# Patient Record
Sex: FEMALE | Race: WHITE | Employment: OTHER | URBAN - METROPOLITAN AREA
[De-identification: names, ages, dates, MRNs, and addresses within clinical notes are randomized per-mention and may not be internally consistent; named-entity substitution may affect disease eponyms.]

---

## 2021-04-29 ENCOUNTER — HOSPITAL ENCOUNTER (OUTPATIENT)
Dept: LAB | Age: 71
Discharge: HOME | End: 2021-04-29
Payer: MEDICARE

## 2021-04-29 ENCOUNTER — TRANSCRIBE ORDER (OUTPATIENT)
Dept: REGISTRATION | Age: 71
End: 2021-04-29

## 2021-04-29 ENCOUNTER — HOSPITAL ENCOUNTER (OUTPATIENT)
Dept: NON INVASIVE DIAGNOSTICS | Age: 71
Discharge: HOME | End: 2021-04-29
Payer: MEDICARE

## 2021-04-29 DIAGNOSIS — M65.871 OTHER SYNOVITIS AND TENOSYNOVITIS, RIGHT ANKLE AND FOOT: ICD-10-CM

## 2021-04-29 DIAGNOSIS — M20.11 ACQUIRED HALLUX VALGUS OF RIGHT FOOT: Primary | ICD-10-CM

## 2021-04-29 DIAGNOSIS — Z01.812 BLOOD TESTS PRIOR TO TREATMENT OR PROCEDURE: ICD-10-CM

## 2021-04-29 DIAGNOSIS — M20.11 ACQUIRED HALLUX VALGUS OF RIGHT FOOT: ICD-10-CM

## 2021-04-29 DIAGNOSIS — M20.41 ACQUIRED HAMMER TOE OF RIGHT FOOT: ICD-10-CM

## 2021-04-29 LAB
ANION GAP SERPL CALC-SCNC: 4 MMOL/L (ref 3–18)
ATRIAL RATE: 71 BPM
BUN SERPL-MCNC: 18 MG/DL (ref 7–18)
BUN/CREAT SERPL: 17 (ref 12–20)
CALCIUM SERPL-MCNC: 8.9 MG/DL (ref 8.5–10.1)
CALCULATED P AXIS, ECG09: 83 DEGREES
CALCULATED R AXIS, ECG10: 47 DEGREES
CALCULATED T AXIS, ECG11: 78 DEGREES
CHLORIDE SERPL-SCNC: 97 MMOL/L (ref 100–111)
CO2 SERPL-SCNC: 33 MMOL/L (ref 21–32)
CREAT SERPL-MCNC: 1.08 MG/DL (ref 0.6–1.3)
DIAGNOSIS, 93000: NORMAL
GLUCOSE SERPL-MCNC: 86 MG/DL (ref 74–99)
HCT VFR BLD AUTO: 36.9 % (ref 35–45)
HGB BLD-MCNC: 12.3 G/DL (ref 12–16)
P-R INTERVAL, ECG05: 236 MS
POTASSIUM SERPL-SCNC: 4 MMOL/L (ref 3.5–5.5)
Q-T INTERVAL, ECG07: 420 MS
QRS DURATION, ECG06: 78 MS
QTC CALCULATION (BEZET), ECG08: 456 MS
SODIUM SERPL-SCNC: 134 MMOL/L (ref 136–145)
VENTRICULAR RATE, ECG03: 71 BPM

## 2021-04-29 PROCEDURE — 85018 HEMOGLOBIN: CPT

## 2021-04-29 PROCEDURE — 36415 COLL VENOUS BLD VENIPUNCTURE: CPT

## 2021-04-29 PROCEDURE — 80048 BASIC METABOLIC PNL TOTAL CA: CPT

## 2021-04-29 PROCEDURE — 93005 ELECTROCARDIOGRAM TRACING: CPT

## 2022-03-29 ENCOUNTER — TRANSCRIBE ORDER (OUTPATIENT)
Dept: SCHEDULING | Age: 72
End: 2022-03-29

## 2022-03-29 DIAGNOSIS — M25.552 LEFT HIP PAIN: Primary | ICD-10-CM

## 2022-03-29 DIAGNOSIS — M16.12 OSTEOARTHRITIS OF LEFT HIP: ICD-10-CM

## 2022-04-13 ENCOUNTER — HOSPITAL ENCOUNTER (OUTPATIENT)
Dept: MRI IMAGING | Age: 72
Discharge: HOME | End: 2022-04-13
Attending: ORTHOPAEDIC SURGERY
Payer: MEDICARE

## 2022-04-13 DIAGNOSIS — M16.12 OSTEOARTHRITIS OF LEFT HIP: ICD-10-CM

## 2022-04-13 DIAGNOSIS — M25.552 LEFT HIP PAIN: ICD-10-CM

## 2022-04-13 PROCEDURE — 72195 MRI PELVIS W/O DYE: CPT

## 2022-05-09 ENCOUNTER — TRANSCRIBE ORDER (OUTPATIENT)
Dept: REGISTRATION | Age: 72
End: 2022-05-09

## 2022-05-09 ENCOUNTER — HOSPITAL ENCOUNTER (OUTPATIENT)
Dept: PREADMISSION TESTING | Age: 72
Discharge: HOME | End: 2022-05-09
Payer: MEDICARE

## 2022-05-09 DIAGNOSIS — Z01.812 BLOOD TESTS PRIOR TO TREATMENT OR PROCEDURE: ICD-10-CM

## 2022-05-09 DIAGNOSIS — M16.12 PRIMARY OSTEOARTHRITIS OF LEFT HIP: Primary | ICD-10-CM

## 2022-05-09 DIAGNOSIS — M16.12 PRIMARY OSTEOARTHRITIS OF LEFT HIP: ICD-10-CM

## 2022-05-09 LAB
ALBUMIN SERPL-MCNC: 3.7 G/DL (ref 3.4–5)
ALBUMIN/GLOB SERPL: 1.1 {RATIO} (ref 0.8–1.7)
ALP SERPL-CCNC: 179 U/L (ref 45–117)
ALT SERPL-CCNC: 27 U/L (ref 13–56)
ANION GAP SERPL CALC-SCNC: 4 MMOL/L (ref 3–18)
APPEARANCE UR: CLEAR
APTT PPP: 26.2 SEC (ref 23–36.4)
AST SERPL-CCNC: 19 U/L (ref 10–38)
BACTERIA URNS QL MICRO: ABNORMAL /HPF
BASOPHILS # BLD: 0 K/UL (ref 0–0.1)
BASOPHILS NFR BLD: 0 % (ref 0–2)
BILIRUB SERPL-MCNC: 0.3 MG/DL (ref 0.2–1)
BILIRUB UR QL: NEGATIVE
BUN SERPL-MCNC: 23 MG/DL (ref 7–18)
BUN/CREAT SERPL: 19 (ref 12–20)
CALCIUM SERPL-MCNC: 9.2 MG/DL (ref 8.5–10.1)
CHLORIDE SERPL-SCNC: 100 MMOL/L (ref 100–111)
CO2 SERPL-SCNC: 32 MMOL/L (ref 21–32)
COLOR UR: YELLOW
CREAT SERPL-MCNC: 1.18 MG/DL (ref 0.6–1.3)
DIFFERENTIAL METHOD BLD: ABNORMAL
EOSINOPHIL # BLD: 0.5 K/UL (ref 0–0.4)
EOSINOPHIL NFR BLD: 7 % (ref 0–5)
EPITH CASTS URNS QL MICRO: ABNORMAL /LPF (ref 0–5)
ERYTHROCYTE [DISTWIDTH] IN BLOOD BY AUTOMATED COUNT: 13.2 % (ref 11.6–14.5)
GLOBULIN SER CALC-MCNC: 3.4 G/DL (ref 2–4)
GLUCOSE SERPL-MCNC: 106 MG/DL (ref 74–99)
GLUCOSE UR STRIP.AUTO-MCNC: NEGATIVE MG/DL
GRAN CASTS URNS QL MICRO: ABNORMAL /LPF
HBA1C MFR BLD: 5.8 % (ref 4.2–5.6)
HCT VFR BLD AUTO: 39 % (ref 35–45)
HGB BLD-MCNC: 12.5 G/DL (ref 12–16)
HGB UR QL STRIP: NEGATIVE
HYALINE CASTS URNS QL MICRO: ABNORMAL /LPF (ref 0–2)
IMM GRANULOCYTES # BLD AUTO: 0 K/UL (ref 0–0.04)
IMM GRANULOCYTES NFR BLD AUTO: 0 % (ref 0–0.5)
INR PPP: 0.9 (ref 0.8–1.2)
KETONES UR QL STRIP.AUTO: ABNORMAL MG/DL
LEUKOCYTE ESTERASE UR QL STRIP.AUTO: ABNORMAL
LYMPHOCYTES # BLD: 1.6 K/UL (ref 0.9–3.6)
LYMPHOCYTES NFR BLD: 23 % (ref 21–52)
MCH RBC QN AUTO: 31.1 PG (ref 24–34)
MCHC RBC AUTO-ENTMCNC: 32.1 G/DL (ref 31–37)
MCV RBC AUTO: 97 FL (ref 78–100)
MONOCYTES # BLD: 0.5 K/UL (ref 0.05–1.2)
MONOCYTES NFR BLD: 7 % (ref 3–10)
NEUTS SEG # BLD: 4.4 K/UL (ref 1.8–8)
NEUTS SEG NFR BLD: 63 % (ref 40–73)
NITRITE UR QL STRIP.AUTO: NEGATIVE
NRBC # BLD: 0 K/UL (ref 0–0.01)
NRBC BLD-RTO: 0 PER 100 WBC
PH UR STRIP: 6.5 [PH] (ref 5–8)
PLATELET # BLD AUTO: 272 K/UL (ref 135–420)
PMV BLD AUTO: 9.2 FL (ref 9.2–11.8)
POTASSIUM SERPL-SCNC: 4.1 MMOL/L (ref 3.5–5.5)
PROT SERPL-MCNC: 7.1 G/DL (ref 6.4–8.2)
PROT UR STRIP-MCNC: NEGATIVE MG/DL
PROTHROMBIN TIME: 12.1 SEC (ref 11.5–15.2)
RBC # BLD AUTO: 4.02 M/UL (ref 4.2–5.3)
RBC #/AREA URNS HPF: NEGATIVE /HPF (ref 0–5)
SODIUM SERPL-SCNC: 136 MMOL/L (ref 136–145)
SP GR UR REFRACTOMETRY: 1.01 (ref 1–1.03)
UROBILINOGEN UR QL STRIP.AUTO: 1 EU/DL (ref 0.2–1)
WBC # BLD AUTO: 7.1 K/UL (ref 4.6–13.2)
WBC URNS QL MICRO: ABNORMAL /HPF (ref 0–5)
YEAST URNS QL MICRO: ABNORMAL

## 2022-05-09 PROCEDURE — 80053 COMPREHEN METABOLIC PANEL: CPT

## 2022-05-09 PROCEDURE — 36415 COLL VENOUS BLD VENIPUNCTURE: CPT

## 2022-05-09 PROCEDURE — 85025 COMPLETE CBC W/AUTO DIFF WBC: CPT

## 2022-05-09 PROCEDURE — 87086 URINE CULTURE/COLONY COUNT: CPT

## 2022-05-09 PROCEDURE — 85730 THROMBOPLASTIN TIME PARTIAL: CPT

## 2022-05-09 PROCEDURE — 81001 URINALYSIS AUTO W/SCOPE: CPT

## 2022-05-09 PROCEDURE — 93005 ELECTROCARDIOGRAM TRACING: CPT

## 2022-05-09 PROCEDURE — 83036 HEMOGLOBIN GLYCOSYLATED A1C: CPT

## 2022-05-09 PROCEDURE — 85610 PROTHROMBIN TIME: CPT

## 2022-05-10 LAB
ATRIAL RATE: 82 BPM
BACTERIA SPEC CULT: NORMAL
CALCULATED P AXIS, ECG09: 58 DEGREES
CALCULATED R AXIS, ECG10: 0 DEGREES
CALCULATED T AXIS, ECG11: 40 DEGREES
CC UR VC: NORMAL
DIAGNOSIS, 93000: NORMAL
P-R INTERVAL, ECG05: 234 MS
Q-T INTERVAL, ECG07: 372 MS
QRS DURATION, ECG06: 72 MS
QTC CALCULATION (BEZET), ECG08: 434 MS
SERVICE CMNT-IMP: NORMAL
SERVICE CMNT-IMP: NORMAL
VENTRICULAR RATE, ECG03: 82 BPM

## 2022-05-16 ENCOUNTER — HOSPITAL ENCOUNTER (OUTPATIENT)
Dept: PREADMISSION TESTING | Age: 72
Discharge: HOME | End: 2022-05-16

## 2022-05-16 VITALS — BODY MASS INDEX: 28.06 KG/M2 | HEIGHT: 70 IN | WEIGHT: 196 LBS

## 2022-05-16 RX ORDER — DICLOFENAC SODIUM 75 MG/1
75 TABLET, DELAYED RELEASE ORAL 2 TIMES DAILY
COMMUNITY
End: 2022-05-24

## 2022-05-16 RX ORDER — CEFAZOLIN SODIUM/WATER 2 G/20 ML
2 SYRINGE (ML) INTRAVENOUS ONCE
Status: CANCELLED | OUTPATIENT
Start: 2022-05-16 | End: 2022-05-16

## 2022-05-16 RX ORDER — CLONAZEPAM 1 MG/1
1 TABLET ORAL 2 TIMES DAILY
COMMUNITY

## 2022-05-16 RX ORDER — TRIAMTERENE AND HYDROCHLOROTHIAZIDE 37.5; 25 MG/1; MG/1
1 CAPSULE ORAL DAILY
COMMUNITY

## 2022-05-16 RX ORDER — FLUVOXAMINE MALEATE 50 MG/1
50 TABLET ORAL
COMMUNITY

## 2022-05-16 RX ORDER — SODIUM CHLORIDE, SODIUM LACTATE, POTASSIUM CHLORIDE, CALCIUM CHLORIDE 600; 310; 30; 20 MG/100ML; MG/100ML; MG/100ML; MG/100ML
125 INJECTION, SOLUTION INTRAVENOUS CONTINUOUS
Status: CANCELLED | OUTPATIENT
Start: 2022-05-16

## 2022-05-16 NOTE — NURSE NAVIGATOR
Kathy Wray watched the pre op seminar online and received a preoperative education booklet in anticipation of surgery    Nurse Navigator

## 2022-05-16 NOTE — PERIOP NOTES
PAT - SURGICAL PRE-ADMISSION INSTRUCTIONS    NAME:  Lise Lau                                                          TODAY'S DATE:  5/16/2022    SURGERY DATE:  5/23/2022                                  SURGERY ARRIVAL TIME:   TBA    1. Do NOT eat or drink anything, including candy or gum, after MIDNIGHT on 5/23/2022 , unless you have specific instructions from your Surgeon or Anesthesia Provider to do so. 2. No smoking 24 hours before surgery. 3. No alcohol 24 hours prior to the day of surgery. 4. No recreational drugs for one week prior to the day of surgery. 5. Leave all valuables, including money/purse, at home. 6. Remove all jewelry, nail polish, makeup (including mascara); no lotions, powders, deodorant, or perfume/cologne/after shave. 7. Glasses/Contact lenses and Dentures may be worn to the hospital.  They will be removed prior to surgery. 8. Call your doctor if symptoms of a cold or illness develop within 24 ours prior to surgery. 9. AN ADULT MUST DRIVE YOU HOME AFTER OUTPATIENT SURGERY. 10. If you are having an OUTPATIENT procedure, please make arrangements for a responsible adult to be with you for 24 hours after your surgery. 11. If you are admitted to the hospital, you will be assigned to a bed after surgery is complete. Normally a family member will not be able to see you until you are in your assigned bed. 12. Visitation Restrictions Explained. Pt denies an advanced directive. Pt denies DNR. Pt states she has urinary retention with the last procedure and require urinary indwelling catheter for two days. She would like for the team to be aware. Special Instructions:  Covid Test not required, pt vaccinated. Pt instructed to call surgeon's office to clarify diclofenac (voltaran) med for further instructions. Patient Prep:    use CHG solution, pt received. Instructions review. These surgical instructions were reviewed with patient  during the PAT phone interview.

## 2022-05-19 NOTE — H&P
[address] [facility] [address]  History and Physical Exam    Patient: [facility] MRN: [id number]  SSN: [id number]    YOB: 1950  Age: 67 y.o. Sex: female      Subjective:      Chief Complaint: left hip pain    History of Present Illness:  Patient complains of left hip pain and difficulty ambulating. Past Medical History:   Diagnosis Date    Arthritis     osteo    Difficult intubation     esophagus needs to be stretch every 2 years, had done 03/2022    GERD (gastroesophageal reflux disease)     relux, and hiatal hernia    Hypertension     Osteoarthritis of right knee 2/1/2013    Other ill-defined conditions(799.89)     hiatal hernia    Psychiatric disorder     depression    S/P total knee arthroplasty 02/04/2013     Past Surgical History:   Procedure Laterality Date    HX CATARACT REMOVAL      left eye    HX CHOLECYSTECTOMY      laparoscopic    HX COLONOSCOPY      had 2 done, f/u in 10 years    HX HEENT  04/2022    repair detached retena    HX HYSTERECTOMY      TVH    HX KNEE ARTHROSCOPY      right knee replacement    HX OOPHORECTOMY      BSO    HX ORTHOPAEDIC      manipulation of left shoulder under anesthesia     Social History     Occupational History    Not on file   Tobacco Use    Smoking status: Never Smoker    Smokeless tobacco: Never Used   Substance and Sexual Activity    Alcohol use: No    Drug use: Never    Sexual activity: Not on file     Prior to Admission medications    Medication Sig Start Date End Date Taking? Authorizing Provider   flavoxate HCl (FLAVOXATE PO) Take  by mouth. Provider, Historical   fluvoxaMINE (LUVOX) 50 mg tablet Take 50 mg by mouth nightly. Indications: depression    Provider, Historical   linaCLOtide (Linzess) 145 mcg cap capsule Take 145 mg by mouth. Indications: IBS    Provider, Historical   clonazePAM (KlonoPIN) 1 mg tablet Take 1 mg by mouth two (2) times a day.     Provider, Historical triamterene-hydroCHLOROthiazide (DYAZIDE) 37.5-25 mg per capsule Take 1 Capsule by mouth daily. Provider, Historical   diclofenac EC (VOLTAREN) 75 mg EC tablet Take 75 mg by mouth two (2) times a day. Indications: arthritis    Provider, Historical   oxyCODONE-acetaminophen (PERCOCET) 5-325 mg per tablet Take 1-2 Tabs by mouth every four (4) hours as needed for Pain. 2/6/13   Arleen JOSEPH PA-C   oxyCODONE IR (ROXICODONE) 10 mg Tab immediate release tablet Take  by mouth four (4) times daily as needed. Provider, Historical   Dexlansoprazole (DEXILANT) 60 mg CpDM Take 1 Cap by mouth daily. Pt instructed to take with a small bit of water on DOS    Provider, Historical   escitalopram (LEXAPRO) 20 mg tablet Take 20 mg by mouth daily. Provider, Historical   ARIPiprazole (ABILIFY) 2 mg tablet Take 10 mg by mouth nightly. Indications: depression    Provider, Historical   amitriptyline (ELAVIL) 100 mg tablet Take 100 mg by mouth two (2) times a day. Indications: helps with sleep    Provider, Historical       Allergies: Allergies   Allergen Reactions    Neosporin [Benzalkonium Chloride] Other (comments)     Soreness and redness    Other Medication Shortness of Breath and Swelling     Yellow Jacket bee stings      No pertinent family history. Review of Systems:  A comprehensive review of systems was negative except for that written in the History of Present Illness. Objective:       Physical Exam:  HEENT: Normocephalic, atraumatic  Lungs:  Clear to auscultation  Heart:   Regular rate and rhythm  Abdomen: Soft  Extremities:  Pain with range of motion of the left hip  Neurological: Grossly neurovascularly intact    Assessment:      Arthritis of the left hip. Plan:       The patient has failed previous efforts of conservative management to include non-steroidal anti-inflammatory medications and cortisone injections.  Due to the fact that conservative efforts failed, the patient became a candidate for surgical intervention. Proceed with scheduled left total hip arthroplasty, anterior approach. The various methods of treatment have been discussed with the patient and family. After consideration of risks, benefits, and other options for treatment, the patient has consented to surgical interventions. Questions were answered and preoperative teaching was done by Dr. Compa Davila.      Signed By: Renny Willis PA-C     May 19, 2022

## 2022-05-20 ENCOUNTER — ANESTHESIA EVENT (OUTPATIENT)
Dept: SURGERY | Age: 72
DRG: 468 | End: 2022-05-20
Payer: MEDICARE

## 2022-05-23 ENCOUNTER — HOSPITAL ENCOUNTER (INPATIENT)
Age: 72
LOS: 1 days | Discharge: HOME HEALTH | DRG: 468 | End: 2022-05-24
Attending: ORTHOPAEDIC SURGERY | Admitting: ORTHOPAEDIC SURGERY
Payer: MEDICARE

## 2022-05-23 ENCOUNTER — ANESTHESIA (OUTPATIENT)
Dept: SURGERY | Age: 72
DRG: 468 | End: 2022-05-23
Payer: MEDICARE

## 2022-05-23 ENCOUNTER — APPOINTMENT (OUTPATIENT)
Dept: GENERAL RADIOLOGY | Age: 72
DRG: 468 | End: 2022-05-23
Attending: PHYSICIAN ASSISTANT
Payer: MEDICARE

## 2022-05-23 ENCOUNTER — APPOINTMENT (OUTPATIENT)
Dept: GENERAL RADIOLOGY | Age: 72
DRG: 468 | End: 2022-05-23
Attending: ORTHOPAEDIC SURGERY
Payer: MEDICARE

## 2022-05-23 DIAGNOSIS — Z96.642 S/P HIP REPLACEMENT, LEFT: Primary | ICD-10-CM

## 2022-05-23 LAB
ABO + RH BLD: NORMAL
BLOOD GROUP ANTIBODIES SERPL: NORMAL
GLUCOSE BLD STRIP.AUTO-MCNC: 109 MG/DL (ref 70–110)
SPECIMEN EXP DATE BLD: NORMAL

## 2022-05-23 PROCEDURE — 77030013708 HC HNDPC SUC IRR PULS STRY –B: Performed by: ORTHOPAEDIC SURGERY

## 2022-05-23 PROCEDURE — 74011250636 HC RX REV CODE- 250/636: Performed by: ORTHOPAEDIC SURGERY

## 2022-05-23 PROCEDURE — 77030018673: Performed by: ORTHOPAEDIC SURGERY

## 2022-05-23 PROCEDURE — 36415 COLL VENOUS BLD VENIPUNCTURE: CPT

## 2022-05-23 PROCEDURE — C1776 JOINT DEVICE (IMPLANTABLE): HCPCS | Performed by: ORTHOPAEDIC SURGERY

## 2022-05-23 PROCEDURE — 76210000023 HC REC RM PH II 2 TO 2.5 HR: Performed by: ORTHOPAEDIC SURGERY

## 2022-05-23 PROCEDURE — 74011250636 HC RX REV CODE- 250/636: Performed by: NURSE ANESTHETIST, CERTIFIED REGISTERED

## 2022-05-23 PROCEDURE — 80048 BASIC METABOLIC PNL TOTAL CA: CPT

## 2022-05-23 PROCEDURE — 97166 OT EVAL MOD COMPLEX 45 MIN: CPT

## 2022-05-23 PROCEDURE — 77030012712 HC WND ARTHRO ABLT S&N -E: Performed by: ORTHOPAEDIC SURGERY

## 2022-05-23 PROCEDURE — 77030006643: Performed by: STUDENT IN AN ORGANIZED HEALTH CARE EDUCATION/TRAINING PROGRAM

## 2022-05-23 PROCEDURE — 74011000250 HC RX REV CODE- 250: Performed by: PHYSICIAN ASSISTANT

## 2022-05-23 PROCEDURE — 77030034479 HC ADH SKN CLSR PRINEO J&J -B: Performed by: ORTHOPAEDIC SURGERY

## 2022-05-23 PROCEDURE — 77030031139 HC SUT VCRL2 J&J -A: Performed by: ORTHOPAEDIC SURGERY

## 2022-05-23 PROCEDURE — 85014 HEMATOCRIT: CPT

## 2022-05-23 PROCEDURE — 77030035643 HC BLD SAW OSC PRECIS STRY -C: Performed by: ORTHOPAEDIC SURGERY

## 2022-05-23 PROCEDURE — 77030008477 HC STYL SATN SLP COVD -A: Performed by: STUDENT IN AN ORGANIZED HEALTH CARE EDUCATION/TRAINING PROGRAM

## 2022-05-23 PROCEDURE — 76060000036 HC ANESTHESIA 2.5 TO 3 HR: Performed by: ORTHOPAEDIC SURGERY

## 2022-05-23 PROCEDURE — 65270000029 HC RM PRIVATE

## 2022-05-23 PROCEDURE — 77030002933 HC SUT MCRYL J&J -A: Performed by: ORTHOPAEDIC SURGERY

## 2022-05-23 PROCEDURE — 51798 US URINE CAPACITY MEASURE: CPT

## 2022-05-23 PROCEDURE — 76010000132 HC OR TIME 2.5 TO 3 HR: Performed by: ORTHOPAEDIC SURGERY

## 2022-05-23 PROCEDURE — G0378 HOSPITAL OBSERVATION PER HR: HCPCS

## 2022-05-23 PROCEDURE — 97530 THERAPEUTIC ACTIVITIES: CPT

## 2022-05-23 PROCEDURE — 77030018846 HC SOL IRR STRL H20 ICUM -A: Performed by: ORTHOPAEDIC SURGERY

## 2022-05-23 PROCEDURE — 77030018835 HC SOL IRR LR ICUM -A: Performed by: ORTHOPAEDIC SURGERY

## 2022-05-23 PROCEDURE — 77030008683 HC TU ET CUF COVD -A: Performed by: STUDENT IN AN ORGANIZED HEALTH CARE EDUCATION/TRAINING PROGRAM

## 2022-05-23 PROCEDURE — 77030040361 HC SLV COMPR DVT MDII -B: Performed by: ORTHOPAEDIC SURGERY

## 2022-05-23 PROCEDURE — 82962 GLUCOSE BLOOD TEST: CPT

## 2022-05-23 PROCEDURE — 76210000006 HC OR PH I REC 0.5 TO 1 HR: Performed by: ORTHOPAEDIC SURGERY

## 2022-05-23 PROCEDURE — 74011250636 HC RX REV CODE- 250/636: Performed by: STUDENT IN AN ORGANIZED HEALTH CARE EDUCATION/TRAINING PROGRAM

## 2022-05-23 PROCEDURE — 74011000250 HC RX REV CODE- 250: Performed by: NURSE ANESTHETIST, CERTIFIED REGISTERED

## 2022-05-23 PROCEDURE — 74011250636 HC RX REV CODE- 250/636: Performed by: PHYSICIAN ASSISTANT

## 2022-05-23 PROCEDURE — 2709999900 HC NON-CHARGEABLE SUPPLY: Performed by: ORTHOPAEDIC SURGERY

## 2022-05-23 PROCEDURE — 97162 PT EVAL MOD COMPLEX 30 MIN: CPT

## 2022-05-23 PROCEDURE — 77030027138 HC INCENT SPIROMETER -A: Performed by: ORTHOPAEDIC SURGERY

## 2022-05-23 PROCEDURE — 73501 X-RAY EXAM HIP UNI 1 VIEW: CPT

## 2022-05-23 PROCEDURE — 77030020782 HC GWN BAIR PAWS FLX 3M -B: Performed by: ORTHOPAEDIC SURGERY

## 2022-05-23 PROCEDURE — 74011000250 HC RX REV CODE- 250: Performed by: ORTHOPAEDIC SURGERY

## 2022-05-23 PROCEDURE — 77030003666 HC NDL SPINAL BD -A: Performed by: ORTHOPAEDIC SURGERY

## 2022-05-23 PROCEDURE — 74011250637 HC RX REV CODE- 250/637: Performed by: PHYSICIAN ASSISTANT

## 2022-05-23 PROCEDURE — 77030026044 HC TIP IRR PULS STRY -A: Performed by: ORTHOPAEDIC SURGERY

## 2022-05-23 PROCEDURE — 86900 BLOOD TYPING SEROLOGIC ABO: CPT

## 2022-05-23 DEVICE — LFIT V40 FEMORAL HEAD
Type: IMPLANTABLE DEVICE | Site: HIP | Status: FUNCTIONAL
Brand: V40 HEAD

## 2022-05-23 DEVICE — HIP STEM - HIGH OFFSET
Type: IMPLANTABLE DEVICE | Site: HIP | Status: FUNCTIONAL
Brand: INSIGNIA

## 2022-05-23 DEVICE — X3 INSERT FOR ADM/ MDM
Type: IMPLANTABLE DEVICE | Site: HIP | Status: FUNCTIONAL
Brand: X3

## 2022-05-23 DEVICE — TRIDENT II CLUSTERHOLE HA ACETABULAR SHELL 54E
Type: IMPLANTABLE DEVICE | Site: HIP | Status: FUNCTIONAL
Brand: TRIDENT II

## 2022-05-23 DEVICE — IMPLANTABLE DEVICE: Type: IMPLANTABLE DEVICE | Site: HIP | Status: FUNCTIONAL

## 2022-05-23 DEVICE — SCR HEX 6.5X25MM -- TRIDENT II: Type: IMPLANTABLE DEVICE | Site: HIP | Status: FUNCTIONAL

## 2022-05-23 DEVICE — LINER- CEMENTLESS
Type: IMPLANTABLE DEVICE | Site: HIP | Status: FUNCTIONAL
Brand: MDM

## 2022-05-23 RX ORDER — MIDAZOLAM HYDROCHLORIDE 1 MG/ML
INJECTION, SOLUTION INTRAMUSCULAR; INTRAVENOUS AS NEEDED
Status: DISCONTINUED | OUTPATIENT
Start: 2022-05-23 | End: 2022-05-23 | Stop reason: HOSPADM

## 2022-05-23 RX ORDER — PROPOFOL 10 MG/ML
INJECTION, EMULSION INTRAVENOUS AS NEEDED
Status: DISCONTINUED | OUTPATIENT
Start: 2022-05-23 | End: 2022-05-23 | Stop reason: HOSPADM

## 2022-05-23 RX ORDER — DIPHENHYDRAMINE HYDROCHLORIDE 50 MG/ML
12.5 INJECTION, SOLUTION INTRAMUSCULAR; INTRAVENOUS
Status: DISCONTINUED | OUTPATIENT
Start: 2022-05-23 | End: 2022-05-24 | Stop reason: HOSPADM

## 2022-05-23 RX ORDER — TRIAMTERENE/HYDROCHLOROTHIAZID 37.5-25 MG
1 TABLET ORAL DAILY
Status: DISCONTINUED | OUTPATIENT
Start: 2022-05-24 | End: 2022-05-24 | Stop reason: HOSPADM

## 2022-05-23 RX ORDER — ONDANSETRON 2 MG/ML
INJECTION INTRAMUSCULAR; INTRAVENOUS AS NEEDED
Status: DISCONTINUED | OUTPATIENT
Start: 2022-05-23 | End: 2022-05-23 | Stop reason: HOSPADM

## 2022-05-23 RX ORDER — CEPHALEXIN 500 MG/1
500 CAPSULE ORAL 4 TIMES DAILY
Qty: 8 CAPSULE | Refills: 0 | Status: SHIPPED | OUTPATIENT
Start: 2022-05-23 | End: 2022-05-25

## 2022-05-23 RX ORDER — ALBUTEROL SULFATE 0.83 MG/ML
2.5 SOLUTION RESPIRATORY (INHALATION)
Status: DISCONTINUED | OUTPATIENT
Start: 2022-05-23 | End: 2022-05-24 | Stop reason: HOSPADM

## 2022-05-23 RX ORDER — DIPHENHYDRAMINE HCL 25 MG
25 CAPSULE ORAL
Status: DISCONTINUED | OUTPATIENT
Start: 2022-05-23 | End: 2022-05-24 | Stop reason: HOSPADM

## 2022-05-23 RX ORDER — FLUVOXAMINE MALEATE 50 MG/1
50 TABLET ORAL
Status: DISCONTINUED | OUTPATIENT
Start: 2022-05-23 | End: 2022-05-24 | Stop reason: HOSPADM

## 2022-05-23 RX ORDER — SODIUM CHLORIDE 0.9 % (FLUSH) 0.9 %
5-40 SYRINGE (ML) INJECTION EVERY 8 HOURS
Status: DISCONTINUED | OUTPATIENT
Start: 2022-05-23 | End: 2022-05-24 | Stop reason: HOSPADM

## 2022-05-23 RX ORDER — EPHEDRINE SULFATE/0.9% NACL/PF 50 MG/5 ML
SYRINGE (ML) INTRAVENOUS AS NEEDED
Status: DISCONTINUED | OUTPATIENT
Start: 2022-05-23 | End: 2022-05-23 | Stop reason: HOSPADM

## 2022-05-23 RX ORDER — PANTOPRAZOLE SODIUM 40 MG/1
40 TABLET, DELAYED RELEASE ORAL
Status: DISCONTINUED | OUTPATIENT
Start: 2022-05-24 | End: 2022-05-24 | Stop reason: HOSPADM

## 2022-05-23 RX ORDER — CEFAZOLIN SODIUM/WATER 2 G/20 ML
2 SYRINGE (ML) INTRAVENOUS EVERY 8 HOURS
Status: COMPLETED | OUTPATIENT
Start: 2022-05-23 | End: 2022-05-24

## 2022-05-23 RX ORDER — LIDOCAINE HYDROCHLORIDE 20 MG/ML
INJECTION, SOLUTION EPIDURAL; INFILTRATION; INTRACAUDAL; PERINEURAL AS NEEDED
Status: DISCONTINUED | OUTPATIENT
Start: 2022-05-23 | End: 2022-05-23 | Stop reason: HOSPADM

## 2022-05-23 RX ORDER — NALOXONE HYDROCHLORIDE 0.4 MG/ML
0.04 INJECTION, SOLUTION INTRAMUSCULAR; INTRAVENOUS; SUBCUTANEOUS
Status: DISCONTINUED | OUTPATIENT
Start: 2022-05-23 | End: 2022-05-24 | Stop reason: HOSPADM

## 2022-05-23 RX ORDER — DEXAMETHASONE SODIUM PHOSPHATE 4 MG/ML
INJECTION, SOLUTION INTRA-ARTICULAR; INTRALESIONAL; INTRAMUSCULAR; INTRAVENOUS; SOFT TISSUE AS NEEDED
Status: DISCONTINUED | OUTPATIENT
Start: 2022-05-23 | End: 2022-05-23 | Stop reason: HOSPADM

## 2022-05-23 RX ORDER — SODIUM CHLORIDE, SODIUM LACTATE, POTASSIUM CHLORIDE, CALCIUM CHLORIDE 600; 310; 30; 20 MG/100ML; MG/100ML; MG/100ML; MG/100ML
125 INJECTION, SOLUTION INTRAVENOUS CONTINUOUS
Status: DISCONTINUED | OUTPATIENT
Start: 2022-05-23 | End: 2022-05-24 | Stop reason: HOSPADM

## 2022-05-23 RX ORDER — HYDROMORPHONE HYDROCHLORIDE 1 MG/ML
0.5 INJECTION, SOLUTION INTRAMUSCULAR; INTRAVENOUS; SUBCUTANEOUS AS NEEDED
Status: DISCONTINUED | OUTPATIENT
Start: 2022-05-23 | End: 2022-05-24 | Stop reason: HOSPADM

## 2022-05-23 RX ORDER — ESCITALOPRAM OXALATE 10 MG/1
20 TABLET ORAL DAILY
Status: DISCONTINUED | OUTPATIENT
Start: 2022-05-24 | End: 2022-05-24 | Stop reason: HOSPADM

## 2022-05-23 RX ORDER — ONDANSETRON 2 MG/ML
4 INJECTION INTRAMUSCULAR; INTRAVENOUS ONCE
Status: ACTIVE | OUTPATIENT
Start: 2022-05-23 | End: 2022-05-24

## 2022-05-23 RX ORDER — PHENYLEPHRINE HCL IN 0.9% NACL 1 MG/10 ML
SYRINGE (ML) INTRAVENOUS AS NEEDED
Status: DISCONTINUED | OUTPATIENT
Start: 2022-05-23 | End: 2022-05-23 | Stop reason: HOSPADM

## 2022-05-23 RX ORDER — CEFAZOLIN SODIUM/WATER 2 G/20 ML
2 SYRINGE (ML) INTRAVENOUS ONCE
Status: COMPLETED | OUTPATIENT
Start: 2022-05-23 | End: 2022-05-23

## 2022-05-23 RX ORDER — ASPIRIN 81 MG/1
81 TABLET ORAL 2 TIMES DAILY
Qty: 60 TABLET | Refills: 0 | Status: SHIPPED | OUTPATIENT
Start: 2022-05-23

## 2022-05-23 RX ORDER — OXYCODONE AND ACETAMINOPHEN 5; 325 MG/1; MG/1
1-2 TABLET ORAL
Status: DISCONTINUED | OUTPATIENT
Start: 2022-05-23 | End: 2022-05-24 | Stop reason: HOSPADM

## 2022-05-23 RX ORDER — SODIUM CHLORIDE, SODIUM LACTATE, POTASSIUM CHLORIDE, CALCIUM CHLORIDE 600; 310; 30; 20 MG/100ML; MG/100ML; MG/100ML; MG/100ML
50 INJECTION, SOLUTION INTRAVENOUS CONTINUOUS
Status: DISCONTINUED | OUTPATIENT
Start: 2022-05-23 | End: 2022-05-24 | Stop reason: HOSPADM

## 2022-05-23 RX ORDER — FENTANYL CITRATE 50 UG/ML
50 INJECTION, SOLUTION INTRAMUSCULAR; INTRAVENOUS
Status: DISCONTINUED | OUTPATIENT
Start: 2022-05-23 | End: 2022-05-24 | Stop reason: HOSPADM

## 2022-05-23 RX ORDER — HYDROCODONE BITARTRATE AND ACETAMINOPHEN 5; 325 MG/1; MG/1
1 TABLET ORAL
Status: COMPLETED | OUTPATIENT
Start: 2022-05-23 | End: 2022-05-24

## 2022-05-23 RX ORDER — ASPIRIN 81 MG/1
81 TABLET ORAL 2 TIMES DAILY
Status: DISCONTINUED | OUTPATIENT
Start: 2022-05-23 | End: 2022-05-24 | Stop reason: HOSPADM

## 2022-05-23 RX ORDER — ARIPIPRAZOLE 10 MG/1
10 TABLET ORAL
Status: DISCONTINUED | OUTPATIENT
Start: 2022-05-23 | End: 2022-05-24 | Stop reason: HOSPADM

## 2022-05-23 RX ORDER — HYDROMORPHONE HYDROCHLORIDE 1 MG/ML
0.5 INJECTION, SOLUTION INTRAMUSCULAR; INTRAVENOUS; SUBCUTANEOUS
Status: DISCONTINUED | OUTPATIENT
Start: 2022-05-23 | End: 2022-05-24 | Stop reason: HOSPADM

## 2022-05-23 RX ORDER — ONDANSETRON 2 MG/ML
4 INJECTION INTRAMUSCULAR; INTRAVENOUS
Status: DISCONTINUED | OUTPATIENT
Start: 2022-05-23 | End: 2022-05-24 | Stop reason: HOSPADM

## 2022-05-23 RX ORDER — NALBUPHINE HYDROCHLORIDE 10 MG/ML
5 INJECTION, SOLUTION INTRAMUSCULAR; INTRAVENOUS; SUBCUTANEOUS
Status: DISCONTINUED | OUTPATIENT
Start: 2022-05-23 | End: 2022-05-24 | Stop reason: HOSPADM

## 2022-05-23 RX ORDER — SODIUM CHLORIDE 0.9 % (FLUSH) 0.9 %
5-40 SYRINGE (ML) INJECTION AS NEEDED
Status: DISCONTINUED | OUTPATIENT
Start: 2022-05-23 | End: 2022-05-24 | Stop reason: HOSPADM

## 2022-05-23 RX ORDER — NALOXONE HYDROCHLORIDE 0.4 MG/ML
0.4 INJECTION, SOLUTION INTRAMUSCULAR; INTRAVENOUS; SUBCUTANEOUS AS NEEDED
Status: DISCONTINUED | OUTPATIENT
Start: 2022-05-23 | End: 2022-05-24 | Stop reason: HOSPADM

## 2022-05-23 RX ORDER — ROCURONIUM BROMIDE 10 MG/ML
INJECTION, SOLUTION INTRAVENOUS AS NEEDED
Status: DISCONTINUED | OUTPATIENT
Start: 2022-05-23 | End: 2022-05-23 | Stop reason: HOSPADM

## 2022-05-23 RX ORDER — SODIUM CHLORIDE, SODIUM LACTATE, POTASSIUM CHLORIDE, CALCIUM CHLORIDE 600; 310; 30; 20 MG/100ML; MG/100ML; MG/100ML; MG/100ML
75 INJECTION, SOLUTION INTRAVENOUS CONTINUOUS
Status: DISCONTINUED | OUTPATIENT
Start: 2022-05-23 | End: 2022-05-24 | Stop reason: HOSPADM

## 2022-05-23 RX ORDER — CLONAZEPAM 0.5 MG/1
1 TABLET ORAL 2 TIMES DAILY
Status: DISCONTINUED | OUTPATIENT
Start: 2022-05-23 | End: 2022-05-24 | Stop reason: HOSPADM

## 2022-05-23 RX ORDER — FLAVOXATE HYDROCHLORIDE 100 MG/1
100 TABLET ORAL DAILY
Status: DISCONTINUED | OUTPATIENT
Start: 2022-05-24 | End: 2022-05-24 | Stop reason: HOSPADM

## 2022-05-23 RX ORDER — GLYCOPYRROLATE 0.2 MG/ML
INJECTION INTRAMUSCULAR; INTRAVENOUS AS NEEDED
Status: DISCONTINUED | OUTPATIENT
Start: 2022-05-23 | End: 2022-05-23 | Stop reason: HOSPADM

## 2022-05-23 RX ORDER — AMITRIPTYLINE HYDROCHLORIDE 50 MG/1
100 TABLET, FILM COATED ORAL 2 TIMES DAILY
Status: DISCONTINUED | OUTPATIENT
Start: 2022-05-23 | End: 2022-05-24 | Stop reason: HOSPADM

## 2022-05-23 RX ORDER — KETAMINE HCL IN 0.9 % NACL 50 MG/5 ML
SYRINGE (ML) INTRAVENOUS AS NEEDED
Status: DISCONTINUED | OUTPATIENT
Start: 2022-05-23 | End: 2022-05-23 | Stop reason: HOSPADM

## 2022-05-23 RX ORDER — OXYCODONE AND ACETAMINOPHEN 5; 325 MG/1; MG/1
1-2 TABLET ORAL
Qty: 40 TABLET | Refills: 0 | Status: SHIPPED | OUTPATIENT
Start: 2022-05-23 | End: 2022-05-26

## 2022-05-23 RX ORDER — TRANEXAMIC ACID 10 MG/ML
1 INJECTION, SOLUTION INTRAVENOUS SEE ADMIN INSTRUCTIONS
Status: COMPLETED | OUTPATIENT
Start: 2022-05-23 | End: 2022-05-23

## 2022-05-23 RX ORDER — FENTANYL CITRATE 50 UG/ML
INJECTION, SOLUTION INTRAMUSCULAR; INTRAVENOUS AS NEEDED
Status: DISCONTINUED | OUTPATIENT
Start: 2022-05-23 | End: 2022-05-23 | Stop reason: HOSPADM

## 2022-05-23 RX ORDER — NALOXONE HYDROCHLORIDE 4 MG/.1ML
SPRAY NASAL
Qty: 1 EACH | Refills: 0 | Status: SHIPPED | OUTPATIENT
Start: 2022-05-23 | End: 2022-07-27

## 2022-05-23 RX ADMIN — Medication 2 G: at 14:34

## 2022-05-23 RX ADMIN — PROPOFOL 180 MG: 10 INJECTION, EMULSION INTRAVENOUS at 14:19

## 2022-05-23 RX ADMIN — Medication 50 MCG: at 15:14

## 2022-05-23 RX ADMIN — CEFAZOLIN 2 G: 10 INJECTION, POWDER, FOR SOLUTION INTRAVENOUS at 22:28

## 2022-05-23 RX ADMIN — GLYCOPYRROLATE 0.1 MG: 0.2 INJECTION INTRAMUSCULAR; INTRAVENOUS at 15:13

## 2022-05-23 RX ADMIN — LIDOCAINE HYDROCHLORIDE 60 MG: 20 INJECTION, SOLUTION INTRAVENOUS at 14:19

## 2022-05-23 RX ADMIN — CLONAZEPAM 1 MG: 0.5 TABLET ORAL at 22:27

## 2022-05-23 RX ADMIN — GLYCOPYRROLATE 0.1 MG: 0.2 INJECTION INTRAMUSCULAR; INTRAVENOUS at 15:11

## 2022-05-23 RX ADMIN — AMITRIPTYLINE HYDROCHLORIDE 100 MG: 50 TABLET, FILM COATED ORAL at 22:27

## 2022-05-23 RX ADMIN — FENTANYL CITRATE 100 MCG: 50 INJECTION, SOLUTION INTRAMUSCULAR; INTRAVENOUS at 14:19

## 2022-05-23 RX ADMIN — SODIUM CHLORIDE, SODIUM LACTATE, POTASSIUM CHLORIDE, AND CALCIUM CHLORIDE 125 ML/HR: 600; 310; 30; 20 INJECTION, SOLUTION INTRAVENOUS at 11:56

## 2022-05-23 RX ADMIN — SUGAMMADEX 200 MG: 100 INJECTION, SOLUTION INTRAVENOUS at 16:53

## 2022-05-23 RX ADMIN — ONDANSETRON HYDROCHLORIDE 4 MG: 2 INJECTION INTRAMUSCULAR; INTRAVENOUS at 15:24

## 2022-05-23 RX ADMIN — HYDROMORPHONE HYDROCHLORIDE 0.5 MG: 1 INJECTION, SOLUTION INTRAMUSCULAR; INTRAVENOUS; SUBCUTANEOUS at 18:09

## 2022-05-23 RX ADMIN — Medication 10 MG: at 15:04

## 2022-05-23 RX ADMIN — ASPIRIN 81 MG: 81 TABLET, COATED ORAL at 22:28

## 2022-05-23 RX ADMIN — Medication 20 MG: at 14:45

## 2022-05-23 RX ADMIN — TRANEXAMIC ACID 1 G: 10 INJECTION, SOLUTION INTRAVENOUS at 14:26

## 2022-05-23 RX ADMIN — ROCURONIUM BROMIDE 10 MG: 10 INJECTION, SOLUTION INTRAVENOUS at 15:18

## 2022-05-23 RX ADMIN — MIDAZOLAM 2 MG: 1 INJECTION INTRAMUSCULAR; INTRAVENOUS at 14:13

## 2022-05-23 RX ADMIN — TRANEXAMIC ACID 1 G: 10 INJECTION, SOLUTION INTRAVENOUS at 16:36

## 2022-05-23 RX ADMIN — SODIUM CHLORIDE, SODIUM LACTATE, POTASSIUM CHLORIDE, AND CALCIUM CHLORIDE 125 ML/HR: 600; 310; 30; 20 INJECTION, SOLUTION INTRAVENOUS at 11:09

## 2022-05-23 RX ADMIN — SODIUM CHLORIDE, SODIUM LACTATE, POTASSIUM CHLORIDE, AND CALCIUM CHLORIDE: 600; 310; 30; 20 INJECTION, SOLUTION INTRAVENOUS at 16:48

## 2022-05-23 RX ADMIN — ROCURONIUM BROMIDE 20 MG: 10 INJECTION, SOLUTION INTRAVENOUS at 15:54

## 2022-05-23 RX ADMIN — ROCURONIUM BROMIDE 20 MG: 10 INJECTION, SOLUTION INTRAVENOUS at 16:16

## 2022-05-23 RX ADMIN — Medication 12.5 MG: at 15:08

## 2022-05-23 RX ADMIN — ROCURONIUM BROMIDE 40 MG: 10 INJECTION, SOLUTION INTRAVENOUS at 14:19

## 2022-05-23 RX ADMIN — SODIUM CHLORIDE, SODIUM LACTATE, POTASSIUM CHLORIDE, AND CALCIUM CHLORIDE: 600; 310; 30; 20 INJECTION, SOLUTION INTRAVENOUS at 15:02

## 2022-05-23 RX ADMIN — DEXAMETHASONE SODIUM PHOSPHATE 4 MG: 4 INJECTION, SOLUTION INTRAMUSCULAR; INTRAVENOUS at 15:24

## 2022-05-23 RX ADMIN — SODIUM CHLORIDE, POTASSIUM CHLORIDE, SODIUM LACTATE AND CALCIUM CHLORIDE 75 ML/HR: 600; 310; 30; 20 INJECTION, SOLUTION INTRAVENOUS at 22:31

## 2022-05-23 RX ADMIN — HYDROMORPHONE HYDROCHLORIDE 0.5 MG: 1 INJECTION, SOLUTION INTRAMUSCULAR; INTRAVENOUS; SUBCUTANEOUS at 17:33

## 2022-05-23 RX ADMIN — Medication 10 MG: at 15:47

## 2022-05-23 NOTE — OP NOTES
[address] [address]  Total Left Hip Arthroplasty    Patient: Wally Cain MRN: [id number]  SSN: [id number]    YOB: 1950  Age: 67 y.o. Sex: female      Date of Surgery: 5/23/2022   Preoperative Diagnosis: LEFT HIP OSTEOARTHRITIS   Postoperative Diagnosis: LEFT HIP OSTEOARTHRITIS   Location: [facility]  Surgeon: Stephanie Rojas MD  Assistant:  Hernando DANIELLE    Anesthesia: General     Procedure: Total Left Hip Arthroplasty    Findings:  Degenerative joint disease of the left hip. Estimated Blood Loss: 700 cc    Specimens: None    Complications: None    Implants:   Implant Name Type Inv. Item Serial No.  Lot No. LRB No. Used Action   CLUSTERHOLE  ACETABULAR SHELL    [employer] CLUSTERHOLE  Left 1 Implanted   SCR HEX 6.5X25MM -- TRIDENT II - [id number]  SCR HEX 6.5X25MM -- TRIDENT II  [employer] [facility] Left 1 Implanted   LINER MDM COCR 42MM E --  - [id number]  LINER MDM COCR 42MM E --   [employer] [facility] [id number] Left 1 Implanted   INSIGNIA HIP STEM-HIGH OFFSET    [employer] [id number] Left 1 Implanted   HEAD FEM LFRIC V40 28X4MM COCR --  - [id number]  HEAD FEM LFRIC V40 28X4MM COCR --   [employer] [facility] [id number] Left 1 Implanted   INSERT ACET CUP X3 [id number] -- RESTORATION ADM - [id number]  INSERT ACET CUP X3 [id number] -- RESTORATION ADM  [employer] [facility] [id number] Left 1 Implanted       Procedure Detail:  After the patient was brought to the operating suite, She was effectively anesthetized using general anesthesia, then transferred to the Dana table and secured in a standard fashion. Her left hip was then prepped with Chloroprep and draped in a normal sterile orthopedic fashion. She was given appropriate intravenous antibiotic preoperatively.  After a proper timeout was performed, a direct anterior approach to the hip was performed using a short Smith-Peterseninterval. The incision was placed approximately 3 cm lateral to the anterior superior iliac spine and progressed distally and laterally for a length of approximately 4 inches. Incision was made through the Tensor Fascia Ann with the knife and continued with the Garcia scissors. An Allis clamp was placed on the medial border of the Tensor Fascia Ann and disection continued on the medial border of Tensor Fascia Ann Muscle. Dissection was continued down to the lateral hip capsule. A Cobra retractor was placed on the lateral hip capsule. A Scar Retractor was placed distally and a second Cobra retractor was placed on the medial hip capsule. The Lateral Femoral Circumflex Vessels were identified clamped and cauterized. Anterior capsulotomy was performed. Portions of the capsule were removed. The Cobra retractors were then placed on the femoral neck. The degenerative changes of the hip were noted. Femoral neck osteotomy was then performed. The head and neck were removed. The neck length was confirmed with the image intensification. The labrum was excised. The acetabulum was then reamed up to 54 mm with good bleeding bone in all quadrants obtained. The cup was then irrigated with pulse lavage system. A 54 mm Amador [place]ident Cluster hole cup was then impacted in place with excellent stable fixation obtained, placing the cup at about 45 degrees of abduction, 20 degrees of anteversion. one screw placed. The 42 mm inner diameter MDM liner was then impacted in place. Attention was turned to the femur, which was delivered into the wound with a combination of extension, external rotation, and adduction, and using the hook on the Coventry table to deliver the femur into the wound. The box osteotome was used followed by the canal finder and the lateralizing broach. The canal was broached up to a size 5. A trial reduction was then performed with the 0 neck offset 42 mm diameter trial. This was evaluated for leg length and canal fill. The broach was removed. The canal was irrigated with the pulse lavage system. The 5 size stem was then impacted into position with good fixation being obtained. The +4 x 28 head, 42 MDM liner was impacted into position after drying the de jesus taper with a sponge. The final reduction was performed and once again leg lengths and offset were restored radiographically, using the C-arm Excellent functional stability was noted. Final irrigation was done at this time. The wound was closed in layers. The tensor fascia wilfred was closed with #1 Vicryl in a running type stitch. Subcutaneous tissue was closed with 2-0 Vicryl in a simple buried stitch, and the skin was closed with a subcuticular 3-0 monocryl followed by the Prineo system. Mepilex dressing was then applied. She tolerated this well, was transferred to the recovery room bed, and taken to recovery room in stable condition. All sponge and needle counts were correct.      Signed By: Shreya Cobos MD     May 23, 2022

## 2022-05-23 NOTE — ANESTHESIA POSTPROCEDURE EVALUATION
Post-Anesthesia Evaluation and Assessment    Cardiovascular Function/Vital Signs  Visit Vitals  BP (!) 140/83   Pulse 86   Temp 36.6 °C (97.9 °F)   Resp 11   Ht 5' 10\" (1.778 m)   Wt 94.8 kg (209 lb)   SpO2 98%   BMI 29.99 kg/m²       Patient is status post Procedure(s):  LEFT TOTAL HIP REPLACEMENT,  ANTERIOR APPROACH WITH C-ARM. Nausea/Vomiting: Controlled. Postoperative hydration reviewed and adequate. Pain:  Pain Scale 1: Numeric (0 - 10) (05/23/22 1739)  Pain Intensity 1: 4 (05/23/22 1739)   Managed. Neurological Status:   Neuro (WDL): Exceptions to WDL (05/23/22 1725)   At baseline. Mental Status and Level of Consciousness: Baseline and appropriate for discharge. Pulmonary Status:   O2 Device: None (Room air) (05/23/22 1739)   Adequate oxygenation and airway patent. Complications related to anesthesia: None    Post-anesthesia assessment completed. No concerns. Patient has met all discharge requirements.     Signed By: Joshua Alvarado MD    May 23, 2022

## 2022-05-23 NOTE — INTERVAL H&P NOTE
Update History & Physical    The Patient's History and Physical of May 19,   2022 was reviewed with the patient and I examined the patient. There was no change. The surgical site was confirmed by the patient and me. Plan:  The risk, benefits, expected outcome, and alternative to the recommended procedure have been discussed with the patient. Patient understands and wants to proceed with the procedure.     Electronically signed by Johnson Hardy MD on 5/23/2022 at 1:26 PM

## 2022-05-23 NOTE — PROGRESS NOTES
Problem: Mobility Impaired (Adult and Pediatric)  Goal: *Acute Goals and Plan of Care (Insert Text)  Description: In 1-7 days pt will be able to perform:  ST.  Bed mobility:  Rolling L to R to L modified independent for positioning. 2.  Supine to sit to supine S with HR for meals. 3.  Sit to stand to sit S with RW in prep for ambulation. LT.  Gait:  Ambulate >150ft S with RW, WBAT, for home/community mobility. 2.  Stair Negotiation:  Ascend/descend >2 steps CGA with HR for home entry. 3.  Activity tolerance: Tolerate up in chair 1-2 hours for ADL's.  4.  Patient/Family Education:  Patient/family to be independent with HEP for follow-up care and safe discharge. Note: []  Patient has met MD mobilization crisarah for d/c home   []  Recommend HH with 24 hour adult care   [x]  Benefit from additional acute PT session to address:  transfer training, gait training, stair training    PHYSICAL THERAPY EVALUATION    Patient: Tricia Felix (23 y.o. female)  Date: 2022  Primary Diagnosis: LEFT HIP OSTEOARTHRITIS  Procedure(s) (LRB):  LEFT TOTAL HIP REPLACEMENT,  ANTERIOR APPROACH WITH C-ARM (Left) Day of Surgery   Precautions:   Fall,WBAT    PLOF: Independent w/ use of SPC    ASSESSMENT :  Based on the objective data described below, the patient presents with decreased mobility in regards to bed mobility, transfers, gt quality and tolerance, balance, stair negotiation and safety due to L LAURA surgery. Decreased AROM of L hip, dec strength of L hip, pain in L hip, dec sensation of L hip also impacting pt functional mobility. Pt rating pain on numerical pain scale pre/post and during session 10/10. Pt seen w/ OT for optimal safety and need for skilled hands. Pt extremely drowsy and had difficulty keeping eyes open. Pt initially rotated on stretcher to R side w/o pillow b/w knees. Adjusted pt position for more neutral position. Pt cont to c/o pain in L hip and Nurse Elicia Martinez administered pain med. Pt required increased encouragement to participate w/ sitting edge of stretcher. Pt able to perform supine<>sit w/ min/mod Ax1-2. Sitting edge of stretcher pt BP dec from supine /75 to 85/38, 75/60 and 76/44 respectively. Pt assisted to supine w/ mod Ax2. Pt left supine in stretcher, blanket roll under L knee for improved positioning and comfort. Nurse Micki Espinoza w/ pt presently. Pt will need cont PT once BP stabilized and pt more attentive to fully participate and retain. Recommend HHPT with responsible adult care at least 24 hours vs SNF rehab upon hospital d/c depending on pt PT progress. Patient will benefit from skilled intervention to address the above impairments. Patient's rehabilitation potential is considered to be Fair  Factors which may influence rehabilitation potential include:   []         None noted  [x]         Mental ability/status  []         Medical condition  []         Home/family situation and support systems  []         Safety awareness  [x]         Pain tolerance/management  []         Other:      PLAN :  Recommendations and Planned Interventions:   [x]           Bed Mobility Training             []    Neuromuscular Re-Education  [x]           Transfer Training                   []    Orthotic/Prosthetic Training  [x]           Gait Training                          [x]    Modalities  [x]           Therapeutic Exercises           [x]    Edema Management/Control  [x]           Therapeutic Activities            [x]    Family Training/Education  [x]           Patient Education  []           Other (comment):    Frequency/Duration: Patient will be followed by physical therapy 1-2 times per day/4-7 days per week to address goals. Discharge Recommendations: To Be Determined  Further Equipment Recommendations for Discharge: N/A     SUBJECTIVE:   Patient stated I am hurting so badly.     OBJECTIVE DATA SUMMARY:     Past Medical History:   Diagnosis Date    Arthritis     osteo Difficult intubation     esophagus needs to be stretch every 2 years, had done 03/2022    GERD (gastroesophageal reflux disease)     relux, and hiatal hernia    Hypertension     Osteoarthritis of right knee 2/1/2013    Other ill-defined conditions(799.89)     hiatal hernia    Psychiatric disorder     depression    S/P total knee arthroplasty 02/04/2013     Past Surgical History:   Procedure Laterality Date    HX CATARACT REMOVAL      left eye    HX CHOLECYSTECTOMY      laparoscopic    HX COLONOSCOPY      had 2 done, f/u in 10 years    HX HEENT  04/2022    repair detached retena    HX HYSTERECTOMY      TVH    HX KNEE ARTHROSCOPY      right knee replacement    HX OOPHORECTOMY      BSO    HX ORTHOPAEDIC      manipulation of left shoulder under anesthesia     Barriers to Learning/Limitations: yes;  physical; anesthesia  Compensate with: Visual Cues, Verbal Cues, and Tactile Cues  Home Situation:  Home Situation  Home Environment: Private residence  # Steps to Enter: 3  Rails to Enter: Yes  Hand Rails : Bilateral  One/Two Story Residence: One story  Living Alone: No  Support Systems: Spouse/Significant Other  Patient Expects to be Discharged to[de-identified] Home with home health  Current DME Used/Available at Home: Oanh Ronni, straight,Walker, rolling,Raised toilet seat  Tub or Shower Type: Shower (seat)  Critical Behavior:  Neurologic State: Drowsy  Orientation Level: Oriented to person  Cognition: Decreased command following;Decreased attention/concentration  Safety/Judgement: Awareness of environment  Psychosocial  Patient Behaviors: Calm; Cooperative;Lethargic  Skin Condition/Temp: Warm  Skin Integrity: Incision (comment) (same as preop)  Skin Integumentary  Skin Color: Appropriate for ethnicity  Skin Condition/Temp: Warm  Skin Integrity: Incision (comment) (same as preop)  Strength:    Strength: (P) Generally decreased, functional  Tone & Sensation:   Tone: (P) Normal  Sensation: (P) Impaired (L hip)  Range Of Motion:  AROM: (P) Generally decreased, functional  Posture:  Functional Mobility:  Bed Mobility:  Supine to Sit: Minimum assistance;Assist x2; Additional time  Sit to Supine: Moderate assistance;Minimum assistance;Assist x2; Additional time (vc)  Transfers:  Balance:   Sitting: (P) Impaired  Sitting - Static: Fair (occasional)  Wheelchair Mobility:  Ambulation/Gait Training:  Left Side Weight Bearing: As tolerated  Therapeutic Exercises:   Pain:  Pain level pre-treatment: 10/10   Pain level post-treatment: 10/10   Pain Intervention(s) : Medication (see MAR); Rest, Ice, Repositioning  Response to intervention: Nurse notified, See doc flow    Activity Tolerance:   Poor   Please refer to the flowsheet for vital signs taken during this treatment. After treatment:   []         Patient left in no apparent distress sitting up in chair  [x]         Patient left in no apparent distress in stretcher  []         Call bell left within reach  [x]         Nursing notified  []         Caregiver present  []         Bed alarm activated  []         SCDs applied    COMMUNICATION/EDUCATION:   [x]         Role of Physical Therapy in the acute care setting. [x]         Fall prevention education was provided and the patient/caregiver indicated understanding. [x]         Patient/family have participated as able in goal setting and plan of care. [x]         Patient/family agree to work toward stated goals and plan of care. []         Patient understands intent and goals of therapy, but is neutral about his/her participation. []         Patient is unable to participate in goal setting/plan of care: ongoing with therapy staff.  []         Other:     Thank you for this referral.  Liya Dover, PT   Time Calculation: 27 mins      Eval Complexity: History: HIGH Complexity :3+ comorbidities / personal factors will impact the outcome/ POC Exam:MEDIUM Complexity : 3 Standardized tests and measures addressing body structure, function, activity limitation and / or participation in recreation  Presentation: MEDIUM Complexity : Evolving with changing characteristics  Clinical Decision Making:High Complexity    Overall Complexity:MEDIUM

## 2022-05-23 NOTE — DISCHARGE SUMMARY
Total Hip Discharge Summary    Patient: Chriss Magdaleno               Sex: female         MRN: [id number]       YOB: 1950      Age:  67 y.o.        LOS:  LOS: 0 days                DOA: 5/23/2022    Discharge Date: 5/24/2022    Admission Diagnoses: Status post left hip replacement [Z96.642]    Discharge Diagnoses:    Problem List as of 5/24/2022 Date Reviewed: 2/1/2013          Codes Class Noted - Resolved    Status post left hip replacement ICD-10-CM: Y59.175  ICD-9-CM: V43.64  5/23/2022 - Present        S/P total knee arthroplasty ICD-10-CM: Z96.659  ICD-9-CM: V43.65  2/4/2013 - Present        RESOLVED: Osteoarthritis of right knee ICD-10-CM: M17.11  ICD-9-CM: 715.96  2/1/2013 - 2/4/2013              This is a 67 y.o. female with a  history of ongoing hip pain secondary to degenerative joint disease. The patient has failed to respond to conservative care. The option of a total hip arthroplasty, anterior approach was discussed with the patient. Risks and benefits of the procedure were explained to the patient as well as other treatment options and possible surgical outcomes. The patient acknowledged and consent was obtained. The patient was therefore scheduled to undergo a left total hip arthroplasty with Dr. Layla Blackburn. The patient was taken to the operating room for the above-stated procedure. IV antibiotics were given prior to the incision. SCDs were used for DVT prophylaxis. The patient had an estimated intraoperative blood loss of 700 mL. The patient tolerated the procedure well without any complications, and was taken to the recovery room in stable condition. The patient was then transferred to the postoperative orthopedic floor for convalescence, PT, pain management, as well as discharge planning. Physical therapy and occupational therapy initiated their evaluation and treatment and continued to follow the patient until the patient was discharged.  Post operative pain was adequately managed with use of oral narcotics prior to discharge. DVT prophylaxis was initiated on the day of surgery including; aspirin, compression stockings and bilateral foot pumps. At the time of discharge, the patient was able to ambulate safely, go up and down stairs and had an understanding of the explicit discharge precautions and instructions following surgery. Home Health will come out to assist the patient with this. The patient was discharged to follow up with Dr. Sudha Varma in approximately 10 to 14 days. Discharge Condition: Good  DISPOSITION: To home. On the day of discharge the patient was afebrile. Vital signs were stable. The patient was in no acute distress. her Left hip incision was clean, dry, and intact. Extremity was warm and well-perfused, distally neurovascularly intact. DISCHARGE INSTRUCTIONS:  The patient will be discharged home on Aspirin 81 mg PO BID x 1 month for DVT prophylaxis. Continue physical therapy for gait training and strengthening. Continue therapeutic exercises. Follow up in 10 to 14 days with Dr. Sudha Varma. DISCHARGE MEDICATIONS:   Current Discharge Medication List      START taking these medications    Details   cephALEXin (KEFLEX) 500 mg capsule Take 1 Capsule by mouth four (4) times daily for 2 days. Qty: 8 Capsule, Refills: 0      aspirin delayed-release 81 mg tablet Take 1 Tablet by mouth two (2) times a day. Qty: 60 Tablet, Refills: 0      naloxone (NARCAN) 4 mg/actuation nasal spray Use 1 spray intranasally, then discard. Repeat with new spray every 2 min as needed for opioid overdose symptoms, alternating nostrils. Qty: 1 Each, Refills: 0         CONTINUE these medications which have CHANGED    Details   oxyCODONE-acetaminophen (PERCOCET) 5-325 mg per tablet Take 1-2 Tablets by mouth every four (4) hours as needed for Pain for up to 3 days. Max Daily Amount: 12 Tablets.   Qty: 40 Tablet, Refills: 0    Associated Diagnoses: S/P hip replacement, left         CONTINUE these medications which have NOT CHANGED    Details   flavoxate HCl (FLAVOXATE PO) Take  by mouth. fluvoxaMINE (LUVOX) 50 mg tablet Take 50 mg by mouth nightly. Indications: depression      linaCLOtide (Linzess) 145 mcg cap capsule Take 145 mg by mouth. Indications: IBS      clonazePAM (KlonoPIN) 1 mg tablet Take 1 mg by mouth two (2) times a day. triamterene-hydroCHLOROthiazide (DYAZIDE) 37.5-25 mg per capsule Take 1 Capsule by mouth daily. Dexlansoprazole (DEXILANT) 60 mg CpDM Take 1 Cap by mouth daily. Pt instructed to take with a small bit of water on DOS      escitalopram (LEXAPRO) 20 mg tablet Take 20 mg by mouth daily. ARIPiprazole (ABILIFY) 2 mg tablet Take 10 mg by mouth nightly. Indications: depression      amitriptyline (ELAVIL) 100 mg tablet Take 100 mg by mouth two (2) times a day.  Indications: helps with sleep         STOP taking these medications       diclofenac EC (VOLTAREN) 75 mg EC tablet Comments:   Reason for Stopping:         oxyCODONE IR (ROXICODONE) 10 mg Tab immediate release tablet Comments:   Reason for Stopping:

## 2022-05-23 NOTE — PROGRESS NOTES
Problem: Self Care Deficits Care Plan (Adult)  Goal: *Acute Goals and Plan of Care (Insert Text)  Description: Initial Occupational Therapy Goals (5/23/2022) Within 7 day(s):    1. Patient will perform grooming standing sinkside with supervision for increased independence with ADLs. 2. Patient will perform LB dressing with supervision & A/E PRN for increased independence with ADLs. 3. Patient will perform toilet transfer with supervision for increased independence with ADLs. 4. Patient will perform all aspects of toileting with supervision for increased independence with ADLs. 5. Patient will independently apply energy conservation techniques with 1 verbal cue(s)for increased independence with ADLs. 6. Patient will perform bathroom mobility with supervision for increased independence/safety with ADLs. Outcome: Progressing Towards Goal     OCCUPATIONAL THERAPY EVALUATION    Patient: Sourav Hardwick (8 y.o. female)  Date: 5/23/2022  Primary Diagnosis: LEFT HIP OSTEOARTHRITIS  Procedure(s) (LRB):  LEFT TOTAL HIP REPLACEMENT,  ANTERIOR APPROACH WITH C-ARM (Left) Day of Surgery   Precautions: Fall,WBAT  PLOF: pt mod I for ADLs/functional mobility    ASSESSMENT AND RECOMMENDATIONS:  Based on the objective data described below, the patient presents with LLE decreased ROM and strength affecting LE ADLs. Pt seen with PT for additional set of skilled hands. Pt found supine in bed, /75, pt reporting pain 10/10. Pt very drowsy throughout session. Upon starting to sit up to EOB pt stating she cannot move d/t pain, RN present to administer pain medication. Assisted pt to adjust on stretcher for comfort, LLE resting in internal rotation. Pt sat up to EOB with min Ax2, and requires additional time for mobility. Pt able to sit EOB ~3 minutes. Pt then reporting dizziness, BP 85/38. Pt assisted back to supine with mod Ax2, BP 75/60. RN notified and present. Pt not safe for OOB ADLs at this time.  Patient will benefit from skilled Occupational Therapy intervention to maximize safety/independence with ADLs at d/c.    Education: Reviewed home safety, body mechanics, importance of moving every hour to prevent joint stiffness, role of ice for edema/pain control, Rolling Walker management/safety, and adaptive dressing techniques with patient verbalizing  understanding at this time     Patient will benefit from skilled intervention to address the above impairments. Patient's rehabilitation potential is considered to be Good  Factors which may influence rehabilitation potential include:   []             None noted  []             Mental ability/status  [x]             Medical condition  []             Home/family situation and support systems  []             Safety awareness  [x]             Pain tolerance/management  []             Other:        PLAN :  Recommendations and Planned Interventions:   [x]               Self Care Training                  [x]      Therapeutic Activities  [x]               Functional Mobility Training   []      Cognitive Retraining  []               Therapeutic Exercises           [x]      Endurance Activities  [x]               Balance Training                    []      Neuromuscular Re-Education  []               Visual/Perceptual Training     [x]      Home Safety Training  [x]               Patient Education                   [x]      Family Training/Education  []               Other (comment):    Frequency/Duration: Patient will be followed by Occupational Therapy 1-2 times per day/4-7 days per week to address goals. Discharge Recommendations: Home health   Further Equipment Recommendations for Discharge: N/A     SUBJECTIVE:   Patient stated I just can't do it.     OBJECTIVE DATA SUMMARY:     Past Medical History:   Diagnosis Date    Arthritis     osteo    Difficult intubation     esophagus needs to be stretch every 2 years, had done 03/2022    GERD (gastroesophageal reflux disease)     relux, and hiatal hernia    Hypertension     Osteoarthritis of right knee 2/1/2013    Other ill-defined conditions(799.89)     hiatal hernia    Psychiatric disorder     depression    S/P total knee arthroplasty 02/04/2013     Past Surgical History:   Procedure Laterality Date    HX CATARACT REMOVAL      left eye    HX CHOLECYSTECTOMY      laparoscopic    HX COLONOSCOPY      had 2 done, f/u in 10 years    HX HEENT  04/2022    repair detached retena    HX HYSTERECTOMY      TVH    HX KNEE ARTHROSCOPY      right knee replacement    HX OOPHORECTOMY      BSO    HX ORTHOPAEDIC      manipulation of left shoulder under anesthesia     Barriers to Learning/Limitations: yes;  physical and other (post-anesthesia)  Compensate with: visual, verbal, tactile, kinesthetic cues/model    Home Situation/Prior Level of Function:   Home Situation  Home Environment: Private residence  # Steps to Enter: 3  Rails to Enter: Yes  Hand Rails : Bilateral  One/Two Story Residence: One story  Living Alone: No  Support Systems: Spouse/Significant Other  Patient Expects to be Discharged to[de-identified] Home with home health  Current DME Used/Available at Home: [employer] , straight,Walker, rolling,Raised toilet seat  Tub or Shower Type: Shower (seat)  []  Right hand dominant   []  Left hand dominant    Cognitive/Behavioral Status:  Neurologic State: Drowsy  Orientation Level: Oriented to person  Cognition: Decreased command following;Decreased attention/concentration  Safety/Judgement: Awareness of environment    Skin: L hip incision w/ Mepilex   Edema: compression hose in place & applied ice     Coordination: BUE  Coordination: Generally decreased, functional  Fine Motor Skills-Upper: Left Intact; Right Intact    Gross Motor Skills-Upper: Left Intact; Right Intact    Balance:  Sitting: Impaired  Sitting - Static: Fair (occasional)    Strength: BUE  Strength: Generally decreased, functional    Tone & Sensation:BUE  Tone: Normal  Sensation: Impaired (L hip)    Range of Motion: BUE  AROM: Generally decreased, functional    Functional Mobility and Transfers for ADLs:  Bed Mobility:  Supine to Sit: Minimum assistance;Assist x2; Additional time  Sit to Supine: Moderate assistance;Minimum assistance;Assist x2; Additional time (vc)    ADL Assessment:  Feeding: Setup;Supervision  Oral Facial Hygiene/Grooming: Contact guard assistance  Bathing: Maximum assistance  Upper Body Dressing: Minimum assistance  Lower Body Dressing: Total assistance  Toileting: Moderate assistance    ADL Intervention:  Cognitive Retraining  Safety/Judgement: Awareness of environment    Pain:  Pain level pre-treatment: 10/10  Pain level post-treatment: 10/10  Pain Intervention(s): Medication administer by Nursing (see MAR); Rest, Ice, Repositioning   Response to intervention: Nurse notified, see doc flow     Activity Tolerance:   Fair-. Patient able to sit EOB ~3 minute(s). Patient limited by pain, strength, ROM, drowsiness. Patient unsteady. Please refer to the flowsheet for vital signs taken during this treatment. After treatment:   []  Patient left in no apparent distress sitting up in chair  []  Patient sitting on EOB  [x]  Patient left in no apparent distress in bed  [x]  Call bell left within reach  [x]  Nursing notified  []  Caregiver present  [x]  Ice applied  []  SCD's on while back in bed  [] Bed alarm activated    COMMUNICATION/EDUCATION:   Communication/Collaboration:  [x]       Role of Occupational Therapy in the acute care setting. [x]      Home safety education was provided and the patient/caregiver indicated understanding. [x]      Patient/family have participated as able in goal setting and plan of care. [x]      Patient/family agree to work toward stated goals and plan of care. []      Patient understands intent and goals of therapy, but is neutral about his/her participation. []      Patient is unable to participate in plan of care at this time.     Thank you for this referral.  Rayna Dillon OTR/L  Time Calculation: 25 mins    Eval Complexity: History: MEDIUM Complexity : Expanded review of history including physical, cognitive and psychosocial  history ; Examination: MEDIUM Complexity : 3-5 performance deficits relating to physical, cognitive , or psychosocial skils that result in activity limitations and / or participation restrictions; Decision Making:MEDIUM Complexity : Patient may present with comorbidities that affect occupational performnce.  Miniml to moderate modification of tasks or assistance (eg, physical or verbal ) with assesment(s) is necessary to enable patient to complete evaluation

## 2022-05-23 NOTE — PERIOP NOTES
18 [facility] made aware that SBAR is ready for review. Patient assigned room #202. Latoya uRiz RN will be the nurse.

## 2022-05-23 NOTE — ANESTHESIA PREPROCEDURE EVALUATION
Relevant Problems   No relevant active problems       Anesthetic History     PONV   Pertinent negatives: No increased risk of difficult airway       Review of Systems / Medical History  Patient summary reviewed, nursing notes reviewed and pertinent labs reviewed    Pulmonary  Within defined limits            Pertinent negatives: No COPD, asthma and sleep apnea     Neuro/Psych         Psychiatric history (Depression)  Pertinent negatives: No seizures and CVA   Cardiovascular    Hypertension            Pertinent negatives: No past MI and CHF       GI/Hepatic/Renal     GERD        Pertinent negatives: No liver disease and renal disease   Endo/Other        Arthritis  Pertinent negatives: No diabetes   Other Findings              Physical Exam    Airway  Mallampati: I  TM Distance: 4 - 6 cm  Neck ROM: normal range of motion   Mouth opening: Normal     Cardiovascular               Dental    Dentition: Edentulous, Full lower dentures and Full upper dentures     Pulmonary  Breath sounds clear to auscultation               Abdominal  GI exam deferred       Other Findings            Anesthetic Plan    ASA: 2  Anesthesia type: general          Induction: Intravenous  Anesthetic plan and risks discussed with: Patient

## 2022-05-23 NOTE — BRIEF OP NOTE
Brief Postoperative Note    Patient: Elda Serna  YOB: 1950  MRN: [id number]    Date of Procedure: 5/23/2022     Pre-Op Diagnosis: LEFT HIP OSTEOARTHRITIS    Post-Op Diagnosis: Same as preoperative diagnosis. Procedure(s):  LEFT TOTAL HIP REPLACEMENT,  ANTERIOR APPROACH WITH C-ARM    Surgeon(s):  Jenniffer Alford MD    Surgical Assistant: Physician Assistant: Nya Jefferson PA-C    Anesthesia: General     Estimated Blood Loss (mL): 397    Complications: None    Specimens: * No specimens in log *     Implants:   Implant Name Type Inv.  Item Serial No.  Lot No. LRB No. Used Action   CLUSTERHOLE  ACETABULAR SHELL    [employer] CLUSTERHOLE  Left 1 Implanted   SCR HEX 6.5X25MM -- TRIDENT II - [id number]  SCR HEX 6.5X25MM -- TRIDENT II  [employer] [facility] Left 1 Implanted   LINER MDM COCR 42MM E --  - [id number]  LINER MDM COCR 42MM E --   [employer] [facility] [id number] Left 1 Implanted   INSIGNIA HIP STEM-HIGH OFFSET    [employer] [id number] Left 1 Implanted   HEAD FEM LFRIC V40 28X4MM COCR --  - [id number]  HEAD FEM LFRIC V40 28X4MM COCR --   [employer] [facility] [id number] Left 1 Implanted   INSERT ACET CUP X3 [id number] -- RESTORATION ADM - [id number]  INSERT ACET CUP X3 [id number] -- RESTORATION ADM  [employer] [facility] [id number] Left 1 Implanted       Drains: * No LDAs found *    Findings: Severe DJD    Electronically Signed by Miesha Lainez PA-C on 5/23/2022 at 4:58 PM

## 2022-05-23 NOTE — PERIOP NOTES
Dr. Lolly Mack at bedside, [facility]. PA will enter post op orders, ok to enter pain medication for the floor.  Percocet 1-2 tabs

## 2022-05-23 NOTE — PERIOP NOTES
Reviewed PTA medication list with patient/caregiver and patient/caregiver denies any additional medications. Patient admits to having a responsible adult care for them at home for at least 24 hours after surgery. Patient encouraged to use gown warming system and informed that using said warming gown to regulate body temperature prior to a procedure has been shown to help reduce the risks of blood clots and infection. Patient's pharmacy of choice verified and documented in PTA medication section. Dual skin assessment & fall risk band verification completed with TERRENCE Le RN.

## 2022-05-24 VITALS
WEIGHT: 209 LBS | OXYGEN SATURATION: 97 % | RESPIRATION RATE: 18 BRPM | BODY MASS INDEX: 29.92 KG/M2 | TEMPERATURE: 97.6 F | SYSTOLIC BLOOD PRESSURE: 131 MMHG | HEIGHT: 70 IN | DIASTOLIC BLOOD PRESSURE: 67 MMHG | HEART RATE: 104 BPM

## 2022-05-24 LAB
ANION GAP SERPL CALC-SCNC: 3 MMOL/L (ref 3–18)
BUN SERPL-MCNC: 17 MG/DL (ref 7–18)
BUN/CREAT SERPL: 13 (ref 12–20)
CALCIUM SERPL-MCNC: 8.1 MG/DL (ref 8.5–10.1)
CHLORIDE SERPL-SCNC: 102 MMOL/L (ref 100–111)
CO2 SERPL-SCNC: 31 MMOL/L (ref 21–32)
CREAT SERPL-MCNC: 1.3 MG/DL (ref 0.6–1.3)
GLUCOSE SERPL-MCNC: 129 MG/DL (ref 74–99)
HCT VFR BLD AUTO: 26.6 % (ref 35–45)
HGB BLD-MCNC: 8.7 G/DL (ref 12–16)
POTASSIUM SERPL-SCNC: 4.2 MMOL/L (ref 3.5–5.5)
SODIUM SERPL-SCNC: 136 MMOL/L (ref 136–145)

## 2022-05-24 PROCEDURE — 77030012890

## 2022-05-24 PROCEDURE — 74011250636 HC RX REV CODE- 250/636: Performed by: PHYSICIAN ASSISTANT

## 2022-05-24 PROCEDURE — G0378 HOSPITAL OBSERVATION PER HR: HCPCS

## 2022-05-24 PROCEDURE — 97116 GAIT TRAINING THERAPY: CPT

## 2022-05-24 PROCEDURE — 74011250637 HC RX REV CODE- 250/637: Performed by: PHYSICIAN ASSISTANT

## 2022-05-24 PROCEDURE — 97535 SELF CARE MNGMENT TRAINING: CPT

## 2022-05-24 PROCEDURE — 74011250637 HC RX REV CODE- 250/637: Performed by: ANESTHESIOLOGY

## 2022-05-24 PROCEDURE — 74011000250 HC RX REV CODE- 250: Performed by: PHYSICIAN ASSISTANT

## 2022-05-24 RX ADMIN — HYDROCODONE BITARTRATE AND ACETAMINOPHEN 1 TABLET: 5; 325 TABLET ORAL at 07:14

## 2022-05-24 RX ADMIN — CEFAZOLIN 2 G: 10 INJECTION, POWDER, FOR SOLUTION INTRAVENOUS at 06:48

## 2022-05-24 RX ADMIN — ASPIRIN 81 MG: 81 TABLET, COATED ORAL at 08:05

## 2022-05-24 RX ADMIN — CLONAZEPAM 1 MG: 0.5 TABLET ORAL at 08:05

## 2022-05-24 RX ADMIN — TRIAMTERENE AND HYDROCHLOROTHIAZIDE 1 TABLET: 37.5; 25 TABLET ORAL at 08:05

## 2022-05-24 RX ADMIN — PANTOPRAZOLE SODIUM 40 MG: 40 TABLET, DELAYED RELEASE ORAL at 06:48

## 2022-05-24 RX ADMIN — OXYCODONE AND ACETAMINOPHEN 1 TABLET: 5; 325 TABLET ORAL at 13:09

## 2022-05-24 RX ADMIN — ESCITALOPRAM OXALATE 20 MG: 10 TABLET ORAL at 08:04

## 2022-05-24 NOTE — PROGRESS NOTES
Problem: Mobility Impaired (Adult and Pediatric)  Goal: *Acute Goals and Plan of Care (Insert Text)  Description: In 1-7 days pt will be able to perform:  ST.  Bed mobility:  Rolling L to R to L modified independent for positioning. 2.  Supine to sit to supine S with HR for meals. 3.  Sit to stand to sit S with RW in prep for ambulation. LT.  Gait:  Ambulate >150ft S with RW, WBAT, for home/community mobility. 2.  Stair Negotiation:  Ascend/descend >2 steps CGA with HR for home entry. 3.  Activity tolerance: Tolerate up in chair 1-2 hours for ADL's.  4.  Patient/Family Education:  Patient/family to be independent with HEP for follow-up care and safe discharge. Outcome: Progressing Towards Goal   [x]  Patient has met MD mobilization karey for d/c home   []  Recommend HH with 24 hour adult care   []  Benefit from additional acute PT session to address:      PHYSICAL THERAPY TREATMENT    Patient: Sourav Hardwick (19 y.o. female)  Date: 2022  Diagnosis: Status post left hip replacement [Z96.642] <principal problem not specified>  Procedure(s) (LRB):  LEFT TOTAL HIP REPLACEMENT,  ANTERIOR APPROACH WITH C-ARM (Left) 1 Day Post-Op  Precautions: Fall,WBAT  PLOF: ambulatory with a cane or RW    ASSESSMENT:  Pt in bed upon arrival.  Increased time needed to sit up EOB. Elevated bed to height of bed at home, no difficulty performing sit to stand. Ambulated 120ft with RW, step to gt pattern, steady slow pace, no LOB or path deviations. Seated rest break before and after stair training. Negotiated 2 steps holding L hand rail with (B) hands and progressing laterally. Returned to supine in bed.   Progression toward goals:   [x]      Improving appropriately and progressing toward goals  []      Improving slowly and progressing toward goals  []      Not making progress toward goals and plan of care will be adjusted     PLAN:  Patient continues to benefit from skilled intervention to address the above impairments. Continue treatment per established plan of care. Discharge Recommendations:  Home Physical Therapy  Further Equipment Recommendations for Discharge:  rolling walker     SUBJECTIVE:   Patient stated How long before I can use a cane?     OBJECTIVE DATA SUMMARY:   Critical Behavior:  Neurologic State: Alert  Orientation Level: Oriented X4  Cognition: Appropriate decision making,Appropriate for age attention/concentration,Appropriate safety awareness,Follows commands  Safety/Judgement: Awareness of environment  Functional Mobility Training:  Bed Mobility:    Supine to Sit: Supervision  Sit to Supine: Supervision  Scooting: Supervision  Transfers:  Sit to Stand: Supervision  Stand to Sit: Supervision  Balance:  Sitting: Intact  Standing: Intact; With support   Ambulation/Gait Training:  Distance (ft): 120 Feet (ft)  Assistive Device: Gait belt;Walker, rolling  Ambulation - Level of Assistance: Supervision  Gait Abnormalities: Antalgic; Step to gait  Left Side Weight Bearing: As tolerated  Speed/Faye: Slow  Stairs:  Number of Stairs Trained: 2  Stairs - Level of Assistance: Supervision  Rail Use: Left         Pain:  Pain level pre-treatment: 4/10  Pain level post-treatment: 2/10   Pain Intervention(s): Medication (see MAR); Rest, Ice, Repositioning   Response to intervention: Nurse notified, See doc flow    Activity Tolerance:   fair  Please refer to the flowsheet for vital signs taken during this treatment. After treatment:   [] Patient left in no apparent distress sitting up in chair  [x] Patient left in no apparent distress in bed  [x] Call bell left within reach  [x] Nursing notified  [] Caregiver present  [] Bed alarm activated  [] SCDs applied      COMMUNICATION/EDUCATION:   [x]         Role of Physical Therapy in the acute care setting. [x]         Fall prevention education was provided and the patient/caregiver indicated understanding.   [x]         Patient/family have participated as able in working toward goals and plan of care. [x]         Patient/family agree to work toward stated goals and plan of care. []         Patient understands intent and goals of therapy, but is neutral about his/her participation.   []         Patient is unable to participate in stated goals/plan of care: ongoing with therapy staff.  []         Other:        Radha Hendricks, PTA   Time Calculation: 23 mins

## 2022-05-24 NOTE — PROGRESS NOTES
Discharge instructions reviewed with the patient and spouse. Discussed need to review pain medication preference with Surgeon as patient is a chronic pain patient. Patient verbalized understanding and verified by teach back. All questions answered.

## 2022-05-24 NOTE — PROGRESS NOTES
2005 - Patient arrives to unit at this time from [address]. Admission completed. Patient is A/O x 4. LS clear, on 2L NC. Hipolito Hoyles Abd soft, NT and ND, positive BS in all 4 quadrants. Denies nausea. IV to L FA  intact and patent. SCDs applied . Dressing to L hip CDI. Denies  numbness/tingling. Pain 4/10. Patient was oriented to the room to include use of call bell, meal ordering, and use of incentive spirometer. Patient was given explanation of \" up for dinner\" program and has verbalized understanding. Phone and call bell left within reach. 2130-Pt unable to void post op, reports that she always has this issue post op and has had to have genao caths after surgery. Does not want a straight cath, wants genao overnight. BS at [phone] [facility]cs. Paged on-call. 2142-Call back received from Dr Mitesh Sanchez. Jane Fallon ok to put in the genao and leave it in.    0020-Pt does not to get OOB and ambulate with staff, had a bad experience with PT yesterday and wants to wait and work with PT in AM.    0300-Pt enc again to get up and walk, says she wants to get up with PT this AM.    Pt with retention issues, will leave genao in for now for team to address. . Pain remained well-controlled, only took Norco this AM. No other issues/concerns at this time.  Call bell within reach

## 2022-05-24 NOTE — PROGRESS NOTES
Transition of Care (BRYNN) Plan:          Pt admitted for an elective surgical procedure ([address]). Pt has  walker. Please encourage ambulation. No transition of care needs identified at this time. Anticipate pt will be medically stable for discharge within the next 24-48 hours with physician follow up. CM available to assist as needed. CM spoke with patient and apouse at bedside. Patient states she has a rolloing walker and [facility] has called her. CM offered FOC, carlos alberto wants to use At [address]. Patient reports that she has experienced urinary retention 4 times after surgeries. CM spoke with Ortho PA who stated that the Ortho team did not plan to follow patient for urinary retention, per primary RN urology consult will be placed. Noted therapy recommendation. CM Met with pt/family and explained CM role and to discuss the plan of care. Pt and family are in agreement with therapy recommendations. Offered freedom of choice for SNF/HH. Provided a list of area agencies/facilities to refer to to assist family with making a determiniation. BRYNN Transportation:   How is patient being transported at discharge? Family/Friend Spouse      When? Once cleared by physician     Is transport scheduled? N/A      Follow-up appointment and transportation:   PCP/Specialist?  See AVS for Appointment         Who is transporting to the follow-up appointment? Self/Family/Friend      Is transport for follow up appointment scheduled? N/A    Communication plan (with patient/family): Who is being called? Patient or Next of Kin? Responsible party? Patient      What number(s) is to be used? See Facesheet      What service provider is calling for [facility] services? [facility] Home care          When are they calling? Readmission Risk?   (Green/Low; Yellow/Moderate; Red/High):  [employer] here to complete [address] including selection of the Healthcare Decision Maker Relationship (ie \"Primary\")    Care Management Interventions  PCP Verified by CM: Yes  Transition of Care Consult (CM Consult): [address]: No  Reason Outside [place]: Physician referred to specific agency  Discharge Durable Medical Equipment:  (has a walker)  Physical Therapy Consult: Yes  Occupational Therapy Consult: Yes  Support Systems: Spouse/Significant Other  Confirm Follow Up Transport: Family  The Plan for Transition of Care is Related to the Following Treatment Goals :   LONG TERM ACUTE CARE [facility] CARE AT [facility])  The Patient and/or Patient Representative was Provided with a Choice of Provider and Agrees with the Discharge Plan?: Yes  Freedom of Choice List was Provided with Basic Dialogue that Supports the Patient's Individualized Plan of Care/Goals, Treatment Preferences and Shares the Quality Data Associated with the Providers?: Yes  Discharge Location  Patient Expects to be Discharged to[de-identified]  (discharge home with [facility])

## 2022-05-24 NOTE — PROGRESS NOTES
Problem: Self Care Deficits Care Plan (Adult)  Goal: *Acute Goals and Plan of Care (Insert Text)  Description: Initial Occupational Therapy Goals (5/23/2022) Within 7 day(s):    1. Patient will perform grooming standing sinkside with supervision for increased independence with ADLs. 2. Patient will perform LB dressing with supervision & A/E PRN for increased independence with ADLs. 3. Patient will perform toilet transfer with supervision for increased independence with ADLs. 4. Patient will perform all aspects of toileting with supervision for increased independence with ADLs. 5. Patient will independently apply energy conservation techniques with 1 verbal cue(s)for increased independence with ADLs. 6. Patient will perform bathroom mobility with supervision for increased independence/safety with ADLs. Outcome: Progressing Towards Goal  OCCUPATIONAL THERAPY TREATMENT    Patient: Cherie Jaquez (33 y.o. female)  Date: 5/24/2022  Diagnosis: Status post left hip replacement [Z96.642] <principal problem not specified>  Procedure(s) (LRB):  LEFT TOTAL HIP REPLACEMENT,  ANTERIOR APPROACH WITH C-ARM (Left) 1 Day Post-Op  Precautions: Fall,WBAT    Chart, occupational therapy assessment, plan of care, and goals were reviewed. ASSESSMENT:  Pt found seated in chair, reporting pain 3/10, agreeable to therapy. Educated pt on proper body mechanics for ADLs s/p THR. Pt able to complete upper dressing with supervision, SBA when standing to adjust dress. Pt unable to complete sock donning without assist; provided/educated on use of sock aid for increased independence. Pt SBA for STS/bathroom mobility with vc for safe use of RW. Pt able to perform grooming tasks standing at sink with setup/SBA for ~3 minutes. Pt ambulated back to bed and sat EOB with SBA, ice applied to L hip. Discussed safe strategies with bathing when home. Spouse present during session for education on home safety.  Provided opportunity for pt to voice questions on ADL performance when home, pt has no further concerns. Pt left seated EOB, call bell/phone within reach. Progression toward goals:  [x]          Improving appropriately and progressing toward goals  []          Improving slowly and progressing toward goals  []          Not making progress toward goals and plan of care will be adjusted     PLAN:  Patient continues to benefit from skilled intervention to address the above impairments. Continue treatment per established plan of care. Discharge Recommendations:  Home Health  Further Equipment Recommendations for Discharge:  N/A     SUBJECTIVE:   Patient stated i'm feeling much better.     OBJECTIVE DATA SUMMARY:   Cognitive/Behavioral Status:  Neurologic State: Alert  Orientation Level: Oriented X4  Cognition: Appropriate decision making,Appropriate for age attention/concentration,Appropriate safety awareness,Follows commands  Safety/Judgement: Awareness of environment    Functional Mobility and Transfers for ADLs:   Bed Mobility:  Scooting: Stand-by assistance   Transfers:  Sit to Stand: Stand-by assistance (vc)   Bathroom Mobility: Stand-by assistance    Balance:  Sitting: Intact  Standing: Intact; With support    ADL Intervention:  Grooming  Grooming Assistance: Set-up; Stand-by assistance  Position Performed: Standing  Washing Face: Stand-by assistance  Washing Hands: Stand-by assistance  Brushing/Combing Hair: Stand-by assistance    Upper Body Dressing Assistance  Dressing Assistance: Stand-by assistance  Bra: Supervision  Pullover Shirt: Stand-by assistance (dress)    Cognitive Retraining  Safety/Judgement: Awareness of environment    Pain:  Pain level pre-treatment: 3/10   Pain level post-treatment: 2/10  Pain Intervention(s): Rest, Ice, Repositioning   Response to intervention: Nurse notified, See doc flow sheet    Activity Tolerance:    Fair.  Pt limited by pain, strength, ROM    Please refer to the flowsheet for vital signs taken during this treatment. After treatment:   []  Patient left in no apparent distress sitting up in chair  [x]  Patient left in no apparent distress seated EOB  [x]  Call bell left within reach  [x]  Nursing notified  []  Caregiver present  []  Bed alarm activated    COMMUNICATION/EDUCATION:   [x] Role of Occupational Therapy in the acute care setting  [x] Home safety education was provided and the patient/caregiver indicated understanding. [x] Patient/family have participated as able in working towards goals and plan of care. [x] Patient/family agree to work toward stated goals and plan of care. [] Patient understands intent and goals of therapy, but is neutral about his/her participation. [] Patient is unable to participate in goal setting and plan of care.       Thank you for this referral.  Hien Box OTR/L  Time Calculation: 33 mins

## 2022-05-24 NOTE — PROGRESS NOTES
[facility] care of pt at this time. Assessment complete. Pt alert and oriented x 4. Shows no sign of distress. Fall risk arm band in place. Denies SOB and chest pain. Pt lungs clear bilaterally. Cap refill  less than 3 seconds. Pt denies numbness and tingling to all extremities. Stated pain 2/10. Pt has 18 G IV to L forearm. Pt has optifoam mepilex dressing to left hip CDI . scds and TEDs applied to BLE. Incentive spirometer at bedside. Pt encouraged to continue use of IS can reach 2250. Pt verbalized understanding. Ice pack applied. Call light and possessions within reach. Bed locked and in low position. Will continue to monitor. 0835  Annie CARVALHO made aware that pt has genao cath placed lastnight due to retention. Pt refused to be straight cath and states \"this always happen after surgery they just leave genao in for few days\" Shasta Russell made aware and ok with pt d/c home with genao as long as HH can remove tomorrow. Nurse will touch base with case management to confirm. 1052  Pt ambulating in hallway with PT at this time     1101  Paged Annie LAROSE to inform that pt will need to be followed and have urology consult to d/c home with genao for retention. Awaiting for return call. Dr Medina Muñiz consulting today     1112  Shasta Russell aware will contact dr Patrice Mensah    [phone]  D/c instructions being reviewed with d/c nurse and pt.    200  Dr Medina Muñiz aware of consult states pt may d/c home and follow up with urology. Office number given to pt to make follow up appt.      1321  IV removed. Genao cath teaching discussed with pt and  as well as changing genao to leg bag. Pt and  verbalized understanding.

## 2022-05-24 NOTE — PROGRESS NOTES
Progress Note     Patient: Vipin Benitez MRN: [id number]  SSN: [id number]    YOB: 1950  Age: 67 y.o. Sex: female      POD:    1 Day Post-Op  S/P:    Procedure(s):  LEFT TOTAL HIP REPLACEMENT,  ANTERIOR APPROACH WITH C-ARM     Subjective:   Pt is with complaints of some pain in the hip. She was unable to ambulate with PT yesterday evening after surgery so she spent the night in the hospital. She had a genao catheter placed last night. Patient states that after every surgical procedure she has, she struggles with urinary retention. She notes this only happens after surgery. She has a genao catheter placed which she usually has pulled 2 days after surgery and she has no further problems with urinary retention. Objective:     Patient Vitals for the past 12 hrs:   BP Temp Pulse Resp SpO2   05/24/22 0714 131/67 97.6 °F (36.4 °C) (!) 104 18 97 %   05/24/22 0333 (!) 131/92 97.7 °F (36.5 °C) (!) 102 16 98 %   05/23/22 2310 (!) 125/55 97.9 °F (36.6 °C) (!) 102 16 97 %     Recent Labs     05/23/22  0545   HGB 8.7*   HCT 26.6*      K 4.2      CO2 31   BUN 17   CREA 1.30   *       Pt. Seated in chair. Lower extremity operative dressing clean/dry/intact. Neurovascularly intact. Assessment:     Awake and alert. In good spirits. Plan:   1. Continue pain management/ ice to operative area. 2. Continue to progress with PT/OT. 3. Discharge planning to home with home health today as long as she passes PT and her pain remains well controlled .

## 2022-05-25 ENCOUNTER — HOSPITAL ENCOUNTER (INPATIENT)
Age: 72
LOS: 2 days | Discharge: HOME | DRG: 468 | End: 2022-05-27
Attending: ORTHOPAEDIC SURGERY | Admitting: ORTHOPAEDIC SURGERY
Payer: MEDICARE

## 2022-05-25 ENCOUNTER — APPOINTMENT (OUTPATIENT)
Dept: GENERAL RADIOLOGY | Age: 72
DRG: 468 | End: 2022-05-25
Attending: ORTHOPAEDIC SURGERY
Payer: MEDICARE

## 2022-05-25 ENCOUNTER — ANESTHESIA (OUTPATIENT)
Dept: SURGERY | Age: 72
DRG: 468 | End: 2022-05-25
Payer: MEDICARE

## 2022-05-25 ENCOUNTER — ANESTHESIA EVENT (OUTPATIENT)
Dept: SURGERY | Age: 72
DRG: 468 | End: 2022-05-25
Payer: MEDICARE

## 2022-05-25 PROBLEM — Z96.649 STATUS POST REVISION OF TOTAL HIP REPLACEMENT: Status: ACTIVE | Noted: 2022-05-25

## 2022-05-25 LAB
ABO + RH BLD: NORMAL
BASOPHILS # BLD: 0 K/UL (ref 0–0.1)
BASOPHILS NFR BLD: 0 % (ref 0–2)
BLOOD GROUP ANTIBODIES SERPL: NORMAL
DIFFERENTIAL METHOD BLD: ABNORMAL
EOSINOPHIL # BLD: 0.1 K/UL (ref 0–0.4)
EOSINOPHIL NFR BLD: 1 % (ref 0–5)
ERYTHROCYTE [DISTWIDTH] IN BLOOD BY AUTOMATED COUNT: 13.8 % (ref 11.6–14.5)
HCT VFR BLD AUTO: 25.5 % (ref 35–45)
HGB BLD-MCNC: 8.5 G/DL (ref 12–16)
IMM GRANULOCYTES # BLD AUTO: 0 K/UL (ref 0–0.04)
IMM GRANULOCYTES NFR BLD AUTO: 0 % (ref 0–0.5)
LYMPHOCYTES # BLD: 0.9 K/UL (ref 0.9–3.6)
LYMPHOCYTES NFR BLD: 14 % (ref 21–52)
MCH RBC QN AUTO: 31.5 PG (ref 24–34)
MCHC RBC AUTO-ENTMCNC: 33.3 G/DL (ref 31–37)
MCV RBC AUTO: 94.4 FL (ref 78–100)
MONOCYTES # BLD: 0.5 K/UL (ref 0.05–1.2)
MONOCYTES NFR BLD: 8 % (ref 3–10)
NEUTS SEG # BLD: 4.8 K/UL (ref 1.8–8)
NEUTS SEG NFR BLD: 76 % (ref 40–73)
NRBC # BLD: 0 K/UL (ref 0–0.01)
NRBC BLD-RTO: 0 PER 100 WBC
PLATELET # BLD AUTO: 221 K/UL (ref 135–420)
PMV BLD AUTO: 8.9 FL (ref 9.2–11.8)
RBC # BLD AUTO: 2.7 M/UL (ref 4.2–5.3)
SPECIMEN EXP DATE BLD: NORMAL
WBC # BLD AUTO: 6.3 K/UL (ref 4.6–13.2)

## 2022-05-25 PROCEDURE — 74011000250 HC RX REV CODE- 250: Performed by: ORTHOPAEDIC SURGERY

## 2022-05-25 PROCEDURE — C1776 JOINT DEVICE (IMPLANTABLE): HCPCS | Performed by: ORTHOPAEDIC SURGERY

## 2022-05-25 PROCEDURE — 76060000036 HC ANESTHESIA 2.5 TO 3 HR: Performed by: ORTHOPAEDIC SURGERY

## 2022-05-25 PROCEDURE — 77030003666 HC NDL SPINAL BD -A: Performed by: ORTHOPAEDIC SURGERY

## 2022-05-25 PROCEDURE — 77030031139 HC SUT VCRL2 J&J -A: Performed by: ORTHOPAEDIC SURGERY

## 2022-05-25 PROCEDURE — 77030012712 HC WND ARTHRO ABLT S&N -E: Performed by: ORTHOPAEDIC SURGERY

## 2022-05-25 PROCEDURE — 65270000029 HC RM PRIVATE

## 2022-05-25 PROCEDURE — 77030012508 HC MSK AIRWY LMA AMBU -A: Performed by: ANESTHESIOLOGY

## 2022-05-25 PROCEDURE — 77030020782 HC GWN BAIR PAWS FLX 3M -B: Performed by: ORTHOPAEDIC SURGERY

## 2022-05-25 PROCEDURE — 74011250636 HC RX REV CODE- 250/636: Performed by: NURSE ANESTHETIST, CERTIFIED REGISTERED

## 2022-05-25 PROCEDURE — 0SRB02Z REPLACEMENT OF LEFT HIP JOINT WITH METAL ON POLYETHYLENE SYNTHETIC SUBSTITUTE, OPEN APPROACH: ICD-10-PCS | Performed by: ORTHOPAEDIC SURGERY

## 2022-05-25 PROCEDURE — 76010000132 HC OR TIME 2.5 TO 3 HR: Performed by: ORTHOPAEDIC SURGERY

## 2022-05-25 PROCEDURE — 77030018835 HC SOL IRR LR ICUM -A: Performed by: ORTHOPAEDIC SURGERY

## 2022-05-25 PROCEDURE — 2709999900 HC NON-CHARGEABLE SUPPLY: Performed by: ORTHOPAEDIC SURGERY

## 2022-05-25 PROCEDURE — 74011250636 HC RX REV CODE- 250/636: Performed by: ANESTHESIOLOGY

## 2022-05-25 PROCEDURE — 77030018673: Performed by: ORTHOPAEDIC SURGERY

## 2022-05-25 PROCEDURE — 85025 COMPLETE CBC W/AUTO DIFF WBC: CPT

## 2022-05-25 PROCEDURE — 74011000258 HC RX REV CODE- 258: Performed by: NURSE ANESTHETIST, CERTIFIED REGISTERED

## 2022-05-25 PROCEDURE — 77030040361 HC SLV COMPR DVT MDII -B: Performed by: ORTHOPAEDIC SURGERY

## 2022-05-25 PROCEDURE — 74011250636 HC RX REV CODE- 250/636: Performed by: ORTHOPAEDIC SURGERY

## 2022-05-25 PROCEDURE — 77030002933 HC SUT MCRYL J&J -A: Performed by: ORTHOPAEDIC SURGERY

## 2022-05-25 PROCEDURE — 77030034479 HC ADH SKN CLSR PRINEO J&J -B: Performed by: ORTHOPAEDIC SURGERY

## 2022-05-25 PROCEDURE — 77030035643 HC BLD SAW OSC PRECIS STRY -C: Performed by: ORTHOPAEDIC SURGERY

## 2022-05-25 PROCEDURE — 77030026044 HC TIP IRR PULS STRY -A: Performed by: ORTHOPAEDIC SURGERY

## 2022-05-25 PROCEDURE — 74011000250 HC RX REV CODE- 250: Performed by: NURSE ANESTHETIST, CERTIFIED REGISTERED

## 2022-05-25 PROCEDURE — 77030018846 HC SOL IRR STRL H20 ICUM -A: Performed by: ORTHOPAEDIC SURGERY

## 2022-05-25 PROCEDURE — 77030013708 HC HNDPC SUC IRR PULS STRY –B: Performed by: ORTHOPAEDIC SURGERY

## 2022-05-25 PROCEDURE — 86900 BLOOD TYPING SEROLOGIC ABO: CPT

## 2022-05-25 PROCEDURE — 77030042509 HC BIT DRL -C: Performed by: ORTHOPAEDIC SURGERY

## 2022-05-25 PROCEDURE — 76210000063 HC OR PH I REC FIRST 0.5 HR: Performed by: ORTHOPAEDIC SURGERY

## 2022-05-25 PROCEDURE — C1713 ANCHOR/SCREW BN/BN,TIS/BN: HCPCS | Performed by: ORTHOPAEDIC SURGERY

## 2022-05-25 DEVICE — LINER ACET SZ F ID46MM HIP X3 CO CHROM CEMENTLESS MOD 2: Type: IMPLANTABLE DEVICE | Site: HIP | Status: FUNCTIONAL

## 2022-05-25 DEVICE — X3 INSERT FOR ADM/ MDM
Type: IMPLANTABLE DEVICE | Site: HIP | Status: FUNCTIONAL
Brand: X3

## 2022-05-25 DEVICE — LFIT V40 FEMORAL HEAD
Type: IMPLANTABLE DEVICE | Site: HIP | Status: FUNCTIONAL
Brand: V40 HEAD

## 2022-05-25 DEVICE — SCR HEX 6.5X25MM -- TRIDENT II: Type: IMPLANTABLE DEVICE | Site: HIP | Status: FUNCTIONAL

## 2022-05-25 DEVICE — SHELL ACET CLUS H 56F TRTANIUM -- TRIDENT II: Type: IMPLANTABLE DEVICE | Site: HIP | Status: FUNCTIONAL

## 2022-05-25 RX ORDER — MIDAZOLAM HYDROCHLORIDE 1 MG/ML
INJECTION, SOLUTION INTRAMUSCULAR; INTRAVENOUS AS NEEDED
Status: DISCONTINUED | OUTPATIENT
Start: 2022-05-25 | End: 2022-05-25 | Stop reason: HOSPADM

## 2022-05-25 RX ORDER — ONDANSETRON 2 MG/ML
4 INJECTION INTRAMUSCULAR; INTRAVENOUS ONCE
Status: DISCONTINUED | OUTPATIENT
Start: 2022-05-26 | End: 2022-05-25 | Stop reason: HOSPADM

## 2022-05-25 RX ORDER — VANCOMYCIN HYDROCHLORIDE 1 G/20ML
INJECTION, POWDER, LYOPHILIZED, FOR SOLUTION INTRAVENOUS AS NEEDED
Status: DISCONTINUED | OUTPATIENT
Start: 2022-05-25 | End: 2022-05-25 | Stop reason: HOSPADM

## 2022-05-25 RX ORDER — AMITRIPTYLINE HYDROCHLORIDE 50 MG/1
100 TABLET, FILM COATED ORAL 2 TIMES DAILY
Status: DISCONTINUED | OUTPATIENT
Start: 2022-05-26 | End: 2022-05-27 | Stop reason: HOSPADM

## 2022-05-25 RX ORDER — ASPIRIN 81 MG/1
81 TABLET ORAL 2 TIMES DAILY
Status: DISCONTINUED | OUTPATIENT
Start: 2022-05-26 | End: 2022-05-27 | Stop reason: HOSPADM

## 2022-05-25 RX ORDER — KETAMINE HYDROCHLORIDE 10 MG/ML
INJECTION, SOLUTION INTRAMUSCULAR; INTRAVENOUS AS NEEDED
Status: DISCONTINUED | OUTPATIENT
Start: 2022-05-25 | End: 2022-05-25 | Stop reason: HOSPADM

## 2022-05-25 RX ORDER — ONDANSETRON 2 MG/ML
INJECTION INTRAMUSCULAR; INTRAVENOUS AS NEEDED
Status: DISCONTINUED | OUTPATIENT
Start: 2022-05-25 | End: 2022-05-25 | Stop reason: HOSPADM

## 2022-05-25 RX ORDER — SODIUM CHLORIDE 0.9 % (FLUSH) 0.9 %
5-40 SYRINGE (ML) INJECTION EVERY 8 HOURS
Status: DISCONTINUED | OUTPATIENT
Start: 2022-05-26 | End: 2022-05-27 | Stop reason: HOSPADM

## 2022-05-25 RX ORDER — SODIUM CHLORIDE, SODIUM LACTATE, POTASSIUM CHLORIDE, CALCIUM CHLORIDE 600; 310; 30; 20 MG/100ML; MG/100ML; MG/100ML; MG/100ML
INJECTION, SOLUTION INTRAVENOUS
Status: DISCONTINUED | OUTPATIENT
Start: 2022-05-25 | End: 2022-05-25 | Stop reason: HOSPADM

## 2022-05-25 RX ORDER — SODIUM CHLORIDE 0.9 % (FLUSH) 0.9 %
5-40 SYRINGE (ML) INJECTION AS NEEDED
Status: DISCONTINUED | OUTPATIENT
Start: 2022-05-25 | End: 2022-05-27 | Stop reason: HOSPADM

## 2022-05-25 RX ORDER — PANTOPRAZOLE SODIUM 40 MG/1
40 TABLET, DELAYED RELEASE ORAL
Status: DISCONTINUED | OUTPATIENT
Start: 2022-05-26 | End: 2022-05-27 | Stop reason: HOSPADM

## 2022-05-25 RX ORDER — CEFAZOLIN SODIUM/WATER 2 G/20 ML
2 SYRINGE (ML) INTRAVENOUS EVERY 8 HOURS
Status: COMPLETED | OUTPATIENT
Start: 2022-05-26 | End: 2022-05-26

## 2022-05-25 RX ORDER — INSULIN LISPRO 100 [IU]/ML
INJECTION, SOLUTION INTRAVENOUS; SUBCUTANEOUS ONCE
Status: DISCONTINUED | OUTPATIENT
Start: 2022-05-26 | End: 2022-05-25 | Stop reason: HOSPADM

## 2022-05-25 RX ORDER — ACETAMINOPHEN 325 MG/1
650 TABLET ORAL
Status: DISCONTINUED | OUTPATIENT
Start: 2022-05-25 | End: 2022-05-27 | Stop reason: HOSPADM

## 2022-05-25 RX ORDER — FENTANYL CITRATE 50 UG/ML
25 INJECTION, SOLUTION INTRAMUSCULAR; INTRAVENOUS AS NEEDED
Status: DISCONTINUED | OUTPATIENT
Start: 2022-05-25 | End: 2022-05-25 | Stop reason: HOSPADM

## 2022-05-25 RX ORDER — CLONAZEPAM 0.5 MG/1
1 TABLET ORAL 2 TIMES DAILY
Status: DISCONTINUED | OUTPATIENT
Start: 2022-05-26 | End: 2022-05-27 | Stop reason: HOSPADM

## 2022-05-25 RX ORDER — SUCCINYLCHOLINE CHLORIDE 100 MG/5ML
SYRINGE (ML) INTRAVENOUS AS NEEDED
Status: DISCONTINUED | OUTPATIENT
Start: 2022-05-25 | End: 2022-05-25 | Stop reason: HOSPADM

## 2022-05-25 RX ORDER — TRIAMTERENE/HYDROCHLOROTHIAZID 37.5-25 MG
1 TABLET ORAL DAILY
Status: DISCONTINUED | OUTPATIENT
Start: 2022-05-26 | End: 2022-05-27 | Stop reason: HOSPADM

## 2022-05-25 RX ORDER — HYDROCODONE BITARTRATE AND ACETAMINOPHEN 5; 325 MG/1; MG/1
2 TABLET ORAL
Status: DISCONTINUED | OUTPATIENT
Start: 2022-05-25 | End: 2022-05-27 | Stop reason: HOSPADM

## 2022-05-25 RX ORDER — ESCITALOPRAM OXALATE 10 MG/1
20 TABLET ORAL DAILY
Status: DISCONTINUED | OUTPATIENT
Start: 2022-05-26 | End: 2022-05-27 | Stop reason: HOSPADM

## 2022-05-25 RX ORDER — NALOXONE HYDROCHLORIDE 0.4 MG/ML
0.1 INJECTION, SOLUTION INTRAMUSCULAR; INTRAVENOUS; SUBCUTANEOUS AS NEEDED
Status: DISCONTINUED | OUTPATIENT
Start: 2022-05-25 | End: 2022-05-27 | Stop reason: HOSPADM

## 2022-05-25 RX ORDER — SODIUM CHLORIDE, SODIUM LACTATE, POTASSIUM CHLORIDE, CALCIUM CHLORIDE 600; 310; 30; 20 MG/100ML; MG/100ML; MG/100ML; MG/100ML
75 INJECTION, SOLUTION INTRAVENOUS CONTINUOUS
Status: DISPENSED | OUTPATIENT
Start: 2022-05-26 | End: 2022-05-26

## 2022-05-25 RX ORDER — HYDROMORPHONE HYDROCHLORIDE 1 MG/ML
0.5 INJECTION, SOLUTION INTRAMUSCULAR; INTRAVENOUS; SUBCUTANEOUS
Status: DISCONTINUED | OUTPATIENT
Start: 2022-05-25 | End: 2022-05-25 | Stop reason: HOSPADM

## 2022-05-25 RX ORDER — HYDROMORPHONE HYDROCHLORIDE 1 MG/ML
1 INJECTION, SOLUTION INTRAMUSCULAR; INTRAVENOUS; SUBCUTANEOUS
Status: DISCONTINUED | OUTPATIENT
Start: 2022-05-25 | End: 2022-05-27 | Stop reason: HOSPADM

## 2022-05-25 RX ORDER — BUPIVACAINE HYDROCHLORIDE AND EPINEPHRINE 5; 5 MG/ML; UG/ML
INJECTION, SOLUTION EPIDURAL; INTRACAUDAL; PERINEURAL AS NEEDED
Status: DISCONTINUED | OUTPATIENT
Start: 2022-05-25 | End: 2022-05-25 | Stop reason: HOSPADM

## 2022-05-25 RX ORDER — OXYCODONE AND ACETAMINOPHEN 7.5; 325 MG/1; MG/1
2 TABLET ORAL
Status: DISCONTINUED | OUTPATIENT
Start: 2022-05-25 | End: 2022-05-27 | Stop reason: HOSPADM

## 2022-05-25 RX ORDER — SODIUM CHLORIDE, SODIUM LACTATE, POTASSIUM CHLORIDE, CALCIUM CHLORIDE 600; 310; 30; 20 MG/100ML; MG/100ML; MG/100ML; MG/100ML
100 INJECTION, SOLUTION INTRAVENOUS CONTINUOUS
Status: DISCONTINUED | OUTPATIENT
Start: 2022-05-26 | End: 2022-05-25 | Stop reason: HOSPADM

## 2022-05-25 RX ORDER — OXYBUTYNIN CHLORIDE 5 MG/1
2.5 TABLET ORAL 2 TIMES DAILY
Status: DISCONTINUED | OUTPATIENT
Start: 2022-05-26 | End: 2022-05-27 | Stop reason: HOSPADM

## 2022-05-25 RX ORDER — HYDROMORPHONE HYDROCHLORIDE 2 MG/ML
INJECTION, SOLUTION INTRAMUSCULAR; INTRAVENOUS; SUBCUTANEOUS AS NEEDED
Status: DISCONTINUED | OUTPATIENT
Start: 2022-05-25 | End: 2022-05-25 | Stop reason: HOSPADM

## 2022-05-25 RX ORDER — ARIPIPRAZOLE 10 MG/1
10 TABLET ORAL
Status: DISCONTINUED | OUTPATIENT
Start: 2022-05-26 | End: 2022-05-27 | Stop reason: HOSPADM

## 2022-05-25 RX ORDER — SODIUM CHLORIDE 0.9 % (FLUSH) 0.9 %
5-40 SYRINGE (ML) INJECTION EVERY 8 HOURS
Status: DISCONTINUED | OUTPATIENT
Start: 2022-05-26 | End: 2022-05-25 | Stop reason: HOSPADM

## 2022-05-25 RX ORDER — SODIUM CHLORIDE 0.9 % (FLUSH) 0.9 %
5-40 SYRINGE (ML) INJECTION AS NEEDED
Status: DISCONTINUED | OUTPATIENT
Start: 2022-05-25 | End: 2022-05-25 | Stop reason: HOSPADM

## 2022-05-25 RX ORDER — LIDOCAINE HYDROCHLORIDE 20 MG/ML
INJECTION, SOLUTION EPIDURAL; INFILTRATION; INTRACAUDAL; PERINEURAL AS NEEDED
Status: DISCONTINUED | OUTPATIENT
Start: 2022-05-25 | End: 2022-05-25 | Stop reason: HOSPADM

## 2022-05-25 RX ORDER — DEXTROSE MONOHYDRATE 100 MG/ML
0-250 INJECTION, SOLUTION INTRAVENOUS AS NEEDED
Status: DISCONTINUED | OUTPATIENT
Start: 2022-05-25 | End: 2022-05-25 | Stop reason: HOSPADM

## 2022-05-25 RX ORDER — PROPOFOL 10 MG/ML
INJECTION, EMULSION INTRAVENOUS AS NEEDED
Status: DISCONTINUED | OUTPATIENT
Start: 2022-05-25 | End: 2022-05-25 | Stop reason: HOSPADM

## 2022-05-25 RX ORDER — FLUVOXAMINE MALEATE 50 MG/1
50 TABLET ORAL
Status: DISCONTINUED | OUTPATIENT
Start: 2022-05-26 | End: 2022-05-27 | Stop reason: HOSPADM

## 2022-05-25 RX ORDER — MAGNESIUM SULFATE 100 %
4 CRYSTALS MISCELLANEOUS AS NEEDED
Status: DISCONTINUED | OUTPATIENT
Start: 2022-05-25 | End: 2022-05-25 | Stop reason: HOSPADM

## 2022-05-25 RX ORDER — NALOXONE HYDROCHLORIDE 0.4 MG/ML
0.1 INJECTION, SOLUTION INTRAMUSCULAR; INTRAVENOUS; SUBCUTANEOUS AS NEEDED
Status: DISCONTINUED | OUTPATIENT
Start: 2022-05-25 | End: 2022-05-25 | Stop reason: HOSPADM

## 2022-05-25 RX ADMIN — CEFAZOLIN 2 G: 10 INJECTION, POWDER, FOR SOLUTION INTRAVENOUS; PARENTERAL at 21:17

## 2022-05-25 RX ADMIN — TRANEXAMIC ACID 1 G: 100 INJECTION, SOLUTION INTRAVENOUS at 21:19

## 2022-05-25 RX ADMIN — Medication 40 MG: at 22:25

## 2022-05-25 RX ADMIN — KETAMINE HYDROCHLORIDE 10 MG: 10 INJECTION, SOLUTION INTRAMUSCULAR; INTRAVENOUS at 21:32

## 2022-05-25 RX ADMIN — SODIUM CHLORIDE, SODIUM LACTATE, POTASSIUM CHLORIDE, AND CALCIUM CHLORIDE: 600; 310; 30; 20 INJECTION, SOLUTION INTRAVENOUS at 20:50

## 2022-05-25 RX ADMIN — KETAMINE HYDROCHLORIDE 10 MG: 10 INJECTION, SOLUTION INTRAMUSCULAR; INTRAVENOUS at 22:08

## 2022-05-25 RX ADMIN — FENTANYL CITRATE 25 MCG: 50 INJECTION, SOLUTION INTRAMUSCULAR; INTRAVENOUS at 23:42

## 2022-05-25 RX ADMIN — TRANEXAMIC ACID 1 G: 100 INJECTION, SOLUTION INTRAVENOUS at 22:37

## 2022-05-25 RX ADMIN — MIDAZOLAM 2 MG: 1 INJECTION INTRAMUSCULAR; INTRAVENOUS at 20:50

## 2022-05-25 RX ADMIN — HYDROMORPHONE HYDROCHLORIDE 0.25 MG: 2 INJECTION, SOLUTION INTRAMUSCULAR; INTRAVENOUS; SUBCUTANEOUS at 21:39

## 2022-05-25 RX ADMIN — HYDROMORPHONE HYDROCHLORIDE 0.5 MG: 1 INJECTION, SOLUTION INTRAMUSCULAR; INTRAVENOUS; SUBCUTANEOUS at 23:37

## 2022-05-25 RX ADMIN — KETAMINE HYDROCHLORIDE 20 MG: 10 INJECTION, SOLUTION INTRAMUSCULAR; INTRAVENOUS at 21:05

## 2022-05-25 RX ADMIN — HYDROMORPHONE HYDROCHLORIDE 0.25 MG: 2 INJECTION, SOLUTION INTRAMUSCULAR; INTRAVENOUS; SUBCUTANEOUS at 21:21

## 2022-05-25 RX ADMIN — HYDROMORPHONE HYDROCHLORIDE 0.25 MG: 2 INJECTION, SOLUTION INTRAMUSCULAR; INTRAVENOUS; SUBCUTANEOUS at 21:45

## 2022-05-25 RX ADMIN — HYDROMORPHONE HYDROCHLORIDE 0.25 MG: 2 INJECTION, SOLUTION INTRAMUSCULAR; INTRAVENOUS; SUBCUTANEOUS at 22:08

## 2022-05-25 RX ADMIN — PROPOFOL 150 MG: 10 INJECTION, EMULSION INTRAVENOUS at 20:57

## 2022-05-25 RX ADMIN — KETAMINE HYDROCHLORIDE 10 MG: 10 INJECTION, SOLUTION INTRAMUSCULAR; INTRAVENOUS at 21:59

## 2022-05-25 RX ADMIN — SODIUM CHLORIDE, SODIUM LACTATE, POTASSIUM CHLORIDE, AND CALCIUM CHLORIDE: 600; 310; 30; 20 INJECTION, SOLUTION INTRAVENOUS at 23:13

## 2022-05-25 RX ADMIN — LIDOCAINE HYDROCHLORIDE 60 MG: 20 INJECTION, SOLUTION EPIDURAL; INFILTRATION; INTRACAUDAL; PERINEURAL at 20:57

## 2022-05-25 RX ADMIN — ONDANSETRON HYDROCHLORIDE 4 MG: 2 INJECTION INTRAMUSCULAR; INTRAVENOUS at 22:37

## 2022-05-25 NOTE — ROUTINE PROCESS
Bedside and Verbal shift change report given to PATO Bolton rn (oncoming nurse) by Zonia Krishnan RN (offgoing nurse). Report included the following information SBAR, Kardex, MAR and Recent Results.

## 2022-05-25 NOTE — ROUTINE PROCESS
TRANSFER - IN REPORT:    Verbal report received from Janeth Ho(name) on Wojciech Leiva  being received from [place](unit) for Surgery at [facility]      Report consisted of patients Situation, Background, Assessment and   Recommendations(SBAR). Information from the following report(s) SBAR, Kardex, [facility] and Recent Results was reviewed with the receiving nurse. Opportunity for questions and clarification was provided. Assessment completed upon patients arrival to unit and care assumed.

## 2022-05-25 NOTE — PROGRESS NOTES
1735  Pt d/c from [facility] yesterday 5/24/2022. Now direct admit from [place] due to Dislocation of left hip. Nurse Jennie Morse states pt was hanging a picture at home heard a pop and fell. Dislocation left hip. Pt has been NPO since at least 2pm, since being at [place]. 1906  Pt still has not arrived to [facility] room 211. Report passed to queta gaona.  OR nurse aware as well pt has not arrived

## 2022-05-26 ENCOUNTER — APPOINTMENT (OUTPATIENT)
Dept: GENERAL RADIOLOGY | Age: 72
DRG: 468 | End: 2022-05-26
Attending: ORTHOPAEDIC SURGERY
Payer: MEDICARE

## 2022-05-26 LAB
ANION GAP SERPL CALC-SCNC: 2 MMOL/L (ref 3–18)
BUN SERPL-MCNC: 13 MG/DL (ref 7–18)
BUN/CREAT SERPL: 15 (ref 12–20)
CALCIUM SERPL-MCNC: 8.3 MG/DL (ref 8.5–10.1)
CHLORIDE SERPL-SCNC: 102 MMOL/L (ref 100–111)
CO2 SERPL-SCNC: 32 MMOL/L (ref 21–32)
CREAT SERPL-MCNC: 0.84 MG/DL (ref 0.6–1.3)
GLUCOSE BLD STRIP.AUTO-MCNC: 175 MG/DL (ref 70–110)
GLUCOSE SERPL-MCNC: 110 MG/DL (ref 74–99)
HCT VFR BLD AUTO: 23.1 % (ref 35–45)
HGB BLD-MCNC: 7.5 G/DL (ref 12–16)
INR PPP: 1.1 (ref 0.8–1.2)
POTASSIUM SERPL-SCNC: 3.9 MMOL/L (ref 3.5–5.5)
PROTHROMBIN TIME: 14.7 SEC (ref 11.5–15.2)
SODIUM SERPL-SCNC: 136 MMOL/L (ref 136–145)

## 2022-05-26 PROCEDURE — 74011250637 HC RX REV CODE- 250/637: Performed by: ORTHOPAEDIC SURGERY

## 2022-05-26 PROCEDURE — 65270000029 HC RM PRIVATE

## 2022-05-26 PROCEDURE — 85610 PROTHROMBIN TIME: CPT

## 2022-05-26 PROCEDURE — 77030027138 HC INCENT SPIROMETER -A

## 2022-05-26 PROCEDURE — 74011250636 HC RX REV CODE- 250/636: Performed by: ORTHOPAEDIC SURGERY

## 2022-05-26 PROCEDURE — 80048 BASIC METABOLIC PNL TOTAL CA: CPT

## 2022-05-26 PROCEDURE — 74011000250 HC RX REV CODE- 250: Performed by: ORTHOPAEDIC SURGERY

## 2022-05-26 PROCEDURE — 97162 PT EVAL MOD COMPLEX 30 MIN: CPT

## 2022-05-26 PROCEDURE — 73501 X-RAY EXAM HIP UNI 1 VIEW: CPT

## 2022-05-26 PROCEDURE — 36415 COLL VENOUS BLD VENIPUNCTURE: CPT

## 2022-05-26 PROCEDURE — 85018 HEMOGLOBIN: CPT

## 2022-05-26 PROCEDURE — 82962 GLUCOSE BLOOD TEST: CPT

## 2022-05-26 RX ORDER — CEFAZOLIN SODIUM/WATER 2 G/20 ML
2 SYRINGE (ML) INTRAVENOUS EVERY 8 HOURS
Status: COMPLETED | OUTPATIENT
Start: 2022-05-26 | End: 2022-05-27

## 2022-05-26 RX ADMIN — ARIPIPRAZOLE 10 MG: 10 TABLET ORAL at 21:21

## 2022-05-26 RX ADMIN — PANTOPRAZOLE SODIUM 40 MG: 40 TABLET, DELAYED RELEASE ORAL at 07:02

## 2022-05-26 RX ADMIN — ASPIRIN 81 MG: 81 TABLET, COATED ORAL at 21:21

## 2022-05-26 RX ADMIN — ARIPIPRAZOLE 10 MG: 10 TABLET ORAL at 01:10

## 2022-05-26 RX ADMIN — CEFAZOLIN 2 G: 10 INJECTION, POWDER, FOR SOLUTION INTRAVENOUS at 05:44

## 2022-05-26 RX ADMIN — SODIUM CHLORIDE, SODIUM LACTATE, POTASSIUM CHLORIDE, AND CALCIUM CHLORIDE 75 ML/HR: 600; 310; 30; 20 INJECTION, SOLUTION INTRAVENOUS at 00:06

## 2022-05-26 RX ADMIN — HYDROCODONE BITARTRATE AND ACETAMINOPHEN 2 TABLET: 5; 325 TABLET ORAL at 07:42

## 2022-05-26 RX ADMIN — OXYBUTYNIN CHLORIDE 2.5 MG: 5 TABLET ORAL at 01:10

## 2022-05-26 RX ADMIN — HYDROCODONE BITARTRATE AND ACETAMINOPHEN 1 TABLET: 5; 325 TABLET ORAL at 12:58

## 2022-05-26 RX ADMIN — TRIAMTERENE AND HYDROCHLOROTHIAZIDE 1 TABLET: 37.5; 25 TABLET ORAL at 08:43

## 2022-05-26 RX ADMIN — HYDROCODONE BITARTRATE AND ACETAMINOPHEN 2 TABLET: 5; 325 TABLET ORAL at 00:06

## 2022-05-26 RX ADMIN — CLONAZEPAM 1 MG: 0.5 TABLET ORAL at 01:10

## 2022-05-26 RX ADMIN — CEFAZOLIN 2 G: 10 INJECTION, POWDER, FOR SOLUTION INTRAVENOUS at 12:01

## 2022-05-26 RX ADMIN — ASPIRIN 81 MG: 81 TABLET, COATED ORAL at 01:11

## 2022-05-26 RX ADMIN — ESCITALOPRAM OXALATE 20 MG: 10 TABLET ORAL at 08:43

## 2022-05-26 RX ADMIN — CLONAZEPAM 1 MG: 0.5 TABLET ORAL at 21:21

## 2022-05-26 RX ADMIN — AMITRIPTYLINE HYDROCHLORIDE 100 MG: 50 TABLET, FILM COATED ORAL at 01:10

## 2022-05-26 RX ADMIN — AMITRIPTYLINE HYDROCHLORIDE 100 MG: 50 TABLET, FILM COATED ORAL at 21:21

## 2022-05-26 RX ADMIN — FLUVOXAMINE MALEATE 50 MG: 50 TABLET, COATED ORAL at 21:53

## 2022-05-26 RX ADMIN — OXYBUTYNIN CHLORIDE 2.5 MG: 5 TABLET ORAL at 08:43

## 2022-05-26 RX ADMIN — OXYBUTYNIN CHLORIDE 2.5 MG: 5 TABLET ORAL at 21:21

## 2022-05-26 RX ADMIN — ASPIRIN 81 MG: 81 TABLET, COATED ORAL at 08:43

## 2022-05-26 RX ADMIN — FLUVOXAMINE MALEATE 50 MG: 50 TABLET, COATED ORAL at 01:10

## 2022-05-26 RX ADMIN — CEFAZOLIN 2 G: 10 INJECTION, POWDER, FOR SOLUTION INTRAVENOUS at 21:20

## 2022-05-26 NOTE — PROGRESS NOTES
Problem: Falls - Risk of  Goal: *Absence of Falls  Description: Document [place] Fall Risk and appropriate interventions in the flowsheet. Outcome: Progressing Towards Goal  Note: Fall Risk Interventions:  Mobility Interventions: Communicate number of staff needed for ambulation/transfer,Patient to call before getting OOB,Utilize walker, cane, or other assistive device         Medication Interventions: Teach patient to arise slowly    Elimination Interventions: Call light in reach,Patient to call for help with toileting needs,Toileting schedule/hourly rounds,Stay With Me (per policy)    History of Falls Interventions:  Investigate reason for fall         Problem: Pain  Goal: *Control of Pain  Outcome: Progressing Towards Goal

## 2022-05-26 NOTE — ROUTINE PROCESS
Bedside and Verbal shift change report given to [employer] (oncoming nurse) by Chen Delgado RN (offgoing nurse). Report included the following information SBAR, Kardex, MAR and Recent Results.

## 2022-05-26 NOTE — H&P
[address] [facility] [address] Medicine  History and Physical Exam    Patient: Hawa Arevalo MRN: [id number]  SSN: [id number]    YOB: 1950  Age: 67 y.o. Sex: female      Subjective:      Chief Complaint: left hip pain    History of Present Illness:  Two days s/p laura. Was standing at home turned to the left felt a pop and fell down. Seen in the ER at [facility] and Dx's with a dislocated LAURA. Past Medical History:   Diagnosis Date    Arthritis     osteo    Difficult intubation     esophagus needs to be stretch every 2 years, had done 03/2022    GERD (gastroesophageal reflux disease)     relux, and hiatal hernia    Hypertension     Osteoarthritis of right knee 2/1/2013    Other ill-defined conditions(799.89)     hiatal hernia    Psychiatric disorder     depression    S/P total knee arthroplasty 02/04/2013     Past Surgical History:   Procedure Laterality Date    HX CATARACT REMOVAL      left eye    HX CHOLECYSTECTOMY      laparoscopic    HX COLONOSCOPY      had 2 done, f/u in 10 years    HX HEENT  04/2022    repair detached retena    HX HYSTERECTOMY      TVH    HX KNEE ARTHROSCOPY      right knee replacement    HX OOPHORECTOMY      BSO    HX ORTHOPAEDIC      manipulation of left shoulder under anesthesia     Social History     Occupational History    Not on file   Tobacco Use    Smoking status: Never Smoker    Smokeless tobacco: Never Used   Substance and Sexual Activity    Alcohol use: No    Drug use: Never    Sexual activity: Not on file     Prior to Admission medications    Medication Sig Start Date End Date Taking? Authorizing Provider   cephALEXin (KEFLEX) 500 mg capsule Take 1 Capsule by mouth four (4) times daily for 2 days. 5/23/22 5/25/22  Omega Flowers PA-C   aspirin delayed-release 81 mg tablet Take 1 Tablet by mouth two (2) times a day.  5/23/22   Omega Flowers PA-C   oxyCODONE-acetaminophen (PERCOCET) 5-325 mg per tablet Take 1-2 Tablets by mouth every four (4) hours as needed for Pain for up to 3 days. Max Daily Amount: 12 Tablets. 5/23/22 5/26/22  Dalila Diaz PA-C   naloxone [facility]) 4 mg/actuation nasal spray Use 1 spray intranasally, then discard. Repeat with new spray every 2 min as needed for opioid overdose symptoms, alternating nostrils. 5/23/22   Dalila Diaz PA-C   flavoxate HCl (FLAVOXATE PO) Take  by mouth. Provider, Historical   fluvoxaMINE (LUVOX) 50 mg tablet Take 50 mg by mouth nightly. Indications: depression    Provider, Historical   linaCLOtide (Linzess) 145 mcg cap capsule Take 145 mg by mouth. Indications: IBS    Provider, Historical   clonazePAM (KlonoPIN) 1 mg tablet Take 1 mg by mouth two (2) times a day. Provider, Historical   triamterene-hydroCHLOROthiazide (DYAZIDE) 37.5-25 mg per capsule Take 1 Capsule by mouth daily. Provider, Historical   Dexlansoprazole (DEXILANT) 60 mg CpDM Take 1 Cap by mouth daily. Pt instructed to take with a small bit of water on DOS    Provider, Historical   escitalopram (LEXAPRO) 20 mg tablet Take 20 mg by mouth daily. Provider, Historical   ARIPiprazole (ABILIFY) 2 mg tablet Take 10 mg by mouth nightly. Indications: depression    Provider, Historical   amitriptyline (ELAVIL) 100 mg tablet Take 100 mg by mouth two (2) times a day. Indications: helps with sleep    Provider, Historical       Allergies: Allergies   Allergen Reactions    Neosporin [Benzalkonium Chloride] Other (comments)     Soreness and redness    Other Medication Shortness of Breath and Swelling     Yellow Jacket bee stings        Review of Systems:  A comprehensive review of systems was negative except for that written in the History of Present Illness.     Objective:       Physical Exam:  HEENT: Normocephalic, atraumatic  Lungs:  Clear to auscultation  Heart:   Regular rate and rhythm  Abdomen: Soft  Extremities:  Left leg shortened and slightly internally rotated as compared to the right  Neurological: Grossly neurovascularly intact    Assessment:      Dislocated LAURA. Plan:       The patient has had a dislocation with a relatively minor twist. I feel she will likely redislocate if only closed reduction is done. Will plan to increase femoral head size to 56 which will increase the MDM to 46, lengthen the stem, and increase the anteversion of the cup. I am not planning a constrained cup at this time. Proceed with scheduled LAURA revision. The various methods of treatment have been discussed with the patient and family. After consideration of risks, benefits, and other options for treatment, the patient has consented to surgical interventions. Questions were answered and preoperative teaching was done by me. Pt prefers revision to simple closed reduction.      Signed By: Kofi Giordano MD     May 25, 2022

## 2022-05-26 NOTE — PROGRESS NOTES
Progress Note     Patient: Jerry Magallon MRN: [id number]  SSN: [id number]    YOB: 1950  Age: 67 y.o. Sex: female      POD:    1 Day Post-Op  S/P:    Procedure(s):  Revision Left Total Hip     Subjective:   Pt is without complaints of pain. Objective:     Patient Vitals for the past 12 hrs:   BP Temp Pulse Resp SpO2   05/26/22 1056 (!) 114/44 97.9 °F (36.6 °C) 85 15 98 %   05/26/22 0940 (!) 124/51 97.7 °F (36.5 °C) 90 19 99 %   05/26/22 0741 110/69 98 °F (36.7 °C) 84 16 95 %   05/26/22 0318 (!) 122/52 97.4 °F (36.3 °C) 78 16 100 %   05/26/22 0209 (!) 116/52 98.2 °F (36.8 °C) 78 16 100 %   05/26/22 0108 (!) 105/48 98.1 °F (36.7 °C) 79 16 100 %     Recent Labs     05/26/22  0450   HGB 7.5*   HCT 23.1*   INR 1.1      K 3.9      CO2 32   BUN 13   CREA 0.84   *       Pt resting in bed. Lower extremity operative dressing clean/dry/intact. Neurovascularly intact. Assessment:     Awake and alert. In good spirits. X rays satisfactory. Probable discharge today if passes PT. Some dizziness earlier but none now. If dizziness returns will transfuse. Plan:   1. Continue pain management/ ice to operative area. 2. Continue to progress with PT/OT. 3. Discharge planning to home with home health.

## 2022-05-26 NOTE — PROGRESS NOTES
Progress Note     Patient: Vipin Benitez MRN: [id number]  SSN: [id number]    YOB: 1950  Age: 67 y.o. Sex: female      POD:    1 Day Post-Op  S/P:    Procedure(s):  Revision Left Total Hip     Subjective:   Pt is without complaints of pain. Objective:     Patient Vitals for the past 12 hrs:   BP Temp Pulse Resp SpO2   05/25/22 2356 133/61 97.4 °F (36.3 °C) 89 16 95 %   05/25/22 2346 133/60 -- 86 15 97 %   05/25/22 2341 (!) 126/58 -- 88 16 97 %   05/25/22 2337 (!) 138/54 -- 87 11 100 %   05/25/22 2332 (!) 131/53 97 °F (36.1 °C) 90 16 99 %   05/25/22 1944 131/61 98.2 °F (36.8 °C) 89 17 100 %     Recent Labs     05/25/22 2040 05/23/22  0545 05/23/22  0545   HGB 8.5*   < > 8.7*   HCT 25.5*   < > 26.6*   NA  --   --  136   K  --   --  4.2   CL  --   --  102   CO2  --   --  31   BUN  --   --  17   CREA  --   --  1.30   GLU  --   --  129*    < > = values in this interval not displayed. Pt resting in bed. Lower extremity operative dressing clean/dry/intact. Neurovascularly intact. Assessment:     Awake and alert. In good spirits. Probable dc today. Plan:   1. Continue pain management/ ice to operative area. 2. Continue to progress with PT/OT. 3. Discharge planning to home with home health.

## 2022-05-26 NOTE — PERIOP NOTES
TRANSFER - OUT REPORT:    Verbal report given to Mj Elliott RN(name) on Vipin Benitez  being transferred to [address] [place] [address](unit) for routine post - op       Report consisted of patients Situation, Background, Assessment and   Recommendations(SBAR). Information from the following report(s) SBAR, ED Summary, OR Summary, Procedure Summary, Intake/Output, MAR, Recent Results and Med Rec Status was reviewed with the receiving nurse. Lines:   Peripheral IV Anterior;Right Hand (Active)   Site Assessment Clean, dry, & intact 05/25/22 2340   Phlebitis Assessment 0 05/25/22 2340   Infiltration Assessment 0 05/25/22 2340   Dressing Status Clean, dry, & intact 05/25/22 2340   Dressing Type Transparent;Tape 05/25/22 2340        Opportunity for questions and clarification was provided.       Patient transported with:   O2 @ 2 liters  Registered Nurse

## 2022-05-26 NOTE — NURSE NAVIGATOR
Jerry Magallon rounded on post anterior hip revision. Discussed the following during rounding: Activity:  OOB for all meals. Promoting Circulation  Ankle pumps 10 times an hour at hospital & home. Make sure to walk short distances every hour while awake to help with muscle tightness, aching and soreness. Follow the exercises and mobility instructions provided by Physical Therapy. Change positions every hour to help ease stiffness and soreness. Pain Control:  Pain medications side effects discussed. Use ice, distraction, moving, & change position to help with pain. Rest between activity. Reminded narcotics and anesthesia cause constipation so need to take stool softener/mild laxative daily while on narcotics. Reviewed how to safely elevate the leg after exercises for 30 minutes and before bed to decrease swelling. Incentive Spirometry:    Use of incentive spirometer 10 x/hr  Diet:   Eat for healing. Drink enough fluids so urine is lemonade in color. Currently Miranda in place. Reminded medication can cause nausea if taken on an empty stomach so need to eat before taking them. Patient Safety:   Call light & belongings in reach. Call for help when want to walk or get OOB.      Belongings in reach, call light in reach, setup to eat breakfast.     Nurse Navigator

## 2022-05-26 NOTE — PROGRESS NOTES
1900 -  Assumed care at this time. 1924 - Pt A&Ox4, 2L NC. Denies chest pain/SOB. Lung sounds clear. Mepilex to left hip C/D/I. Pain rated 1/10. SCD bilaterally. Pt educated on IS use, pain mgmt, q2h rounds, and CHG wipes. Pt verbalized understanding. Telephone and call bell within reach, bed in lowest position. Pt encouraged to call for assistance.

## 2022-05-26 NOTE — ROUTINE PROCESS
Bedside and Verbal shift change report given to BRIAN Roman RN by Carlota Del Rio RN. Report included the following information SBAR, Kardex, Intake/Output and MAR.

## 2022-05-26 NOTE — PROGRESS NOTES
Transition of Care (BRYNN) Plan:     Pt plans to return back home and resume services with [facility] services along with family support. Pt recently had  outpatient  L Hip procedure at [facility] a few days ago,while at home pt turned and felt a pop dislocating L hip. Pt Has home rolling walker  And family support, cm will cont to review and remain available for possible adjustments to d/c plan, PT and OT eval has been ordered. BRYNN Transportation:   How is patient being transported at discharge? Family/Friend      When? Once discharge criteria met     Is transport scheduled? N/A      Follow-up appointment and transportation:   PCP/Specialist?  See AVS for Appointment         Who is transporting to the follow-up appointment? Family/Friend      Is transport for follow up appointment scheduled? N/A    Communication plan (with patient/family): Who is being called? Patient or Next of Kin? Responsible party? Patient      What number(s) is to be used? See Facesheet      What service provider is calling for [facility] services? When are they calling? 24-48 hours following discharge    Readmission Risk? (Green/Low; Yellow/Moderate; Red/High):  Green  Care Management Interventions  PCP Verified by CM: Yes  Palliative Care Criteria Met (RRAT>21 & CHF Dx)?: No  Mode of Transport at Discharge:  Other (see comment)  Physical Therapy Consult: Yes  Occupational Therapy Consult: Yes  Support Systems: Spouse/Significant Other  Confirm Follow Up Transport: Family  The Plan for Transition of Care is Related to the Following Treatment Goals : home with family assist  The Patient and/or Patient Representative was Provided with a Choice of Provider and Agrees with the Discharge Plan?: Yes  Freedom of Choice List was Provided with Basic Dialogue that Supports the Patient's Individualized Plan of Care/Goals, Treatment Preferences and Shares the Quality Data Associated with the Providers?: Yes  Discharge Location  Patient Expects to be Discharged to[de-identified] Home with home health

## 2022-05-26 NOTE — PROGRESS NOTES
Problem: Mobility Impaired (Adult and Pediatric)  Goal: *Acute Goals and Plan of Care (Insert Text)  Description: Physical Therapy Goals  Initiated 5/26/2022  to be met within 2-3 days  STG's:  1. Bed mobility:  Supine to/from sit with S in prep for ADL activity. 2. Transfers:  Sit to/from stand with S/SBA/RW in prep for gait. LTG's  1. Ambulation:  Ambulate 100 ft.with S/SBA/RW for home mobility at discharge. 2. Step Negotiation: Ascend/Descend 3-5 steps with CGA with HR for home navigation as indicated. Outcome: Progressing Towards Goal    []  Patient has met MD mobilization karey for d/c home   []  Recommend HH with 24 hour adult care   [x]  Benefit from additional acute PT session to address:  Functional mobility and ROM/motor performance deficits     PHYSICAL THERAPY EVALUATION    Patient: Rj Prince (36 y.o. female)  Date: 5/26/2022  Primary Diagnosis: Status post revision of total hip replacement [Z96.649]  Procedure(s) (LRB):  Revision Left Total Hip (Left) 1 Day Post-Op   Precautions:  Fall  WBAT  PLOF: amb with RW since recent LAURA on 5/23/2022. ASSESSMENT :  Based on the objective data described below, the patient presents s/p revision L LAURA due to fall and dislocation 2 days post surgery \"Was standing at home turned to the left felt a pop and fell down\"). Pt presents with decreased independence in functional mobility r/t decreased ROM/motor performance L LE, decreased balance and decr'd activity tolerance. Pt found seated EOB in OT session. Reports 0/10 pain at rest, increased to 8/10 during GT and decr'd to 3/10 post session. Performs transfers STS with RW/Natalia/CGA and vc for hand/L foot placement/safety. Able to participate in GT/RW/min/CGA  ~32 ft. Faye slow, antalgic with step to gt pattern. Pt w/o c/o dizziness or light headedness. Returned to sit EOB and left in NAD withall needs in reach and nurse Sepideh notified. Will continue IPPT to address goals above.   Recommend HHPT for follow up physical therapy upon discharge to reach maximal level of independence/safety with functional mobility. Patient education: Mobility safety, WB, HEP, ice application/use, elevation, environmental safety and home safety techniques. Patient will benefit from skilled intervention to address the above impairments. Patient's rehabilitation potential is considered to be Good  Factors which may influence rehabilitation potential include:   []         None noted  []         Mental ability/status  []         Medical condition  []         Home/family situation and support systems  [x]         Safety awareness  [x]         Pain tolerance/management  []         Other:      PLAN :  Recommendations and Planned Interventions:   [x]           Bed Mobility Training             []    Neuromuscular Re-Education  [x]           Transfer Training                   []    Orthotic/Prosthetic Training  [x]           Gait Training                          []    Modalities  [x]           Therapeutic Exercises           []    Edema Management/Control  [x]           Therapeutic Activities            []    Family Training/Education  [x]           Patient Education  []           Other (comment):    Frequency/Duration: Patient will be followed by physical therapy 1-2 times per day/4-7 days per week to address goals. Discharge Recommendations: Home Physical Therapy  Further Equipment Recommendations for Discharge: N/A     SUBJECTIVE:   Patient stated I'm not dizzy now.     OBJECTIVE DATA SUMMARY:     Past Medical History:   Diagnosis Date    Arthritis     osteo    Difficult intubation     esophagus needs to be stretch every 2 years, had done 03/2022    GERD (gastroesophageal reflux disease)     relux, and hiatal hernia    Hypertension     Osteoarthritis of right knee 2/1/2013    Other ill-defined conditions(799.89)     hiatal hernia    Psychiatric disorder     depression    S/P total knee arthroplasty 02/04/2013     Past Surgical History:   Procedure Laterality Date    HX CATARACT REMOVAL      left eye    HX CHOLECYSTECTOMY      laparoscopic    HX COLONOSCOPY      had 2 done, f/u in 10 years    HX HEENT  04/2022    repair detached retena    HX HYSTERECTOMY      TVH    HX KNEE ARTHROSCOPY      right knee replacement    HX OOPHORECTOMY      BSO    HX ORTHOPAEDIC      manipulation of left shoulder under anesthesia     Barriers to Learning/Limitations: yes;  physical  Compensate with: Visual Cues, Verbal Cues, and Tactile Cues  Home Situation:  Home Situation  Home Environment: Private residence  # Steps to Enter: 3  Rails to Enter: Yes  Hand Rails : Bilateral  One/Two Story Residence: One story  Living Alone: No  Support Systems: Spouse/Significant Other  Patient Expects to be Discharged to[de-identified] Home with home health  Current DME Used/Available at Home: Uri Dienes, straight,Walker, rolling,Shower chair,Raised toilet seat  Tub or Shower Type: Shower (with seat)  Critical Behavior:  Neurologic State: Alert; Appropriate for age  Orientation Level: Oriented X4  Cognition: Follows commands  Safety/Judgement: Awareness of environment; Fall prevention  Psychosocial  Patient Behaviors: Calm; Cooperative  Family  Behaviors: Calm;Supportive  Skin Condition/Temp: Dry;Warm  Family  Behaviors: Calm;Supportive  Skin Integrity: Incision (comment)  Skin Integumentary  Skin Color: Appropriate for ethnicity  Skin Condition/Temp: Dry;Warm  Skin Integrity: Incision (comment)  Strength:    Strength: Generally decreased, functional (s/p THR revision)  Tone & Sensation:   Sensation: Intact  Range Of Motion:  AROM: Generally decreased, functional (s/p THR revision)  Functional Mobility:  Bed Mobility:  Scooting: Contact guard assistance  Transfers:  Sit to Stand: Minimum assistance;Contact guard assistance (bed ht elevatd, vc)  Stand to Sit: Minimum assistance;Contact guard assistance (vc for hand and L foot placement)  Balance:   Sitting: Intact  Standing: Impaired; With support  Standing - Static: Fair  Standing - Dynamic : Fair  Ambulation/Gait Training:  Distance (ft): 32 Feet (ft)  Assistive Device: Gait belt;Walker, rolling  Ambulation - Level of Assistance: Minimal assistance;Contact guard assistance; Additional time  Gait Description (WDL): Exceptions to WDL  Gait Abnormalities: Antalgic;Decreased step clearance; Step to gait  Left Side Weight Bearing: As tolerated  Speed/Faye: Slow  Step Length: Right shortened;Left shortened  Interventions: Safety awareness training; Tactile cues; Verbal cues; Visual/Demos  Pain:  Pain level pre-treatment: 0/10, increased to 8/10 during GT   Pain level post-treatment: 3/10   Pain Intervention(s) : Medication (see MAR); Rest, Ice, Repositioning  Response to intervention: Nurse notified, See doc flow  Activity Tolerance:   Fair   Please refer to the flowsheet for vital signs taken during this treatment. After treatment:   [x]         Patient left in no apparent distress sitting up EOB  []         Patient left in no apparent distress in bed  [x]         Call bell left within reach  [x]         Nursing notified  [x]         Caregiver present  []         Bed alarm activated  []         SCDs applied    COMMUNICATION/EDUCATION:   [x]         Role of Physical Therapy in the acute care setting. [x]         Fall prevention education was provided and the patient/caregiver indicated understanding. [x]         Patient/family have participated as able in goal setting and plan of care. [x]         Patient/family agree to work toward stated goals and plan of care. []         Patient understands intent and goals of therapy, but is neutral about his/her participation. []         Patient is unable to participate in goal setting/plan of care: ongoing with therapy staff.  []         Other:     Thank you for this referral.  Kati Alberts, PT   Time Calculation: 15 mins      Eval Complexity: History: HIGH Complexity :3+ comorbidities / personal factors will impact the outcome/ POC Exam:MEDIUM Complexity : 3 Standardized tests and measures addressing body structure, function, activity limitation and / or participation in recreation  Presentation: MEDIUM Complexity : Evolving with changing characteristics  Clinical Decision Making:Medium Complexity    Overall Complexity:MEDIUM

## 2022-05-26 NOTE — ANESTHESIA POSTPROCEDURE EVALUATION
Post-Anesthesia Evaluation and Assessment    Cardiovascular Function/Vital Signs  Visit Vitals  BP (!) 126/58   Pulse 88   Temp 36.1 °C (97 °F)   Resp 16   SpO2 97%       Patient is status post Procedure(s) with comments:  Revision Left Total Hip - db mills table Patient post surgery  5-. Nausea/Vomiting: Controlled. Postoperative hydration reviewed and adequate. Pain:  Pain Scale 1: Numeric (0 - 10) (05/25/22 2340)  Pain Intensity 1: 4 (05/25/22 2340)   Managed. Neurological Status:   Neuro (WDL): Exceptions to WDL (05/25/22 2340)   At baseline. Mental Status and Level of Consciousness: Arousable. Pulmonary Status:   O2 Device: Nasal cannula (05/25/22 8403)   Adequate oxygenation and airway patent. Complications related to anesthesia: None    Post-anesthesia assessment completed. No concerns. Patient has met all discharge requirements.     Signed By: Karina Anand CRNA    May 25, 2022

## 2022-05-26 NOTE — PROGRESS NOTES
conducted an initial consultation and Spiritual Assessment for Lucas Dacosta, who is a 67 y.o.,female. The reason the Patient came to the hospital is:   Patient Active Problem List    Diagnosis Date Noted    Status post revision of total hip replacement 05/25/2022    Status post left hip replacement 05/23/2022    S/P total knee arthroplasty 02/04/2013        The  provided the following Interventions:  Initiated a relationship of care and support. Explored issues of cristi, spirituality and/or Episcopal needs while hospitalized. Listened empathically. Provided chaplaincy education. Provided information about Spiritual Care Services. Offered assurance of continued prayers on patient's behalf. Chart reviewed. The following outcomes were achieved:  Patient shared some information about their medical narrative and spiritual journey/beliefs. Patient processed feeling about current hospitalization. Patient expressed gratitude for the 's visit. Assessment:  Patient did not indicate any spiritual or Episcopal issues which require Spiritual Care Services interventions at this time. Patient does not have any Episcopal/cultural needs that will affect patients preferences in health care. Plan:  Chaplains will continue to follow and will provide pastoral care on an as needed or requested basis.  recommends bedside caregivers page  on duty if patient shows signs of acute spiritual or emotional distress.       Sister Garnett Cranker, [place], [address]  [phone]

## 2022-05-26 NOTE — PROGRESS NOTES
0535  Assumed care of pt at this time. Assessment complete. Pt alert and oriented x 4. Shows no sign of distress. Fall risk arm band in place. Denies SOB and chest pain. Pt lungs clear bilaterally. Cap refill  less than 3 seconds. Pt denies numbness and tingling to all extremities. Stated pain 7/10. Pt has 20 G IV to R hand. Pt has optifoam mepilex dressing to left hip CDI. scds and TEDs in place to BLE. Incentive spirometer at bedside can reach 2000. Pt encouraged to continue use of IS. Pt verbalized understanding. Ice pack applied. Call light and possessions within reach. Bed locked and in low position. Will continue to monitor. 0800  Pt ambulated from bed to chair no complications to sit up for breakfast    0940  Nurse informed that pt felt lightheaded and dizzy sitting up in chair felt as if she wanted to pass out. Nurse observed pt in bed and speaking with another nurse /51   97.7 oral  90 HR  99%    0951      1302  Dr Mendoza Class in to see pt made aware that pt got light headed and dizzy and returned to bed. PT will see pt later today. If pt stays over night genao can be removed tomorrow and continue ancef for x2 doses    1545  Pt ambulating to doorway with PT no complaints of dizziness but PT would like to reassess her mobility tomorrow morning. Shift summary  Pt is alert and oriented x 4. Pt had uneventful shift. Pt ambulating in room with assitance and voiding sufficient amounts via genao. Pain controlled by PRN medication. Plan for genao to be d/c tomorrow morning if pt can void on her own no need to follow up with Dr Jodie Hubbard outpatient for urinary retention  last admission 5/23/22. Pt will see PT if cleared will d/c home with home health.

## 2022-05-26 NOTE — ANESTHESIA PREPROCEDURE EVALUATION
Relevant Problems   No relevant active problems       Anesthetic History     Increased risk of difficult airway     Comments: Esophageal stricture     Review of Systems / Medical History  Patient summary reviewed, nursing notes reviewed and pertinent labs reviewed    Pulmonary  Within defined limits                 Neuro/Psych         Psychiatric history (depression)     Cardiovascular    Hypertension                   GI/Hepatic/Renal     GERD      Hiatal hernia     Endo/Other        Arthritis     Other Findings              Physical Exam    Airway  Mallampati: II  TM Distance: 4 - 6 cm  Neck ROM: normal range of motion   Mouth opening: Normal     Cardiovascular    Rhythm: regular  Rate: normal         Dental    Dentition: Full lower dentures and Lower partial plate     Pulmonary  Breath sounds clear to auscultation               Abdominal  GI exam deferred       Other Findings            Anesthetic Plan    ASA: 2, emergent  Anesthesia type: general          Induction: Intravenous  Anesthetic plan and risks discussed with: Patient

## 2022-05-26 NOTE — PROGRESS NOTES
1902 - Assumed care at this time. 1944 - Pt arrived to the unit at this time. Assessment completed. Pt A&Ox4,RA. Lung sounds clear. Denies chest pain/SOB. LLE shortened. Mepilex dressing C/D/I. Pt rated pain 2/10. Miranda draining and patent. CHG wipes and Nozin completed in preparation for surgery. OR team arrived to take pt down to surgery.  in room. 2356 - Pt arrived back to unit at this time. Pt A&Ox4, 2L NC. Denies chest pain/SOB. Lung sounds clear. Mepilex to left hip C/D/I. Pain rated 6/10, pain medication administered per MAR. Pt educated on IS use, pain mgmt, q2h rounds, and CHG wipes. Pt verbalized understanding. Telephone and call bell within reach, bed in lowest position. Pt encouraged to call for assistance. 46 - Verbal orders received from Dr. Grabiel Trevino for full weight bearing status. 6166 - CHG wipes completed.

## 2022-05-26 NOTE — ROUTINE PROCESS
TRANSFER - IN REPORT:    Verbal report received from [facility] Broderick RN(name) on Emilia Gaxiola  being received from PACU for routine post - op. Report consisted of patients Situation, Background, Assessment and   Recommendations(SBAR). Information from the following report(s) SBAR, Kardex, OR Summary, Intake/Output and MAR was reviewed with the receiving nurse. Opportunity for questions and clarification was provided. Assessment to be completed upon patients arrival to unit and care assumed.

## 2022-05-26 NOTE — PERIOP NOTES
18 [facility] made aware that SBAR is ready for review. Patient assigned room #211.  Ya Richards., RN will be the nurse

## 2022-05-27 VITALS
HEART RATE: 92 BPM | RESPIRATION RATE: 20 BRPM | TEMPERATURE: 98.4 F | DIASTOLIC BLOOD PRESSURE: 54 MMHG | SYSTOLIC BLOOD PRESSURE: 123 MMHG | OXYGEN SATURATION: 100 %

## 2022-05-27 LAB
HCT VFR BLD AUTO: 21.5 % (ref 35–45)
HGB BLD-MCNC: 7.1 G/DL (ref 12–16)
INR PPP: 1 (ref 0.8–1.2)
PROTHROMBIN TIME: 13.9 SEC (ref 11.5–15.2)

## 2022-05-27 PROCEDURE — 0SPB0JZ REMOVAL OF SYNTHETIC SUBSTITUTE FROM LEFT HIP JOINT, OPEN APPROACH: ICD-10-PCS | Performed by: ORTHOPAEDIC SURGERY

## 2022-05-27 PROCEDURE — 36415 COLL VENOUS BLD VENIPUNCTURE: CPT

## 2022-05-27 PROCEDURE — 97116 GAIT TRAINING THERAPY: CPT

## 2022-05-27 PROCEDURE — 74011000250 HC RX REV CODE- 250: Performed by: ORTHOPAEDIC SURGERY

## 2022-05-27 PROCEDURE — 85610 PROTHROMBIN TIME: CPT

## 2022-05-27 PROCEDURE — 85018 HEMOGLOBIN: CPT

## 2022-05-27 PROCEDURE — 74011250637 HC RX REV CODE- 250/637: Performed by: ORTHOPAEDIC SURGERY

## 2022-05-27 PROCEDURE — 74011250636 HC RX REV CODE- 250/636: Performed by: ORTHOPAEDIC SURGERY

## 2022-05-27 PROCEDURE — 0SRB0JZ REPLACEMENT OF LEFT HIP JOINT WITH SYNTHETIC SUBSTITUTE, OPEN APPROACH: ICD-10-PCS | Performed by: ORTHOPAEDIC SURGERY

## 2022-05-27 RX ORDER — CEFAZOLIN SODIUM/WATER 2 G/20 ML
2 SYRINGE (ML) INTRAVENOUS EVERY 8 HOURS
Status: DISCONTINUED | OUTPATIENT
Start: 2022-05-27 | End: 2022-05-27 | Stop reason: HOSPADM

## 2022-05-27 RX ADMIN — OXYBUTYNIN CHLORIDE 2.5 MG: 5 TABLET ORAL at 08:45

## 2022-05-27 RX ADMIN — CEFAZOLIN 2 G: 10 INJECTION, POWDER, FOR SOLUTION INTRAVENOUS at 04:21

## 2022-05-27 RX ADMIN — CEFAZOLIN 2 G: 10 INJECTION, POWDER, FOR SOLUTION INTRAVENOUS at 08:55

## 2022-05-27 RX ADMIN — SODIUM CHLORIDE, PRESERVATIVE FREE 10 ML: 5 INJECTION INTRAVENOUS at 05:06

## 2022-05-27 RX ADMIN — ASPIRIN 81 MG: 81 TABLET, COATED ORAL at 08:46

## 2022-05-27 RX ADMIN — CLONAZEPAM 1 MG: 0.5 TABLET ORAL at 08:46

## 2022-05-27 RX ADMIN — TRIAMTERENE AND HYDROCHLOROTHIAZIDE 1 TABLET: 37.5; 25 TABLET ORAL at 08:46

## 2022-05-27 RX ADMIN — PANTOPRAZOLE SODIUM 40 MG: 40 TABLET, DELAYED RELEASE ORAL at 07:09

## 2022-05-27 RX ADMIN — ESCITALOPRAM OXALATE 20 MG: 10 TABLET ORAL at 08:45

## 2022-05-27 RX ADMIN — OXYCODONE AND ACETAMINOPHEN 2 TABLET: 7.5; 325 TABLET ORAL at 05:01

## 2022-05-27 NOTE — DISCHARGE INSTRUCTIONS
Total Hip Arthroplasty Discharge Instructions   Peter Lundberg M.D. Please take the time to review the following instructions before you leave the hospital and use them as guidelines during your recovery from surgery. If you have any questions you may contact my office at [phone]. Wound Care / Dressing Changes:     Two days after your surgery date you should remove your dressing. A big, bulky dressing isn't necessary as long as there isn't any drainage from the incisions. If there is drainage you can put a band-aid, Primapore, or Mepilex dressing over the incision and change it daily until drainage stops. Wear DIAMOND hose as needed for comfort and swelling. No dressing is necessary if there is no drainage. It isn't necessary to apply antibiotic ointment to your incisions. Exofin tape will peel off in approximately 2-6 weeks. It does not need to be removed prior to that. When it begins to peel off you can cut the edges away with scissors. Uri Golds / Bathing: You may only shower. You may shower if there is no drainage from your incisions. Your dressing may be removed for showering but do not remove the tape that has been glued to your skin. Be careful when you remove the dressing so that you do not pull the tape that has been glued to your skin with skin glue. If when you are removing the dressing you see the incision without the tape, then check the dressing you are removing to make sure you do not take the tape off with the dressing. You may get this tape wet in the shower. Don't vigorously scrub the area where the tape is. As the tape begins to curl up you may cut away the portion that is curled up with scissors. Apply a clean, dry dressing after drying off the area of your incisions. Don't take a tub bath, get in a swimming pool or Jacuzzi until the incisions are completely healed. We can talk about it when you come to the office. Do not soak your incisions under water.      Weight Bearing Status / Braces:     Weight bearing is as tolerated. Use crutches, walker, or cane as needed for support. You may move your joints as much as tolerated. Home Health:    Home health has been arranged for skilled nursing visits and physical therapy. Your home health has been set up through____________ . If no one from the agency calls you on the day after you arrive home, please contact them at the number provided at discharge. If you are discharged on Coumadin (warfarin), Home Health will monitor PT/INR every Monday and Thursday for Coumadin dosing, therapeutic level 2.0 to 3.0. Report lab values to [facility] [place], [place], [phone]. PT, gait training and strengthening. Continue therapeutic exercises. Physical Therapy:       Begin In-Home Physical Therapy; 3 times a week to work on gait training, range of motion, strengthening, and weight bearing exercises as tolerable. Continue to use your walker or cane when walking; may progress from the walker to a cane to complete total bearing as tolerable. Ice / Elevation:     Continue ice and elevation as needed for pain and swelling. Diet:     Resume your prehospital diet. If you have excessive nausea or vomiting call your doctor's office     Medication:   {Medication reconciliation information is now added to the patient's AVS automatically when it is printed. There is no need to use this SmartLink in discharge instructions. Highlight this text and delete it to clear this message}      1. You have a prescription for pain medication when you were discharged for the hospital on Wednesday. Take the medication as needed according to the directions on the prescription bottle. Possible side effects ofthe medication include dizziness, headache, nausea, vomiting, constipation and urinary retention. If you experience any ofthese side effects call the office so that we can assist you in relieving them.  Discontinue the use ofthe pain medication if you develop itching, rash, shortness ofbreath or difficulties swallowing. If these symptoms become severe or aren't relieved by discontinuing the medication you should seek immediate medical attention. Refills of pain medication are authorized during office hours only. (8am - 5pm Mon. thru Fri.)     1. You may resume the medication you were taking prior to your surgery. Pain medication may change the effects of any antidepressant medication you are taking. Ifyou have any questions about possible interactions between your regular medications and the pain medication you should consult the physician who prescribes your regular medications. 2. Other medication notes:      Blood Thinner: You will be prescribed Aspirin 81 mg to take by mouth twice daily after meals for one month following surgery to prevent blood clots. Follow Up Appointment:   Please call [phone] for a follow appointment with Dr. Ildefonso Navas in 10-14 days from the time of your surgery. Please let our office know you are scheduling a post-op appointment. Signs and Symptoms to be Aware of: If any of the following signs and symptoms occur, you should contact Dr. Mark Constantino office. Please be advised if a problem arises which you feel requires immediate medical attention or you are unable to contact Dr. Mark Constantino office you should seek immediate medical attention at the emergency department or other health care facility you have access to. Signs and symptoms to watch for include:     1. A sudden increase in swelling and l or redness or warmth at the area your surgery was performed which isn't relieved by rest, ice and elevation. 2 Oral temperature greater than 101.5 degrees for 12 hours or more which isn't relieved by an increase in fluid intake and taking two Tylenol every 4-6 hours.    3 Excessive drainage from your incisions, or drainage which hasn't stopped by 72 hours after your surgery despite applying a compressive dressing, ice and elevation. 4 Calfpain, tenderness, redness or swelling which isn't relieved with rest and elevation. 5 Fever, chills, shortness ofbreath, chest pain, nausea, vomiting or other signs and symptoms which are of concern to you.      Other Instructions:

## 2022-05-27 NOTE — ROUTINE PROCESS
Bedside and Verbal shift change report given to Noel Paz (oncoming nurse) by Laureano Ramos RN (offgoing nurse). Report included the following information SBAR and Kardex.

## 2022-05-27 NOTE — PROGRESS NOTES
Dr. Marilyn Seth unable to get to a computer to fix patient's med rec. MD states ok to discharge patient. She is to take all of her home med's as prescribed including Keflex and Percocet.

## 2022-05-27 NOTE — PROGRESS NOTES
Discharge instructions reviewed with patient and . Pt felt confident with orthopedic discharge instructions. Highlighted some points for patient. Both report no questions at this time. Instructed patient to take Keflex and Percocet at home. Patient waiting for ride.

## 2022-05-27 NOTE — ROUTINE PROCESS
Bedside and Verbal shift change report given to Josie Lucas RN (oncoming nurse) by Frankie Leung RN (offgoing nurse). Report included the following information SBAR and Kardex.

## 2022-05-27 NOTE — DISCHARGE SUMMARY
Total Hip Discharge Summary    Patient: Nany Meeks               Sex: female         MRN: [id number]       YOB: 1950      Age:  67 y.o.        LOS:  LOS: 2 days                DOA: 5/25/2022    Discharge Date: 5/27/2022    Admission Diagnoses: Status post revision of total hip replacement [Z96.649]    Discharge Diagnoses:    Problem List as of 5/27/2022 Date Reviewed: 5/26/2022          Codes Class Noted - Resolved    Status post revision of total hip replacement ICD-10-CM: Z96.649  ICD-9-CM: V43.64  5/25/2022 - Present        Status post left hip replacement ICD-10-CM: I29.700  ICD-9-CM: V43.64  5/23/2022 - Present        S/P total knee arthroplasty ICD-10-CM: Z96.659  ICD-9-CM: V43.65  2/4/2013 - Present        RESOLVED: Osteoarthritis of right knee ICD-10-CM: M17.11  ICD-9-CM: 715.96  2/1/2013 - 2/4/2013              This is a 67 y.o. female had a total hip arthroplasty, anterior approach on Monday 5/23/22. She was at home and dislocated her hip posteriorly. She was seen at [facility] and transferred here for treatment. The patient was therefore scheduled to undergo a left total hip arthroplasty revision with Dr. Berta Cho. The patient was taken to the operating room for the above-stated procedure. IV antibiotics were given prior to the incision. SCDs were used for DVT prophylaxis. The patient had an estimated intraoperative blood loss of 200  mL. The patient tolerated the procedure well without any complications, and was taken to the recovery room in stable condition. The patient was then transferred to the postoperative orthopedic floor for convalescence, PT, pain management, as well as discharge planning. Physical therapy and occupational therapy initiated their evaluation and treatment and continued to follow the patient until the patient was discharged. Post operative pain was adequately managed with use of oral narcotics prior to discharge.  DVT prophylaxis was initiated on the day of surgery including; Lovenox, compression stockings and bilateral foot pumps. At the time of discharge, the patient was able to ambulate safely, go up and down stairs and had an understanding of the explicit discharge precautions and instructions following surgery. Home Health will come out to assist the patient with this. The patient was discharged to follow up with Dr. Sudha Varma in approximately 10 to 14 days. Discharge Condition: Good  DISPOSITION: To home. On the day of discharge the patient was afebrile. Vital signs were stable. The patient was in no acute distress. her Left hip incision was clean, dry, and intact. Extremity was warm and well-perfused, distally neurovascularly intact. DISCHARGE INSTRUCTIONS:  The patient will be discharged home on Aspirin 81 mg PO BID x 1 month for DVT prophylaxis. Continue physical therapy for gait training and strengthening. Continue therapeutic exercises. Follow up in 10 to 14 days with Dr. Sudha Varma. DISCHARGE MEDICATIONS:   Current Discharge Medication List      CONTINUE these medications which have NOT CHANGED    Details   aspirin delayed-release 81 mg tablet Take 1 Tablet by mouth two (2) times a day. Qty: 60 Tablet, Refills: 0      linaCLOtide (Linzess) 145 mcg cap capsule Take 145 mg by mouth. Indications: IBS      clonazePAM (KlonoPIN) 1 mg tablet Take 1 mg by mouth two (2) times a day. triamterene-hydroCHLOROthiazide (DYAZIDE) 37.5-25 mg per capsule Take 1 Capsule by mouth daily. escitalopram (LEXAPRO) 20 mg tablet Take 20 mg by mouth daily. ARIPiprazole (ABILIFY) 2 mg tablet Take 10 mg by mouth nightly. Indications: depression      amitriptyline (ELAVIL) 100 mg tablet Take 100 mg by mouth two (2) times a day. Indications: helps with sleep      naloxone (NARCAN) 4 mg/actuation nasal spray Use 1 spray intranasally, then discard. Repeat with new spray every 2 min as needed for opioid overdose symptoms, alternating nostrils.   Qty: 1 Each, Refills: 0      flavoxate HCl (FLAVOXATE PO) Take  by mouth. fluvoxaMINE (LUVOX) 50 mg tablet Take 50 mg by mouth nightly. Indications: depression      Dexlansoprazole (DEXILANT) 60 mg CpDM Take 1 Cap by mouth daily.  Pt instructed to take with a small bit of water on DOS         STOP taking these medications       oxyCODONE-acetaminophen (PERCOCET) 5-325 mg per tablet Comments:   Reason for Stopping:         cephALEXin (KEFLEX) 500 mg capsule Comments:   Reason for Stopping:

## 2022-05-27 NOTE — NURSE NAVIGATOR
Reviewed education again about recovery after surgery. Stressed the importance of using her walker at all times. To add foods with iron to her diet. Also stressed the importance of taking a laxative today since her bowels have not moved since before the first surgery on 5/23/2022. Patient stating she feels better today. Eating breakfast with no complaints. Reports using her incentive spirometer every hour and doing her ankle pumps. Reminded to continue to use ice on her hip and leg anywhere she has swelling. Patient left with call light, phone, and belongings in reach. Patient ready to get catheter out and start voiding trial.  Tobias Gardiners to call surgeon to get an order. [phone] Dr. Gilbert Yap ordered labwork of H & H stat, ordered voiding trail this morning. Requesting Ancef IV be continued while patient is here at [facility]. Dr. Gilbert Yap to round on patient around 11 am.  Travis Mitchell RN and patient updated. Given a list of Iron Rich food.        Nurse Navigator

## 2022-05-27 NOTE — PROGRESS NOTES
2350-Resting quietly in bed, stable. Patient does not want to be bothered, arouses easily. Mepilex dressing left thigh, intact, no drainage. Ice pack in place. Positive dorsalis pedis pulse to left lower extremity. Incentive spirometer in use, needs encouragement. Call light and personal items within reach. Assessment complete. 0430-Assisted out of bed, sitting up in chair at bedside. Chlorhexidine wipes completed. Bed, gown, and sheets changed. 0505-Returned to bed. Stable. 0530-Resting quietly in bed eyes closed, respirations 16-18. Call light within reach. 0602-Resting in bed, eyes closed, arouses easily. Stable. No complaints. 0630-Resting quietly in bed. Stable.

## 2022-05-27 NOTE — PROGRESS NOTES
Problem: Mobility Impaired (Adult and Pediatric)  Goal: *Acute Goals and Plan of Care (Insert Text)  Description: Physical Therapy Goals  Initiated 5/26/2022  to be met within 2-3 days  STG's:  1. Bed mobility:  Supine to/from sit with S in prep for ADL activity. 2. Transfers:  Sit to/from stand with S/SBA/RW in prep for gait. LTG's  1. Ambulation:  Ambulate 100 ft.with S/SBA/RW for home mobility at discharge. 2. Step Negotiation: Ascend/Descend 3-5 steps with CGA with HR for home navigation as indicated. Outcome: Progressing Towards Goal   [x]  Patient has met MD brenda eden for d/c home   []  Recommend HH with 24 hour adult care   []  Benefit from additional acute PT session to address:      PHYSICAL THERAPY TREATMENT    Patient: Tricia Felix (34 y.o. female)  Date: 5/27/2022  Diagnosis: Status post revision of total hip replacement [Z96.649] <principal problem not specified>  Procedure(s) (LRB):  Revision Left Total Hip (Left) 2 Days Post-Op  Precautions: Fall  PLOF: ambulating with rw s/p LAURA    ASSESSMENT:  Pt in bed upon arrival.  Utilized self assist [employer] tech with instruction to assist LLE out of bed. Elevated bed to height of bed at home, VC for UE placement needed. Ambulated 100ft with RW, step to gt pattern, steady slow pace, no LOB or path deviations. Returned to room and left sitting in chair. Progression toward goals:   [x]      Improving appropriately and progressing toward goals  []      Improving slowly and progressing toward goals  []      Not making progress toward goals and plan of care will be adjusted     PLAN:  Patient continues to benefit from skilled intervention to address the above impairments. Continue treatment per established plan of care.   Discharge Recommendations:  Home Physical Therapy  Further Equipment Recommendations for Discharge:  rolling walker     SUBJECTIVE:   Patient stated I don't have any pain right now, I hope it stays that way.    OBJECTIVE DATA SUMMARY:   Critical Behavior:  Neurologic State: Appropriate for age,Alert  Orientation Level: Oriented X4  Cognition: Appropriate for age attention/concentration,Follows commands  Safety/Judgement: Awareness of environment,Fall prevention  Functional Mobility Training:  Bed Mobility:    Supine to Sit: Modified independent; Additional time    Scooting: Additional time  Transfers:  Sit to Stand: Stand-by assistance;Contact guard assistance  Stand to Sit: Stand-by assistance  Balance:  Sitting: Intact  Standing: Intact; With support  Standing - Static: Good  Standing - Dynamic : Good   Ambulation/Gait Training:  Distance (ft): 100 Feet (ft)  Assistive Device: Gait belt;Walker, rolling  Ambulation - Level of Assistance: Supervision  Left Side Weight Bearing: As tolerated  Speed/Faye: Slow        Pain:  Pain level pre-treatment: 0/10  Pain level post-treatment: 2/10   Pain Intervention(s): Medication (see MAR); Rest, Ice, Repositioning   Response to intervention: Nurse notified, See doc flow    Activity Tolerance:   good  Please refer to the flowsheet for vital signs taken during this treatment. After treatment:   [x] Patient left in no apparent distress sitting up in chair  [] Patient left in no apparent distress in bed  [x] Call bell left within reach  [x] Nursing notified  [x] Caregiver present  [] Bed alarm activated  [] SCDs applied      COMMUNICATION/EDUCATION:   [x]         Role of Physical Therapy in the acute care setting. [x]         Fall prevention education was provided and the patient/caregiver indicated understanding. [x]         Patient/family have participated as able in working toward goals and plan of care. [x]         Patient/family agree to work toward stated goals and plan of care. []         Patient understands intent and goals of therapy, but is neutral about his/her participation.   []         Patient is unable to participate in stated goals/plan of care: ongoing with therapy staff.  []         Other:        Lozano Janes, PTA   Time Calculation: 17 mins

## 2022-05-27 NOTE — PROGRESS NOTES
Patient voided 250 mL clear yellow urine. Dr. Marilyn Seth aware along with H/H levels. MD at bedside talking with patient.

## 2022-05-27 NOTE — PROGRESS NOTES
Problem: Falls - Risk of  Goal: *Absence of Falls  Description: Document Clover Sauceda Fall Risk and appropriate interventions in the flowsheet.   Outcome: Progressing Towards Goal  Note: Fall Risk Interventions:  Mobility Interventions: Utilize walker, cane, or other assistive device,Communicate number of staff needed for ambulation/transfer         Medication Interventions: Teach patient to arise slowly,Patient to call before getting OOB    Elimination Interventions: Patient to call for help with toileting needs,Call light in reach    History of Falls Interventions: Door open when patient unattended         Problem: Patient Education: Go to Patient Education Activity  Goal: Patient/Family Education  Outcome: Progressing Towards Goal     Problem: Pain  Goal: *Control of Pain  Outcome: Progressing Towards Goal     Problem: Patient Education: Go to Patient Education Activity  Goal: Patient/Family Education  Outcome: Progressing Towards Goal     Problem: Patient Education: Go to Patient Education Activity  Goal: Patient/Family Education  Outcome: Progressing Towards Goal     Problem: Hip Replacement: Day of Surgery/Unit  Goal: Activity/Safety  Outcome: Progressing Towards Goal  Goal: Consults, if ordered  Outcome: Progressing Towards Goal  Goal: Diagnostic Test/Procedures  Outcome: Progressing Towards Goal  Goal: Nutrition/Diet  Outcome: Progressing Towards Goal  Goal: Medications  Outcome: Progressing Towards Goal  Goal: Respiratory  Outcome: Progressing Towards Goal  Goal: Treatments/Interventions/Procedures  Outcome: Progressing Towards Goal  Goal: Psychosocial  Outcome: Progressing Towards Goal  Goal: *Initiate mobility  Outcome: Progressing Towards Goal  Goal: *Optimal pain control at patient's stated goal  Outcome: Progressing Towards Goal  Goal: *Hemodynamically stable  Outcome: Progressing Towards Goal     Problem: Patient Education: Go to Patient Education Activity  Goal: Patient/Family Education  Outcome: Progressing Towards Goal

## 2022-05-27 NOTE — ROUTINE PROCESS
Bedside and Verbal shift change report given to OMAR Silver RN by Baljinder Stevens RN. Report included the following information SBAR, Kardex, Intake/Output and MAR.

## 2022-05-27 NOTE — PROGRESS NOTES
Assessment/Plan:  1  Strain of right wrist, subsequent encounter         Scribe Attestation    I,:  Apryl Stein MA am acting as a scribe while in the presence of the attending physician :       I,:  Eleazar Pagan DO personally performed the services described in this documentation    as scribed in my presence :             The MRI was discussed with her and her mother today which is normal  There is no evidence of healing fractures  All the ligaments and tendons are intact  Patient's pain has improved  She was advised to wean back into normal activities  She has no restrictions at this time  She may return to gym and sports and a note was provided for this  She may follow up with me as needed  Subjective:   Folrencio Stauffer is a 14 y o  female who presents to the office today for follow up evaluation of her right wrist  We were treating her for a suspected distal radius fracture and FCR strain  X-rays have been negative for fracture however, due to pain on exam we treated as a fracture  A MRI was ordered at her last visit  Patient presents to the office today to review the MRI  She states her pain has resolved  She denies any pain today  Review of Systems   Constitutional: Negative for chills and fever  HENT: Negative for drooling and sneezing  Eyes: Negative for redness  Respiratory: Negative for cough and wheezing  Gastrointestinal: Negative for nausea and vomiting  Musculoskeletal: Negative for arthralgias, joint swelling and myalgias  Neurological: Negative for weakness and numbness  Psychiatric/Behavioral: Negative for behavioral problems  The patient is not nervous/anxious            Past Medical History:   Diagnosis Date    Patient denies medical problems        Past Surgical History:   Procedure Laterality Date    NO PAST SURGERIES         Family History   Problem Relation Age of Onset    No Known Problems Mother     No Known Problems Father        Social History Problem: Falls - Risk of  Goal: *Absence of Falls  Description: Document Rosemary Dean Fall Risk and appropriate interventions in the flowsheet. Outcome: Progressing Towards Goal  Note: Fall Risk Interventions:  Mobility Interventions: Utilize walker, cane, or other assistive device,PT Consult for assist device competence,PT Consult for mobility concerns,Patient to call before getting OOB,Communicate number of staff needed for ambulation/transfer         Medication Interventions: Teach patient to arise slowly,Patient to call before getting OOB    Elimination Interventions: Call light in reach,Patient to call for help with toileting needs,Stay With Me (per policy)    History of Falls Interventions:  Investigate reason for fall         Problem: Pain  Goal: *Control of Pain  Outcome: Progressing Towards Goal Occupational History    Not on file   Tobacco Use    Smoking status: Passive Smoke Exposure - Never Smoker    Smokeless tobacco: Never Used   Substance and Sexual Activity    Alcohol use: No    Drug use: No    Sexual activity: Not on file         Current Outpatient Medications:     albuterol (PROVENTIL HFA,VENTOLIN HFA) 90 mcg/act inhaler, Inhale 2 puffs, Disp: , Rfl:     EPINEPHrine (EPIPEN) 0 3 mg/0 3 mL SOAJ, Inject 0 3 mg into a muscle, Disp: , Rfl:     Allergies   Allergen Reactions    Amoxicillin-Pot Clavulanate Swelling, Rash and Hives    Penicillins Swelling and Rash    Red Dye - Food Allergy Hives       Objective:  Vitals:    04/15/21 1500   BP: (!) 97/66   Pulse: 79       Ortho Exam     Right wrist    Full ROM  NTTP   Compartments soft  Brisk capillary refill  S/m intact median, radial, and ulnar nerve     Physical Exam  Constitutional:       Appearance: She is well-developed  HENT:      Head: Normocephalic and atraumatic  Eyes:      General:         Right eye: No discharge  Left eye: No discharge  Conjunctiva/sclera: Conjunctivae normal    Neck:      Musculoskeletal: Normal range of motion and neck supple  Cardiovascular:      Rate and Rhythm: Normal rate  Pulmonary:      Effort: Pulmonary effort is normal  No respiratory distress  Musculoskeletal:      Comments: As noted in HPI   Skin:     General: Skin is warm and dry  Neurological:      Mental Status: She is alert and oriented to person, place, and time  Psychiatric:         Behavior: Behavior normal          Thought Content: Thought content normal          Judgment: Judgment normal          I have personally reviewed pertinent films in PACS and my interpretation is as follows:MRI right wrist performed at an outside facility on 4/9/21 demonstrates normal MRI

## 2022-06-07 NOTE — OP NOTES
[facility]  OPERATIVE REPORT    Name:  Hayes Osuna  MR#:   [id number]  :  1950  ACCOUNT #:  [de-identified]  DATE OF SERVICE:  2022    PREOPERATIVE DIAGNOSIS:  Dislocated left total hip arthroplasty. POSTOPERATIVE DIAGNOSIS:  Dislocated left total hip arthroplasty. PROCEDURE PERFORMED:  Revision of left total hip arthroplasty. SURGEON:  Lina Bullard MD.    ASSISTANT:  none. ANESTHESIA:  General.    ANESTHESIOLOGIST:  Kelley. COMPLICATIONS:  None. SPECIMENS REMOVED:   acetabular shell, acetabular liner, femoral head, femoral polyethylene liner. .    IMPLANTS:  Changed the acetabular shell, acetabular liner, femoral head, femoral polyethylene liner. ESTIMATED BLOOD LOSS:  300 mL. INDICATIONS FOR OPERATION:  The patient is a 76-year-old lady, who underwent a total hip arthroplasty two days prior. She was standing at home, twisted in turning towards her left, and her hip came out of the joint and she fell down. She was seen at [facility]  showed a dislocated total hip arthroplasty. She was transferred to [facility] and scheduled for closed reduction versus revision of the left hip arthroplasty. PROCEDURE:  The patient was taken to the operating room, a satisfactory general anesthesia was established, reduction was done. The patient's initial foot position was with the foot pretty much straight up and down, but her normal position on her right hip was with her foot turned out approximately 40 degrees. Image intensification did show the dislocation to be primarily superior, but somewhat posterior. Reduction maneuver was done consisting of traction followed by hip flexion and ultimately external rotation, which did relocate the hip. Image intensification was used, which showed concentric reduction of the implant. The hip was externally rotated in extension and the hip remained stable.   With flexion and internal rotation, the hip did pop out the back taking the acetabular liner with it. Image intensification showed the ball liner and the acetabular shell all to be out of position relative to each other. Based on the ease at which the hip went out, I expected preoperatively that open treatment of this would be required and with the liner now displaced, there was no way to treat this in a closed fashion. The hip was prepped with Betadine soak followed by Betadine solution and draped in a sterile fashion. The previous incision was removed with an ellipse type of incision, which did remove all components of the previous incision. Dissection was continued down to the tensor fasciae latae. This tissue was opened up. There was some serous material that was removed from the wound deep to this layer. The acetabular liner was removed at this time. Attention was directed to the proximal femur and the femoral head was impacted to remove the head from the stem. The stem was well fixed. The screw was then removed from the acetabulum, and the acetabular component was removed at this time. The initial surgery had placed a 54 mm cup, which was able to accommodate a 42 mm femoral head construct. Reaming progressed up to 55 mm such that a 56 mm shell could be placed. This gave the added benefit of being able to use a 46 mm head, which would help improve the stability of the hip based on a further 4 mm jump height. The initial placement of the acetabular liner was placed in a more anteverted position, approximately 20-25 degrees of further anteversion was placed as compared to the initial hip placement. The overall position was felt to be satisfactory. The new acetabular liner was placed. The neck length of the initial implant was a +4 mm neck, and this was increased to a +8. This was able to be reduced with some difficulty, fair amount of force, both pushing the femoral head was required as well as a significant amount of traction. Upon relocation of the hip, the components were felt to be quite stable, easily tolerating 90+ degrees of external rotation. Overall, it was felt that the changes of making the femoral head larger, the femoral neck longer, and the anteversion greater, was felt to significantly increase the patient's stability. The wound was irrigated with antibiotic irrigation. The tensor fasciae latae was closed with a running #1 Vicryl. Subcutaneous tissue was closed using 2-0 Monocryl in layers followed by a subcuticular 3-0 Monocryl followed by the Prineo system and the Mepilex dressing. The patient tolerated the procedure well, was transferred onto her recovery room bed, and taken to the recovery room in stable condition.       Shakir Garrett MD      [email or web address]  D:  06/06/2022 15:29  T:  06/06/2022 23:08  JOB #:  [id number]

## 2022-07-11 ENCOUNTER — ANESTHESIA EVENT (OUTPATIENT)
Dept: SURGERY | Age: 72
End: 2022-07-11
Payer: MEDICARE

## 2022-07-11 ENCOUNTER — APPOINTMENT (OUTPATIENT)
Dept: GENERAL RADIOLOGY | Age: 72
End: 2022-07-11
Attending: ORTHOPAEDIC SURGERY
Payer: MEDICARE

## 2022-07-11 ENCOUNTER — HOSPITAL ENCOUNTER (OUTPATIENT)
Age: 72
Discharge: HOME | End: 2022-07-11
Attending: STUDENT IN AN ORGANIZED HEALTH CARE EDUCATION/TRAINING PROGRAM | Admitting: ORTHOPAEDIC SURGERY
Payer: MEDICARE

## 2022-07-11 ENCOUNTER — ANESTHESIA (OUTPATIENT)
Dept: SURGERY | Age: 72
End: 2022-07-11
Payer: MEDICARE

## 2022-07-11 ENCOUNTER — APPOINTMENT (OUTPATIENT)
Dept: GENERAL RADIOLOGY | Age: 72
End: 2022-07-11
Attending: EMERGENCY MEDICINE
Payer: MEDICARE

## 2022-07-11 VITALS
WEIGHT: 196 LBS | RESPIRATION RATE: 12 BRPM | DIASTOLIC BLOOD PRESSURE: 74 MMHG | HEART RATE: 80 BPM | TEMPERATURE: 97 F | SYSTOLIC BLOOD PRESSURE: 131 MMHG | BODY MASS INDEX: 28.12 KG/M2 | OXYGEN SATURATION: 100 %

## 2022-07-11 DIAGNOSIS — Z96.642 HISTORY OF LEFT HIP REPLACEMENT: ICD-10-CM

## 2022-07-11 DIAGNOSIS — S73.005A CLOSED DISLOCATION OF LEFT HIP, INITIAL ENCOUNTER (HCC): Primary | ICD-10-CM

## 2022-07-11 LAB
ABO + RH BLD: NORMAL
ANION GAP SERPL CALC-SCNC: 5 MMOL/L (ref 3–18)
BASOPHILS # BLD: 0 K/UL (ref 0–0.1)
BASOPHILS NFR BLD: 0 % (ref 0–2)
BLOOD GROUP ANTIBODIES SERPL: NORMAL
BUN SERPL-MCNC: 15 MG/DL (ref 7–18)
BUN/CREAT SERPL: 14 (ref 12–20)
CALCIUM SERPL-MCNC: 8.8 MG/DL (ref 8.5–10.1)
CHLORIDE SERPL-SCNC: 97 MMOL/L (ref 100–111)
CO2 SERPL-SCNC: 31 MMOL/L (ref 21–32)
CREAT SERPL-MCNC: 1.08 MG/DL (ref 0.6–1.3)
DIFFERENTIAL METHOD BLD: ABNORMAL
EOSINOPHIL # BLD: 0.1 K/UL (ref 0–0.4)
EOSINOPHIL NFR BLD: 2 % (ref 0–5)
ERYTHROCYTE [DISTWIDTH] IN BLOOD BY AUTOMATED COUNT: 11.9 % (ref 11.6–14.5)
GLUCOSE SERPL-MCNC: 121 MG/DL (ref 74–99)
HCT VFR BLD AUTO: 32.5 % (ref 35–45)
HGB BLD-MCNC: 10.5 G/DL (ref 12–16)
IMM GRANULOCYTES # BLD AUTO: 0 K/UL (ref 0–0.04)
IMM GRANULOCYTES NFR BLD AUTO: 0 % (ref 0–0.5)
LYMPHOCYTES # BLD: 0.9 K/UL (ref 0.9–3.6)
LYMPHOCYTES NFR BLD: 12 % (ref 21–52)
MCH RBC QN AUTO: 30.4 PG (ref 24–34)
MCHC RBC AUTO-ENTMCNC: 32.3 G/DL (ref 31–37)
MCV RBC AUTO: 94.2 FL (ref 78–100)
MONOCYTES # BLD: 0.4 K/UL (ref 0.05–1.2)
MONOCYTES NFR BLD: 5 % (ref 3–10)
NEUTS SEG # BLD: 6.5 K/UL (ref 1.8–8)
NEUTS SEG NFR BLD: 81 % (ref 40–73)
NRBC # BLD: 0 K/UL (ref 0–0.01)
NRBC BLD-RTO: 0 PER 100 WBC
PLATELET # BLD AUTO: 275 K/UL (ref 135–420)
PMV BLD AUTO: 9 FL (ref 9.2–11.8)
POTASSIUM SERPL-SCNC: 3.5 MMOL/L (ref 3.5–5.5)
RBC # BLD AUTO: 3.45 M/UL (ref 4.2–5.3)
SODIUM SERPL-SCNC: 133 MMOL/L (ref 136–145)
SPECIMEN EXP DATE BLD: NORMAL
WBC # BLD AUTO: 8 K/UL (ref 4.6–13.2)

## 2022-07-11 PROCEDURE — 74011250636 HC RX REV CODE- 250/636: Performed by: NURSE ANESTHETIST, CERTIFIED REGISTERED

## 2022-07-11 PROCEDURE — 76210000063 HC OR PH I REC FIRST 0.5 HR: Performed by: ORTHOPAEDIC SURGERY

## 2022-07-11 PROCEDURE — 86900 BLOOD TYPING SEROLOGIC ABO: CPT

## 2022-07-11 PROCEDURE — 77030013079 HC BLNKT BAIR HGGR 3M -A: Performed by: ANESTHESIOLOGY

## 2022-07-11 PROCEDURE — 77030040830 HC CATH URETH FOL MDII -A: Performed by: ORTHOPAEDIC SURGERY

## 2022-07-11 PROCEDURE — 77030012508 HC MSK AIRWY LMA AMBU -A: Performed by: ANESTHESIOLOGY

## 2022-07-11 PROCEDURE — 76060000032 HC ANESTHESIA 0.5 TO 1 HR: Performed by: ORTHOPAEDIC SURGERY

## 2022-07-11 PROCEDURE — 77030020782 HC GWN BAIR PAWS FLX 3M -B: Performed by: ORTHOPAEDIC SURGERY

## 2022-07-11 PROCEDURE — 77030040361 HC SLV COMPR DVT MDII -B: Performed by: ORTHOPAEDIC SURGERY

## 2022-07-11 PROCEDURE — 96374 THER/PROPH/DIAG INJ IV PUSH: CPT

## 2022-07-11 PROCEDURE — 74011000250 HC RX REV CODE- 250: Performed by: NURSE ANESTHETIST, CERTIFIED REGISTERED

## 2022-07-11 PROCEDURE — 99285 EMERGENCY DEPT VISIT HI MDM: CPT

## 2022-07-11 PROCEDURE — 74011250636 HC RX REV CODE- 250/636: Performed by: PHYSICIAN ASSISTANT

## 2022-07-11 PROCEDURE — 80048 BASIC METABOLIC PNL TOTAL CA: CPT

## 2022-07-11 PROCEDURE — 73502 X-RAY EXAM HIP UNI 2-3 VIEWS: CPT

## 2022-07-11 PROCEDURE — 76010000138 HC OR TIME 0.5 TO 1 HR: Performed by: ORTHOPAEDIC SURGERY

## 2022-07-11 PROCEDURE — 76210000020 HC REC RM PH II FIRST 0.5 HR: Performed by: ORTHOPAEDIC SURGERY

## 2022-07-11 PROCEDURE — 77030040241 HC ABD PLLW HIP MDII -B: Performed by: ORTHOPAEDIC SURGERY

## 2022-07-11 PROCEDURE — 85025 COMPLETE CBC W/AUTO DIFF WBC: CPT

## 2022-07-11 RX ORDER — FENTANYL CITRATE 50 UG/ML
25 INJECTION, SOLUTION INTRAMUSCULAR; INTRAVENOUS AS NEEDED
Status: DISCONTINUED | OUTPATIENT
Start: 2022-07-11 | End: 2022-07-11 | Stop reason: HOSPADM

## 2022-07-11 RX ORDER — ALBUTEROL SULFATE 0.83 MG/ML
2.5 SOLUTION RESPIRATORY (INHALATION) AS NEEDED
Status: DISCONTINUED | OUTPATIENT
Start: 2022-07-11 | End: 2022-07-11 | Stop reason: HOSPADM

## 2022-07-11 RX ORDER — FLUMAZENIL 0.1 MG/ML
0.2 INJECTION INTRAVENOUS
Status: DISCONTINUED | OUTPATIENT
Start: 2022-07-11 | End: 2022-07-11 | Stop reason: HOSPADM

## 2022-07-11 RX ORDER — SODIUM CHLORIDE, SODIUM LACTATE, POTASSIUM CHLORIDE, CALCIUM CHLORIDE 600; 310; 30; 20 MG/100ML; MG/100ML; MG/100ML; MG/100ML
INJECTION, SOLUTION INTRAVENOUS
Status: DISCONTINUED | OUTPATIENT
Start: 2022-07-11 | End: 2022-07-11 | Stop reason: HOSPADM

## 2022-07-11 RX ORDER — DIPHENHYDRAMINE HYDROCHLORIDE 50 MG/ML
12.5 INJECTION, SOLUTION INTRAMUSCULAR; INTRAVENOUS
Status: DISCONTINUED | OUTPATIENT
Start: 2022-07-11 | End: 2022-07-11 | Stop reason: HOSPADM

## 2022-07-11 RX ORDER — OXYCODONE AND ACETAMINOPHEN 5; 325 MG/1; MG/1
1 TABLET ORAL AS NEEDED
Status: DISCONTINUED | OUTPATIENT
Start: 2022-07-11 | End: 2022-07-11 | Stop reason: HOSPADM

## 2022-07-11 RX ORDER — PROPOFOL 10 MG/ML
INJECTION, EMULSION INTRAVENOUS AS NEEDED
Status: DISCONTINUED | OUTPATIENT
Start: 2022-07-11 | End: 2022-07-11 | Stop reason: HOSPADM

## 2022-07-11 RX ORDER — ONDANSETRON 2 MG/ML
INJECTION INTRAMUSCULAR; INTRAVENOUS AS NEEDED
Status: DISCONTINUED | OUTPATIENT
Start: 2022-07-11 | End: 2022-07-11 | Stop reason: HOSPADM

## 2022-07-11 RX ORDER — DEXAMETHASONE SODIUM PHOSPHATE 4 MG/ML
INJECTION, SOLUTION INTRA-ARTICULAR; INTRALESIONAL; INTRAMUSCULAR; INTRAVENOUS; SOFT TISSUE AS NEEDED
Status: DISCONTINUED | OUTPATIENT
Start: 2022-07-11 | End: 2022-07-11 | Stop reason: HOSPADM

## 2022-07-11 RX ORDER — FENTANYL CITRATE 50 UG/ML
INJECTION, SOLUTION INTRAMUSCULAR; INTRAVENOUS AS NEEDED
Status: DISCONTINUED | OUTPATIENT
Start: 2022-07-11 | End: 2022-07-11 | Stop reason: HOSPADM

## 2022-07-11 RX ORDER — NALOXONE HYDROCHLORIDE 0.4 MG/ML
0.2 INJECTION, SOLUTION INTRAMUSCULAR; INTRAVENOUS; SUBCUTANEOUS AS NEEDED
Status: DISCONTINUED | OUTPATIENT
Start: 2022-07-11 | End: 2022-07-11 | Stop reason: HOSPADM

## 2022-07-11 RX ORDER — SODIUM CHLORIDE, SODIUM LACTATE, POTASSIUM CHLORIDE, CALCIUM CHLORIDE 600; 310; 30; 20 MG/100ML; MG/100ML; MG/100ML; MG/100ML
1000 INJECTION, SOLUTION INTRAVENOUS CONTINUOUS
Status: DISCONTINUED | OUTPATIENT
Start: 2022-07-11 | End: 2022-07-11 | Stop reason: HOSPADM

## 2022-07-11 RX ORDER — HYDROMORPHONE HYDROCHLORIDE 1 MG/ML
0.2 INJECTION, SOLUTION INTRAMUSCULAR; INTRAVENOUS; SUBCUTANEOUS
Status: DISCONTINUED | OUTPATIENT
Start: 2022-07-11 | End: 2022-07-11 | Stop reason: HOSPADM

## 2022-07-11 RX ORDER — MORPHINE SULFATE 2 MG/ML
2 INJECTION, SOLUTION INTRAMUSCULAR; INTRAVENOUS
Status: COMPLETED | OUTPATIENT
Start: 2022-07-11 | End: 2022-07-11

## 2022-07-11 RX ORDER — LIDOCAINE HYDROCHLORIDE 20 MG/ML
INJECTION, SOLUTION EPIDURAL; INFILTRATION; INTRACAUDAL; PERINEURAL AS NEEDED
Status: DISCONTINUED | OUTPATIENT
Start: 2022-07-11 | End: 2022-07-11 | Stop reason: HOSPADM

## 2022-07-11 RX ORDER — MIDAZOLAM HYDROCHLORIDE 1 MG/ML
INJECTION, SOLUTION INTRAMUSCULAR; INTRAVENOUS AS NEEDED
Status: DISCONTINUED | OUTPATIENT
Start: 2022-07-11 | End: 2022-07-11 | Stop reason: HOSPADM

## 2022-07-11 RX ADMIN — ONDANSETRON HYDROCHLORIDE 4 MG: 2 INJECTION INTRAMUSCULAR; INTRAVENOUS at 18:45

## 2022-07-11 RX ADMIN — LIDOCAINE HYDROCHLORIDE 60 MG: 20 INJECTION, SOLUTION INTRAVENOUS at 18:29

## 2022-07-11 RX ADMIN — MORPHINE SULFATE 2 MG: 2 INJECTION, SOLUTION INTRAMUSCULAR; INTRAVENOUS at 16:46

## 2022-07-11 RX ADMIN — DEXAMETHASONE SODIUM PHOSPHATE 4 MG: 4 INJECTION, SOLUTION INTRAMUSCULAR; INTRAVENOUS at 18:45

## 2022-07-11 RX ADMIN — PROPOFOL 180 MG: 10 INJECTION, EMULSION INTRAVENOUS at 18:29

## 2022-07-11 RX ADMIN — MIDAZOLAM 2 MG: 1 INJECTION INTRAMUSCULAR; INTRAVENOUS at 18:20

## 2022-07-11 RX ADMIN — SODIUM CHLORIDE, SODIUM LACTATE, POTASSIUM CHLORIDE, AND CALCIUM CHLORIDE: 600; 310; 30; 20 INJECTION, SOLUTION INTRAVENOUS at 18:20

## 2022-07-11 RX ADMIN — FENTANYL CITRATE 50 MCG: 50 INJECTION, SOLUTION INTRAMUSCULAR; INTRAVENOUS at 18:39

## 2022-07-11 NOTE — ED TRIAGE NOTES
Pt presents for L hip pain s/p bending over. Possible hip dislocation. LLE shortened .  THR 5/23 and revision 5/25 w/ Dr. Renella Meckel    100 Fentanyl and 4 zofran PTA  Pedal pulses palpable

## 2022-07-11 NOTE — DISCHARGE SUMMARY
Closed Reduction Dislocated THR Discharge Summary    Patient: aKran García               Sex: female         MRN: [id number]       YOB: 1950      Age:  67 y.o.        LOS:  LOS: 0 days                DOA: 7/11/2022    Discharge Date: 7/11/2022    Admission Diagnoses: Hip dislocation, left, initial encounter [facility]) [S73.005A]    Discharge Diagnoses:    Problem List as of 7/11/2022 Date Reviewed: 7/11/2022          Codes Class Noted - Resolved    Hip dislocation, left, initial encounter [facility]) ICD-10-CM: S73.005A  ICD-9-CM: 835.00  7/11/2022 - Present        Status post revision of total hip replacement ICD-10-CM: Z96.649  ICD-9-CM: V43.64  5/25/2022 - Present        Status post left hip replacement ICD-10-CM: B75.381  ICD-9-CM: V43.64  5/23/2022 - Present        S/P total knee arthroplasty ICD-10-CM: Z96.659  ICD-9-CM: V43.65  2/4/2013 - Present        RESOLVED: Osteoarthritis of right knee ICD-10-CM: M17.11  ICD-9-CM: 715.96  2/1/2013 - 2/4/2013              This is a 67 y.o. female who presents with a left total hip arthroplasty posterior dislocation. The patient had instability with her primary THR and has already had one open revision of the THR. She experienced a dislocation of her revised hip today and came to the hospital for treatment. Risks and benefits of the procedure were explained to the patient as well as other treatment options and possible surgical outcomes. The patient acknowledged and consent was obtained. The patient was therefore scheduled to undergo a left total hip closed reduction with Dr. Tammie Vanegas. The patient was taken to the operating room for the above-stated procedure. SCDs were used for DVT prophylaxis. The patient tolerated the procedure well without any complications, and was taken to the recovery room in stable condition.  At the time of discharge, the patient was able to ambulate safely and had an understanding of the explicit discharge precautions and instructions following surgery. The patient was discharged to follow up with Dr. Travis Arellano in approximately 10 to 14 days. Discharge Condition: Good  DISPOSITION: To home. On the day of discharge the patient was afebrile. Vital signs were stable. Extremity was warm and well-perfused, distally neurovascularly intact. DISCHARGE INSTRUCTIONS:    The patient may progress to weight bearing as tolerated with walker if needed. She is to avoid hip flexion greater than 90 degrees. Follow up in 10 to 14 days with Dr. Travis Arellano. DISCHARGE MEDICATIONS:   Current Discharge Medication List      CONTINUE these medications which have NOT CHANGED    Details   aspirin delayed-release 81 mg tablet Take 1 Tablet by mouth two (2) times a day. Qty: 60 Tablet, Refills: 0      naloxone (NARCAN) 4 mg/actuation nasal spray Use 1 spray intranasally, then discard. Repeat with new spray every 2 min as needed for opioid overdose symptoms, alternating nostrils. Qty: 1 Each, Refills: 0      flavoxate HCl (FLAVOXATE PO) Take  by mouth. fluvoxaMINE (LUVOX) 50 mg tablet Take 50 mg by mouth nightly. Indications: depression      linaCLOtide (Linzess) 145 mcg cap capsule Take 145 mg by mouth. Indications: IBS      clonazePAM (KlonoPIN) 1 mg tablet Take 1 mg by mouth two (2) times a day. triamterene-hydroCHLOROthiazide (DYAZIDE) 37.5-25 mg per capsule Take 1 Capsule by mouth daily. Dexlansoprazole (DEXILANT) 60 mg CpDM Take 1 Cap by mouth daily. Pt instructed to take with a small bit of water on DOS      escitalopram (LEXAPRO) 20 mg tablet Take 20 mg by mouth daily. ARIPiprazole (ABILIFY) 2 mg tablet Take 10 mg by mouth nightly. Indications: depression      amitriptyline (ELAVIL) 100 mg tablet Take 100 mg by mouth two (2) times a day.  Indications: helps with sleep

## 2022-07-11 NOTE — DISCHARGE INSTRUCTIONS
Post operative Instructions for Total Hip Arthroplasty Closed Reduction   1. You may progress to weight bearing as tolerated. You may use a walker or cane as needed. 2. Avoid flexion of the hip greater than 90 degrees. 3. Follow up in 7-10 days. Call to schedule appointment. [phone]. DISCHARGE SUMMARY from Nurse    PATIENT INSTRUCTIONS:    After general anesthesia or intravenous sedation, for 24 hours or while taking prescription Narcotics:  · Limit your activities  · Do not drive and operate hazardous machinery  · Do not make important personal or business decisions  · Do  not drink alcoholic beverages  · If you have not urinated within 8 hours after discharge, please contact your surgeon on call. Report the following to your surgeon:  · Excessive pain, swelling, redness or odor of or around the surgical area  · Temperature over 100.5  · Nausea and vomiting lasting longer than 4 hours or if unable to take medications  · Any signs of decreased circulation or nerve impairment to extremity: change in color, persistent  numbness, tingling, coldness or increase pain  · Any questions    What to do at Home:  Recommended activity: Activity as tolerated. If you experience any of the following symptoms as stated above, please follow up with Dr. Carin Spangler. *  Please give a list of your current medications to your Primary Care Provider. *  Please update this list whenever your medications are discontinued, doses are      changed, or new medications (including over-the-counter products) are added. *  Please carry medication information at all times in case of emergency situations. These are general instructions for a healthy lifestyle:    No smoking/ No tobacco products/ Avoid exposure to second hand smoke  Surgeon General's Warning:  Quitting smoking now greatly reduces serious risk to your health.     Obesity, smoking, and sedentary lifestyle greatly increases your risk for illness    A healthy diet, regular physical exercise & weight monitoring are important for maintaining a healthy lifestyle    You may be retaining fluid if you have a history of heart failure or if you experience any of the following symptoms:  Weight gain of 3 pounds or more overnight or 5 pounds in a week, increased swelling in our hands or feet or shortness of breath while lying flat in bed. Please call your doctor as soon as you notice any of these symptoms; do not wait until your next office visit. The discharge information has been reviewed with the patient and caregiver. The patient and caregiver verbalized understanding. Discharge medications reviewed with the patient and caregiver and appropriate educational materials and side effects teaching were provided. Patient armband removed and shredded.   ___________________________________________________________________________________________________________________________________

## 2022-07-11 NOTE — ANESTHESIA PREPROCEDURE EVALUATION
Relevant Problems   No relevant active problems       Anesthetic History     Increased risk of difficult airway          Review of Systems / Medical History      Pulmonary  Within defined limits                 Neuro/Psych         Psychiatric history     Cardiovascular    Hypertension              Exercise tolerance: >4 METS     GI/Hepatic/Renal     GERD           Endo/Other        Arthritis     Other Findings              Physical Exam    Airway  Mallampati: III  TM Distance: 4 - 6 cm  Neck ROM: normal range of motion   Mouth opening: Normal     Cardiovascular               Dental    Dentition: Edentulous     Pulmonary                 Abdominal         Other Findings            Anesthetic Plan    ASA: 2  Anesthesia type: general          Induction: Intravenous  Anesthetic plan and risks discussed with: Patient

## 2022-07-11 NOTE — ANESTHESIA POSTPROCEDURE EVALUATION
Post-Anesthesia Evaluation and Assessment    Cardiovascular Function/Vital Signs  Visit Vitals  /69   Pulse 88   Temp 36.2 °C (97.2 °F)   Resp 18   Wt 88.9 kg (196 lb)   SpO2 100%   BMI 28.12 kg/m²       Patient is status post Procedure(s):  LEFT HIP CLOSED REDUCTION WITH HANA TABLE, C-ARM, SOPHIA. Nausea/Vomiting: Controlled. Postoperative hydration reviewed and adequate. Pain:  Pain Scale 1: Numeric (0 - 10) (07/11/22 1934)  Pain Intensity 1: 0 (07/11/22 1934)   Managed. Neurological Status:   Neuro (WDL): Exceptions to WDL (07/11/22 1934)   At baseline. Mental Status and Level of Consciousness: Baseline and appropriate for discharge. Pulmonary Status:   O2 Device: None (Room air) (07/11/22 1934)   Adequate oxygenation and airway patent. Complications related to anesthesia: None    Post-anesthesia assessment completed. No concerns. Patient has met all discharge requirements.     Signed By: Elayne Morton MD    July 11, 2022

## 2022-07-11 NOTE — Clinical Note
Patient Class[de-identified] OUTPATIENT [102]   Type of Bed: Surgical [18]   Cardiac Monitoring Required?: No   Admitting Diagnosis: Hip dislocation, left, initial encounter [facility]) [[id number]]   Admitting Physician: Roddy Mcmanus   Attending Physician: Roddy Mcmanus

## 2022-07-11 NOTE — ED PROVIDER NOTES
EMERGENCY DEPARTMENT HISTORY AND PHYSICAL EXAM    Date: 7/11/2022  Patient Name: Carter Cerda    History of Presenting Illness     Chief Complaint   Patient presents with    Hip Pain         History Provided By: Patient and EMS    3:54 PM  Carter Cerda is a 67 y.o. female with PMHX of GERD, hypertension who presents to the emergency department via EMS C/O left hip pain and deformity. Patient states she was bending over to pick something off the floor when her left hip \"popped out. \"  EMS gave 100mcg fentanyl and 4 mg of Zofran. Patient underwent elective left hip replacement on 5/23/2022 by Dr. Marina Joyce. 2 days later it dislocated prompting her to come to this ED and had a revision. Pt denies fall, extremity numbness or weakness, any other injuries, and any other sxs or complaints. PCP: Annika Cates MD    Current Outpatient Medications   Medication Sig Dispense Refill    aspirin delayed-release 81 mg tablet Take 1 Tablet by mouth two (2) times a day. 60 Tablet 0    naloxone (NARCAN) 4 mg/actuation nasal spray Use 1 spray intranasally, then discard. Repeat with new spray every 2 min as needed for opioid overdose symptoms, alternating nostrils. (Patient not taking: Reported on 5/26/2022) 1 Each 0    flavoxate HCl (FLAVOXATE PO) Take  by mouth.  fluvoxaMINE (LUVOX) 50 mg tablet Take 50 mg by mouth nightly. Indications: depression      linaCLOtide (Linzess) 145 mcg cap capsule Take 145 mg by mouth. Indications: IBS      clonazePAM (KlonoPIN) 1 mg tablet Take 1 mg by mouth two (2) times a day.  triamterene-hydroCHLOROthiazide (DYAZIDE) 37.5-25 mg per capsule Take 1 Capsule by mouth daily.  Dexlansoprazole (DEXILANT) 60 mg CpDM Take 1 Cap by mouth daily. Pt instructed to take with a small bit of water on DOS (Patient not taking: Reported on 5/26/2022)      escitalopram (LEXAPRO) 20 mg tablet Take 20 mg by mouth daily.       ARIPiprazole (ABILIFY) 2 mg tablet Take 10 mg by mouth nightly. Indications: depression      amitriptyline (ELAVIL) 100 mg tablet Take 100 mg by mouth two (2) times a day. Indications: helps with sleep         Past History     Past Medical History:  Past Medical History:   Diagnosis Date    Arthritis     osteo    Difficult intubation     esophagus needs to be stretch every 2 years, had done 03/2022    GERD (gastroesophageal reflux disease)     relux, and hiatal hernia    Hypertension     Osteoarthritis of right knee 2/1/2013    Other ill-defined conditions(799.89)     hiatal hernia    Psychiatric disorder     depression    S/P total knee arthroplasty 02/04/2013       Past Surgical History:  Past Surgical History:   Procedure Laterality Date    HX CATARACT REMOVAL      left eye    HX CHOLECYSTECTOMY      laparoscopic    HX COLONOSCOPY      had 2 done, f/u in 10 years    HX HEENT  04/2022    repair detached retena    HX HYSTERECTOMY      TVH    HX KNEE ARTHROSCOPY      right knee replacement    HX OOPHORECTOMY      BSO    HX ORTHOPAEDIC      manipulation of left shoulder under anesthesia       Family History:  History reviewed. No pertinent family history. Social History:  Social History     Tobacco Use    Smoking status: Never Smoker    Smokeless tobacco: Never Used   Substance Use Topics    Alcohol use: No    Drug use: Never       Allergies: Allergies   Allergen Reactions    Neosporin [Benzalkonium Chloride] Other (comments)     Soreness and redness    Other Medication Shortness of Breath and Swelling     Yellow Jacket bee stings         Review of Systems   Review of Systems   Musculoskeletal: Positive for arthralgias and myalgias. Skin: Negative. Neurological: Negative for weakness and numbness. All other systems reviewed and are negative.         Physical Exam     Vitals:    07/11/22 1545   BP: 120/69   Pulse: 74   Resp: 14   Temp: 97.7 °F (36.5 °C)   SpO2: 94%   Weight: 88.9 kg (196 lb)     Physical Exam  Vital signs and nursing notes reviewed. CONSTITUTIONAL: Alert. Well-appearing; well-nourished; in no apparent distress. CV: Normal S1, S2; no murmurs, rubs, or gallops. No chest wall tenderness. RESPIRATORY: Normal chest excursion with respiration; breath sounds clear and equal bilaterally; no wheezes, rhonchi, or rales. EXT: LLE: Left leg is shortened and slightly internally rotated. Sensation intact. 2+ DP pulse. Rest of leg nontender/atraumatic. SKIN: Normal for age and race; warm; dry; good turgor; no apparent lesions or exudate. NEURO: A & O x3. PSYCH:  Mood and affect appropriate. Diagnostic Study Results     Labs -   No results found for this or any previous visit (from the past 12 hour(s)). Radiologic Studies -   XR HIP LT W OR WO PELV 2-3 VWS   Final Result      Dislocated left hip arthroplasty. CT Results  (Last 48 hours)    None        CXR Results  (Last 48 hours)    None          Medications given in the ED-  Medications   morphine injection 2 mg (2 mg IntraVENous Given 7/11/22 1646)         Medical Decision Making   I am the first provider for this patient. I reviewed the vital signs, available nursing notes, past medical history, past surgical history, family history and social history. Vital Signs-Reviewed the patient's vital signs. Records Reviewed: Nursing Notes      Procedures:  Procedures    ED Course:  3:54 PM   Initial assessment performed. The patients presenting problems have been discussed, and they are in agreement with the care plan formulated and outlined with them. I have encouraged them to ask questions as they arise throughout their visit. 4:17 PM consult note  Case discussed with pt's orthopedist Dr. Sonido Gill, who will come see patient in the ED and then determine how to reduce hip dislocation. 4:50 PM progress note  Orthopedist Dr. Sonido Gill is in the ED to evaluate patient.     5 PM progress note  Dr. Sonido Gill will take patient to the OR for closed reduction, possible revision and admit to his service. Diagnosis and Disposition       ADMISSION NOTE:  5:09 PM  Patient is being admitted to the hospital by Dr. Lucas Mcmillan. The results of their tests and reasons for their admission have been discussed with them and/or available family. They convey agreement and understanding for the need to be admitted and for their admission diagnosis. CONDITIONS ON ADMISSION:  Deep Vein Thrombosis is not present at the time of admission. Thrombosis is not present at the time of admission. Urinary Tract Infection is not present at the time of admission. Pneumonia is not present at the time of admission. MRSA is not present at the time of admission. Wound infection is not present at the time of admission. Pressure Ulcer is not present at the time of admission. CLINICAL IMPRESSION:    1. Closed dislocation of left hip, initial encounter ([facility] [address]    2. History of left hip replacement        PLAN:  1. Admit to OR    _______________________________      Please note that this dictation was completed with [employer], the computer voice recognition software. Quite often unanticipated grammatical, syntax, homophones, and other interpretive errors are inadvertently transcribed by the computer software. Please disregard these errors. Please excuse any errors that have escaped final proofreading.

## 2022-07-11 NOTE — OP NOTES
Operative Note    Patient: Vicente Alexander  YOB: 1950  MRN: [id number]    Date of Procedure: 7/11/2022     Pre-Op Diagnosis: LEFT HIP DISLOCATION    Post-Op Diagnosis: Same as preoperative diagnosis. Procedure(s):  LEFT HIP CLOSED REDUCTION WITH HANA TABLE, C-ARM, SOPHIA    Surgeon(s):  Abran Mckinney MD    Surgical Assistant: Surg Asst-1: Soledad Scanlon    Anesthesia: General     Estimated Blood Loss (mL):  Minimal    Complications: None    Specimens: * No specimens in log *     Implants: * No implants in log *    Drains: * No LDAs found *    Findings: Dislocated LAURA, stable after reduction. Detailed Description of Procedure:   After general anesthesia pt moved on to the HANA table. Initial posn noted on image intensification. Reduction moved the hip inferior and then it was resting on the posterior edge of the acetabulum. Hip reduced with txn, hip flexion, internal then external rotation. Hip stable in extension and er. Hip also stable at 110 degrees of flexion. With good stability open revision was not done as had been discussed with the patient pre op. Pt had not voided in the 4 hrs pre op and a straight cath was done prior to leaving the OR.       Electronically Signed by Magnolia Peck MD on 7/11/2022 at 7:07 PM

## 2022-07-26 ENCOUNTER — APPOINTMENT (OUTPATIENT)
Dept: GENERAL RADIOLOGY | Age: 72
DRG: 561 | End: 2022-07-26
Attending: EMERGENCY MEDICINE
Payer: MEDICARE

## 2022-07-26 ENCOUNTER — APPOINTMENT (OUTPATIENT)
Dept: GENERAL RADIOLOGY | Age: 72
DRG: 561 | End: 2022-07-26
Attending: ORTHOPAEDIC SURGERY
Payer: MEDICARE

## 2022-07-26 ENCOUNTER — HOSPITAL ENCOUNTER (INPATIENT)
Age: 72
LOS: 1 days | Discharge: HOME HEALTH | DRG: 561 | End: 2022-07-27
Attending: EMERGENCY MEDICINE | Admitting: ORTHOPAEDIC SURGERY
Payer: MEDICARE

## 2022-07-26 ENCOUNTER — ANESTHESIA (OUTPATIENT)
Dept: SURGERY | Age: 72
DRG: 561 | End: 2022-07-26
Payer: MEDICARE

## 2022-07-26 ENCOUNTER — ANESTHESIA EVENT (OUTPATIENT)
Dept: SURGERY | Age: 72
DRG: 561 | End: 2022-07-26
Payer: MEDICARE

## 2022-07-26 DIAGNOSIS — S73.005A CLOSED DISLOCATION OF LEFT HIP, INITIAL ENCOUNTER (HCC): Primary | ICD-10-CM

## 2022-07-26 LAB
ANION GAP SERPL CALC-SCNC: 7 MMOL/L (ref 3–18)
BASOPHILS # BLD: 0 K/UL (ref 0–0.1)
BASOPHILS NFR BLD: 0 % (ref 0–2)
BUN SERPL-MCNC: 20 MG/DL (ref 7–18)
BUN/CREAT SERPL: 17 (ref 12–20)
CALCIUM SERPL-MCNC: 8.6 MG/DL (ref 8.5–10.1)
CHLORIDE SERPL-SCNC: 99 MMOL/L (ref 100–111)
CO2 SERPL-SCNC: 30 MMOL/L (ref 21–32)
CREAT SERPL-MCNC: 1.18 MG/DL (ref 0.6–1.3)
DIFFERENTIAL METHOD BLD: ABNORMAL
EOSINOPHIL # BLD: 0.1 K/UL (ref 0–0.4)
EOSINOPHIL NFR BLD: 2 % (ref 0–5)
ERYTHROCYTE [DISTWIDTH] IN BLOOD BY AUTOMATED COUNT: 12.4 % (ref 11.6–14.5)
GLUCOSE SERPL-MCNC: 122 MG/DL (ref 74–99)
HCT VFR BLD AUTO: 32.5 % (ref 35–45)
HGB BLD-MCNC: 10.4 G/DL (ref 12–16)
IMM GRANULOCYTES # BLD AUTO: 0 K/UL (ref 0–0.04)
IMM GRANULOCYTES NFR BLD AUTO: 0 % (ref 0–0.5)
LYMPHOCYTES # BLD: 1.1 K/UL (ref 0.9–3.6)
LYMPHOCYTES NFR BLD: 18 % (ref 21–52)
MCH RBC QN AUTO: 29.5 PG (ref 24–34)
MCHC RBC AUTO-ENTMCNC: 32 G/DL (ref 31–37)
MCV RBC AUTO: 92.3 FL (ref 78–100)
MONOCYTES # BLD: 0.5 K/UL (ref 0.05–1.2)
MONOCYTES NFR BLD: 7 % (ref 3–10)
NEUTS SEG # BLD: 4.4 K/UL (ref 1.8–8)
NEUTS SEG NFR BLD: 72 % (ref 40–73)
NRBC # BLD: 0 K/UL (ref 0–0.01)
NRBC BLD-RTO: 0 PER 100 WBC
PLATELET # BLD AUTO: 243 K/UL (ref 135–420)
PMV BLD AUTO: 8.9 FL (ref 9.2–11.8)
POTASSIUM SERPL-SCNC: 3.2 MMOL/L (ref 3.5–5.5)
RBC # BLD AUTO: 3.52 M/UL (ref 4.2–5.3)
SODIUM SERPL-SCNC: 136 MMOL/L (ref 136–145)
WBC # BLD AUTO: 6.1 K/UL (ref 4.6–13.2)

## 2022-07-26 PROCEDURE — 65270000029 HC RM PRIVATE

## 2022-07-26 PROCEDURE — 74011000250 HC RX REV CODE- 250

## 2022-07-26 PROCEDURE — 74011250636 HC RX REV CODE- 250/636: Performed by: ORTHOPAEDIC SURGERY

## 2022-07-26 PROCEDURE — 74011000250 HC RX REV CODE- 250: Performed by: PHYSICIAN ASSISTANT

## 2022-07-26 PROCEDURE — 76210000063 HC OR PH I REC FIRST 0.5 HR: Performed by: ORTHOPAEDIC SURGERY

## 2022-07-26 PROCEDURE — 77030020782 HC GWN BAIR PAWS FLX 3M -B: Performed by: ORTHOPAEDIC SURGERY

## 2022-07-26 PROCEDURE — 74011250637 HC RX REV CODE- 250/637: Performed by: PHYSICIAN ASSISTANT

## 2022-07-26 PROCEDURE — 99285 EMERGENCY DEPT VISIT HI MDM: CPT

## 2022-07-26 PROCEDURE — 77030018846 HC SOL IRR STRL H20 ICUM -A: Performed by: ORTHOPAEDIC SURGERY

## 2022-07-26 PROCEDURE — 85025 COMPLETE CBC W/AUTO DIFF WBC: CPT

## 2022-07-26 PROCEDURE — 76010000138 HC OR TIME 0.5 TO 1 HR: Performed by: ORTHOPAEDIC SURGERY

## 2022-07-26 PROCEDURE — 0QS7XZZ REPOSITION LEFT UPPER FEMUR, EXTERNAL APPROACH: ICD-10-PCS | Performed by: ORTHOPAEDIC SURGERY

## 2022-07-26 PROCEDURE — 72170 X-RAY EXAM OF PELVIS: CPT

## 2022-07-26 PROCEDURE — 96374 THER/PROPH/DIAG INJ IV PUSH: CPT

## 2022-07-26 PROCEDURE — 74011250636 HC RX REV CODE- 250/636

## 2022-07-26 PROCEDURE — 74011250636 HC RX REV CODE- 250/636: Performed by: PHYSICIAN ASSISTANT

## 2022-07-26 PROCEDURE — 80048 BASIC METABOLIC PNL TOTAL CA: CPT

## 2022-07-26 PROCEDURE — 77030040361 HC SLV COMPR DVT MDII -B: Performed by: ORTHOPAEDIC SURGERY

## 2022-07-26 PROCEDURE — 76060000032 HC ANESTHESIA 0.5 TO 1 HR: Performed by: ORTHOPAEDIC SURGERY

## 2022-07-26 PROCEDURE — 2709999900 HC NON-CHARGEABLE SUPPLY: Performed by: ORTHOPAEDIC SURGERY

## 2022-07-26 PROCEDURE — 74011250636 HC RX REV CODE- 250/636: Performed by: EMERGENCY MEDICINE

## 2022-07-26 RX ORDER — SODIUM CHLORIDE 0.9 % (FLUSH) 0.9 %
5-40 SYRINGE (ML) INJECTION AS NEEDED
Status: DISCONTINUED | OUTPATIENT
Start: 2022-07-26 | End: 2022-07-27 | Stop reason: HOSPADM

## 2022-07-26 RX ORDER — SODIUM CHLORIDE 0.9 % (FLUSH) 0.9 %
5-40 SYRINGE (ML) INJECTION EVERY 8 HOURS
Status: DISCONTINUED | OUTPATIENT
Start: 2022-07-26 | End: 2022-07-27 | Stop reason: HOSPADM

## 2022-07-26 RX ORDER — ONDANSETRON 2 MG/ML
INJECTION INTRAMUSCULAR; INTRAVENOUS AS NEEDED
Status: DISCONTINUED | OUTPATIENT
Start: 2022-07-26 | End: 2022-07-26 | Stop reason: HOSPADM

## 2022-07-26 RX ORDER — FLUVOXAMINE MALEATE 50 MG/1
50 TABLET ORAL
Status: DISCONTINUED | OUTPATIENT
Start: 2022-07-26 | End: 2022-07-27 | Stop reason: HOSPADM

## 2022-07-26 RX ORDER — MORPHINE SULFATE 4 MG/ML
4 INJECTION INTRAVENOUS
Status: COMPLETED | OUTPATIENT
Start: 2022-07-26 | End: 2022-07-26

## 2022-07-26 RX ORDER — SODIUM CHLORIDE, SODIUM LACTATE, POTASSIUM CHLORIDE, CALCIUM CHLORIDE 600; 310; 30; 20 MG/100ML; MG/100ML; MG/100ML; MG/100ML
75 INJECTION, SOLUTION INTRAVENOUS CONTINUOUS
Status: DISCONTINUED | OUTPATIENT
Start: 2022-07-26 | End: 2022-07-27 | Stop reason: HOSPADM

## 2022-07-26 RX ORDER — SODIUM CHLORIDE, SODIUM LACTATE, POTASSIUM CHLORIDE, CALCIUM CHLORIDE 600; 310; 30; 20 MG/100ML; MG/100ML; MG/100ML; MG/100ML
125 INJECTION, SOLUTION INTRAVENOUS CONTINUOUS
Status: DISCONTINUED | OUTPATIENT
Start: 2022-07-26 | End: 2022-07-27 | Stop reason: HOSPADM

## 2022-07-26 RX ORDER — OXYCODONE AND ACETAMINOPHEN 5; 325 MG/1; MG/1
1 TABLET ORAL
Status: DISCONTINUED | OUTPATIENT
Start: 2022-07-26 | End: 2022-07-27 | Stop reason: HOSPADM

## 2022-07-26 RX ORDER — LIDOCAINE HYDROCHLORIDE 20 MG/ML
INJECTION, SOLUTION EPIDURAL; INFILTRATION; INTRACAUDAL; PERINEURAL AS NEEDED
Status: DISCONTINUED | OUTPATIENT
Start: 2022-07-26 | End: 2022-07-26 | Stop reason: HOSPADM

## 2022-07-26 RX ORDER — CLONAZEPAM 0.5 MG/1
1 TABLET ORAL 2 TIMES DAILY
Status: DISCONTINUED | OUTPATIENT
Start: 2022-07-26 | End: 2022-07-27 | Stop reason: HOSPADM

## 2022-07-26 RX ORDER — MIDAZOLAM HYDROCHLORIDE 1 MG/ML
INJECTION, SOLUTION INTRAMUSCULAR; INTRAVENOUS AS NEEDED
Status: DISCONTINUED | OUTPATIENT
Start: 2022-07-26 | End: 2022-07-26 | Stop reason: HOSPADM

## 2022-07-26 RX ORDER — ASPIRIN 81 MG/1
81 TABLET ORAL 2 TIMES DAILY
Status: DISCONTINUED | OUTPATIENT
Start: 2022-07-26 | End: 2022-07-27 | Stop reason: HOSPADM

## 2022-07-26 RX ORDER — EPHEDRINE SULFATE/0.9% NACL/PF 50 MG/5 ML
SYRINGE (ML) INTRAVENOUS AS NEEDED
Status: DISCONTINUED | OUTPATIENT
Start: 2022-07-26 | End: 2022-07-26 | Stop reason: HOSPADM

## 2022-07-26 RX ORDER — TRIAMTERENE/HYDROCHLOROTHIAZID 37.5-25 MG
1 TABLET ORAL DAILY
Status: DISCONTINUED | OUTPATIENT
Start: 2022-07-27 | End: 2022-07-27 | Stop reason: HOSPADM

## 2022-07-26 RX ORDER — TRIAMTERENE AND HYDROCHLOROTHIAZIDE 37.5; 25 MG/1; MG/1
1 CAPSULE ORAL DAILY
Status: DISCONTINUED | OUTPATIENT
Start: 2022-07-27 | End: 2022-07-26 | Stop reason: SDUPTHER

## 2022-07-26 RX ORDER — DEXAMETHASONE SODIUM PHOSPHATE 4 MG/ML
INJECTION, SOLUTION INTRA-ARTICULAR; INTRALESIONAL; INTRAMUSCULAR; INTRAVENOUS; SOFT TISSUE AS NEEDED
Status: DISCONTINUED | OUTPATIENT
Start: 2022-07-26 | End: 2022-07-26 | Stop reason: HOSPADM

## 2022-07-26 RX ORDER — ESCITALOPRAM OXALATE 10 MG/1
20 TABLET ORAL DAILY
Status: DISCONTINUED | OUTPATIENT
Start: 2022-07-27 | End: 2022-07-27 | Stop reason: HOSPADM

## 2022-07-26 RX ORDER — AMITRIPTYLINE HYDROCHLORIDE 50 MG/1
100 TABLET, FILM COATED ORAL 2 TIMES DAILY
Status: DISCONTINUED | OUTPATIENT
Start: 2022-07-26 | End: 2022-07-27 | Stop reason: HOSPADM

## 2022-07-26 RX ORDER — ROCURONIUM BROMIDE 10 MG/ML
INJECTION, SOLUTION INTRAVENOUS AS NEEDED
Status: DISCONTINUED | OUTPATIENT
Start: 2022-07-26 | End: 2022-07-26 | Stop reason: HOSPADM

## 2022-07-26 RX ORDER — PROPOFOL 10 MG/ML
INJECTION, EMULSION INTRAVENOUS AS NEEDED
Status: DISCONTINUED | OUTPATIENT
Start: 2022-07-26 | End: 2022-07-26 | Stop reason: HOSPADM

## 2022-07-26 RX ADMIN — SODIUM CHLORIDE, PRESERVATIVE FREE 10 ML: 5 INJECTION INTRAVENOUS at 21:23

## 2022-07-26 RX ADMIN — ROCURONIUM BROMIDE 30 MG: 10 INJECTION, SOLUTION INTRAVENOUS at 18:53

## 2022-07-26 RX ADMIN — ONDANSETRON HYDROCHLORIDE 4 MG: 2 INJECTION INTRAMUSCULAR; INTRAVENOUS at 18:46

## 2022-07-26 RX ADMIN — MORPHINE SULFATE 4 MG: 4 INJECTION INTRAVENOUS at 10:36

## 2022-07-26 RX ADMIN — DEXAMETHASONE SODIUM PHOSPHATE 4 MG: 4 INJECTION, SOLUTION INTRAMUSCULAR; INTRAVENOUS at 18:46

## 2022-07-26 RX ADMIN — MIDAZOLAM 2 MG: 1 INJECTION INTRAMUSCULAR; INTRAVENOUS at 18:27

## 2022-07-26 RX ADMIN — PROPOFOL 150 MG: 10 INJECTION, EMULSION INTRAVENOUS at 18:34

## 2022-07-26 RX ADMIN — Medication 10 MG: at 18:39

## 2022-07-26 RX ADMIN — SODIUM CHLORIDE, SODIUM LACTATE, POTASSIUM CHLORIDE, AND CALCIUM CHLORIDE 125 ML/HR: 600; 310; 30; 20 INJECTION, SOLUTION INTRAVENOUS at 17:09

## 2022-07-26 RX ADMIN — FLUVOXAMINE MALEATE 50 MG: 50 TABLET, COATED ORAL at 21:21

## 2022-07-26 RX ADMIN — ASPIRIN 81 MG: 81 TABLET, COATED ORAL at 21:21

## 2022-07-26 RX ADMIN — SODIUM CHLORIDE, POTASSIUM CHLORIDE, SODIUM LACTATE AND CALCIUM CHLORIDE 75 ML/HR: 600; 310; 30; 20 INJECTION, SOLUTION INTRAVENOUS at 23:40

## 2022-07-26 RX ADMIN — SUGAMMADEX 360 MG: 100 INJECTION, SOLUTION INTRAVENOUS at 19:00

## 2022-07-26 RX ADMIN — AMITRIPTYLINE HYDROCHLORIDE 100 MG: 50 TABLET, FILM COATED ORAL at 21:21

## 2022-07-26 RX ADMIN — LIDOCAINE HYDROCHLORIDE 100 MG: 20 INJECTION, SOLUTION INTRAVENOUS at 18:34

## 2022-07-26 RX ADMIN — OXYCODONE HYDROCHLORIDE AND ACETAMINOPHEN 1 TABLET: 5; 325 TABLET ORAL at 21:20

## 2022-07-26 RX ADMIN — CLONAZEPAM 1 MG: 0.5 TABLET ORAL at 21:21

## 2022-07-26 NOTE — ED PROVIDER NOTES
EMERGENCY DEPARTMENT HISTORY AND PHYSICAL EXAM    Date: 7/26/2022  Patient Name: Collette Deng    History of Presenting Illness     Chief Complaint   Patient presents with    Hip Pain     Per the patient, she was putting her make up on while on the toilet (with the seat down), she states her hip popped \"out\" while sitting. She had her hip replaced in May here. History Provided By: Patient, Patient's Father, and EMS    8:22 AM  Collette Deng is a 67 y.o. female with PMHX of arthritis, left hip replacement on May 23 of this year complicated by multiple dislocations who presents to the emergency department C/O left hip pain. Patient reports she was sitting putting on her make-up when she felt her hip pop out of place. She reports this is the third time it has occurred since it was replaced in May. She denies any falls, fever, chest pain, shortness of breath, head strike. She does not take any blood thinners. She states she cannot move the hip at all since the event took place. Pain worse with movement. No other relieving or exacerbating factors identified. PCP: Karla Chavarria MD    Current Outpatient Medications   Medication Sig Dispense Refill    aspirin delayed-release 81 mg tablet Take 1 Tablet by mouth two (2) times a day. 60 Tablet 0    naloxone (NARCAN) 4 mg/actuation nasal spray Use 1 spray intranasally, then discard. Repeat with new spray every 2 min as needed for opioid overdose symptoms, alternating nostrils. (Patient not taking: Reported on 5/26/2022) 1 Each 0    flavoxate HCl (FLAVOXATE PO) Take  by mouth. fluvoxaMINE (LUVOX) 50 mg tablet Take 50 mg by mouth nightly. Indications: depression      linaCLOtide (Linzess) 145 mcg cap capsule Take 145 mg by mouth. Indications: IBS      clonazePAM (KlonoPIN) 1 mg tablet Take 1 mg by mouth two (2) times a day. triamterene-hydroCHLOROthiazide (DYAZIDE) 37.5-25 mg per capsule Take 1 Capsule by mouth daily.       Dexlansoprazole (DEXILANT) 60 mg CpDM Take 1 Cap by mouth daily. Pt instructed to take with a small bit of water on DOS (Patient not taking: Reported on 5/26/2022)      escitalopram (LEXAPRO) 20 mg tablet Take 20 mg by mouth daily. ARIPiprazole (ABILIFY) 2 mg tablet Take 10 mg by mouth nightly. Indications: depression      amitriptyline (ELAVIL) 100 mg tablet Take 100 mg by mouth two (2) times a day. Indications: helps with sleep         Past History       Past Medical History:  Past Medical History:   Diagnosis Date    Arthritis     osteo    Difficult intubation     esophagus needs to be stretch every 2 years, had done 03/2022    GERD (gastroesophageal reflux disease)     relux, and hiatal hernia    Hypertension     Osteoarthritis of right knee 2/1/2013    Other ill-defined conditions(799.89)     hiatal hernia    Psychiatric disorder     depression    S/P total knee arthroplasty 02/04/2013       Past Surgical History:  Past Surgical History:   Procedure Laterality Date    HX CATARACT REMOVAL      left eye    HX CHOLECYSTECTOMY      laparoscopic    HX COLONOSCOPY      had 2 done, f/u in 10 years    HX HEENT  04/2022    repair detached retena    HX HYSTERECTOMY      TVH    HX KNEE ARTHROSCOPY      right knee replacement    HX OOPHORECTOMY      BSO    HX ORTHOPAEDIC      manipulation of left shoulder under anesthesia       Family History:  History reviewed. No pertinent family history. Social History:  Social History     Tobacco Use    Smoking status: Never    Smokeless tobacco: Never   Substance Use Topics    Alcohol use: No    Drug use: Never       Allergies: Allergies   Allergen Reactions    Neosporin [Benzalkonium Chloride] Other (comments)     Soreness and redness    Other Medication Shortness of Breath and Swelling     Yellow Jacket bee stings         Review of Systems   Review of Systems   Constitutional:  Negative for fever. Respiratory:  Negative for shortness of breath.     Cardiovascular:  Negative for chest pain.   Gastrointestinal:  Negative for abdominal pain. Musculoskeletal:  Positive for arthralgias. All other systems reviewed and are negative. Physical Exam     Vitals:    07/26/22 0811 07/26/22 1039   BP: 106/67 115/73   Pulse: 79 80   Resp: 12    Temp: 98.4 °F (36.9 °C)    SpO2: 92% 94%   Weight: 88.9 kg (195 lb 15.8 oz)      Physical Exam    Nursing notes and vital signs reviewed    Constitutional: Non toxic appearing, elderly, moderate distress  Head: Normocephalic, Atraumatic  Eyes: EOMI  Neck: Supple  Cardiovascular: Regular rate and rhythm, no murmurs, rubs, or gallops  Chest: Normal work of breathing and chest excursion bilaterally  Lungs: Clear to ausculation bilaterally  Abdomen: Soft, non tender, non distended  Back: No evidence of trauma or deformity  Extremities: Patient unable to move left leg and has pain with any passive movement of the leg, distal neurovascular intact  Skin: Warm and dry, normal cap refill  Neuro: Alert and appropriate  Psychiatric: Normal mood and affect      Diagnostic Study Results     Labs -     Recent Results (from the past 12 hour(s))   CBC WITH AUTOMATED DIFF    Collection Time: 07/26/22  8:30 AM   Result Value Ref Range    WBC 6.1 4.6 - 13.2 K/uL    RBC 3.52 (L) 4.20 - 5.30 M/uL    HGB 10.4 (L) 12.0 - 16.0 g/dL    HCT 32.5 (L) 35.0 - 45.0 %    MCV 92.3 78.0 - 100.0 FL    MCH 29.5 24.0 - 34.0 PG    MCHC 32.0 31.0 - 37.0 g/dL    RDW 12.4 11.6 - 14.5 %    PLATELET 922 784 - 303 K/uL    MPV 8.9 (L) 9.2 - 11.8 FL    NRBC 0.0 0  WBC    ABSOLUTE NRBC 0.00 0.00 - 0.01 K/uL    NEUTROPHILS 72 40 - 73 %    LYMPHOCYTES 18 (L) 21 - 52 %    MONOCYTES 7 3 - 10 %    EOSINOPHILS 2 0 - 5 %    BASOPHILS 0 0 - 2 %    IMMATURE GRANULOCYTES 0 0.0 - 0.5 %    ABS. NEUTROPHILS 4.4 1.8 - 8.0 K/UL    ABS. LYMPHOCYTES 1.1 0.9 - 3.6 K/UL    ABS. MONOCYTES 0.5 0.05 - 1.2 K/UL    ABS. EOSINOPHILS 0.1 0.0 - 0.4 K/UL    ABS. BASOPHILS 0.0 0.0 - 0.1 K/UL    ABS. IMM.  GRANS. 0.0 0.00 - 0.04 K/UL    DF AUTOMATED     METABOLIC PANEL, BASIC    Collection Time: 07/26/22  8:30 AM   Result Value Ref Range    Sodium 136 136 - 145 mmol/L    Potassium 3.2 (L) 3.5 - 5.5 mmol/L    Chloride 99 (L) 100 - 111 mmol/L    CO2 30 21 - 32 mmol/L    Anion gap 7 3.0 - 18 mmol/L    Glucose 122 (H) 74 - 99 mg/dL    BUN 20 (H) 7.0 - 18 MG/DL    Creatinine 1.18 0.6 - 1.3 MG/DL    BUN/Creatinine ratio 17 12 - 20      GFR est AA 54 (L) >60 ml/min/1.73m2    GFR est non-AA 45 (L) >60 ml/min/1.73m2    Calcium 8.6 8.5 - 10.1 MG/DL       Radiologic Studies -   XR PELV 1 OR 2 V   Final Result      Dislocated left hip arthroplasty. CT Results  (Last 48 hours)      None          CXR Results  (Last 48 hours)      None            Medications given in the ED-  Medications   morphine injection 4 mg (4 mg IntraVENous Given 7/26/22 1036)         Medical Decision Making   I am the first provider for this patient. I reviewed the vital signs, available nursing notes, past medical history, past surgical history, family history and social history. Vital Signs-Reviewed the patient's vital signs. Pulse Oximetry Analysis - 94% on room air, not hypoxic     Records Reviewed: Nursing Notes and Old Medical Records    Provider Notes (Medical Decision Making): Yesica Linares is a 67 y.o. 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 Hemodynamically stable and neurovascular intact. Discussed with her orthopedic surgeon who plans to take patient to the OR for reduction due to the recurrent nature of this prosthetic dislocation. Patient understands and agrees with this plan. Procedures:  Procedures    ED Course:   CONSULT NOTE:   11:23 AM  Dr. Carmelo Parekh spoke with Dr. Jey Barr   Specialty: Orthopedics  Discussed pt's hx, disposition, and available diagnostic and imaging results over the telephone. Reviewed care plans. Will talk to patient about options. 12:01 PM  Updated patient on all results and plan.   All questions answered. Patient reports that Dr. Devon Contreras plans to take to OR to reduce. Diagnosis and Disposition     Critical Care Time: None    Core Measures:  For Hospitalized Patients:    1. Hospitalization Decision Time:  The decision to hospitalize the patient was made by Dr. Herman Pace at 12:01 PM on 7/26/2022    2. Aspirin: Aspirin was not given because the patient did not present with a stroke at the time of their Emergency Department evaluation    12:02 PM  Patient is being admitted to the hospital by Dr. Juan Antonio Anne. The results of their tests and reasons for their admission have been discussed with them and/or available family. They convey agreement and understanding for the need to be admitted and for their admission diagnosis. CONDITIONS ON ADMISSION:  Sepsis is not present at the time of admission. Deep Vein Thrombosis is not present at the time of admission. Thrombosis is not present at the time of admission. Urinary Tract Infection is not present at the time of admission. Pneumonia is not present at the time of admission. MRSA is not present at the time of admission. Wound infection is not present at the time of admission. Pressure Ulcer is not present at the time of admission. CLINICAL IMPRESSION:    1. Closed dislocation of left hip, initial encounter ([facility] [address]         _______________________________      Please note that this dictation was completed with [employer], the computer voice recognition software. Quite often unanticipated grammatical, syntax, homophones, and other interpretive errors are inadvertently transcribed by the computer software. Please disregard these errors. Please excuse any errors that have escaped final proofreading.

## 2022-07-26 NOTE — ED NOTES
TRANSFER - OUT REPORT:    Verbal report given to Sepideh RN (name) on Kiki Arnold  being transferred to surg (unit) for routine progression of care       Report consisted of patients Situation, Background, Assessment and   Recommendations(SBAR). Information from the following report(s) SBAR, ED Summary, Procedure Summary and MAR was reviewed with the receiving nurse. Lines:   Peripheral IV 07/26/22 Anterior;Left;Proximal Forearm (Active)   Dressing Status Clean, dry, & intact 07/26/22 0813        Opportunity for questions and clarification was provided.       Patient transported with:   [place]

## 2022-07-26 NOTE — ANESTHESIA PREPROCEDURE EVALUATION
Relevant Problems   No relevant active problems       Anesthetic History     Increased risk of difficult airway          Review of Systems / Medical History  Patient summary reviewed, nursing notes reviewed and pertinent labs reviewed    Pulmonary  Within defined limits                 Neuro/Psych         Psychiatric history     Cardiovascular    Hypertension              Exercise tolerance: >4 METS     GI/Hepatic/Renal     GERD           Endo/Other        Arthritis     Other Findings              Physical Exam    Airway  Mallampati: II  TM Distance: 4 - 6 cm  Neck ROM: normal range of motion   Mouth opening: Normal     Cardiovascular  Regular rate and rhythm,  S1 and S2 normal,  no murmur, click, rub, or gallop  Rhythm: regular  Rate: normal         Dental  No notable dental hx       Pulmonary  Breath sounds clear to auscultation               Abdominal  GI exam deferred       Other Findings            Anesthetic Plan    ASA: 2  Anesthesia type: general          Induction: Intravenous  Anesthetic plan and risks discussed with: Patient

## 2022-07-26 NOTE — ANESTHESIA POSTPROCEDURE EVALUATION
Post-Anesthesia Evaluation and Assessment    Cardiovascular Function/Vital Signs  Visit Vitals  /67   Pulse 77   Temp 36.1 °C (97 °F)   Resp 13   Wt 88.9 kg (195 lb 15.8 oz)   SpO2 100%   Breastfeeding No   BMI 28.12 kg/m²       Patient is status post Procedure(s):  LEFT CLOSED REDUCTION HIP WITH HANA TABLE, C-ARM. Nausea/Vomiting: Controlled. Postoperative hydration reviewed and adequate. Pain:  Pain Scale 1: Numeric (0 - 10) (07/26/22 1913)  Pain Intensity 1: 0 (07/26/22 1913)   Managed. Neurological Status:   Neuro (WDL): Exceptions to WDL (07/26/22 1913)   At baseline. Mental Status and Level of Consciousness: Arousable. Pulmonary Status:   O2 Device: Nasal cannula (07/26/22 1915)   Adequate oxygenation and airway patent. Complications related to anesthesia: None    Patient has met all discharge requirements.     Signed By: Keyon Cantu MD    July 26, 2022

## 2022-07-26 NOTE — Clinical Note
Status[de-identified] INPATIENT [101]   Type of Bed: Surgical [18]   Cardiac Monitoring Required?: No   Inpatient Hospitalization Certified Necessary for the Following Reasons: 3.  Patient receiving treatment that can only be provided in an inpatient setting (further clarification in H&P documentation)   Admitting Diagnosis: Hip dislocation, left, initial encounter [facility]) [[id number]]   Admitting Physician: Kristine Raines   Attending Physician: Kristine Raines   Estimated Length of Stay: 2 Midnights   Discharge Plan[de-identified] Home with Office Follow-up

## 2022-07-26 NOTE — PROGRESS NOTES
Cm met with pt at bedside to introduce self and explained cm role as d/c planner, pt aware of cm role due to past hospitalizations, spouse at bedside. CM did verify all information on facesheet at this time, if home health is recommended at discharge pt would like to use [facility], at this time pt still needs to be seen by ortho, cm will cont to follow case.

## 2022-07-26 NOTE — PROGRESS NOTES
1335  VSS. Pt transferred from stretcher to bed.     1341  Assumed care of pt at this time. Assessment complete. Pt alert and oriented x 4. Shows no sign of distress. Fall risk arm band in place. Denies SOB and chest pain. Pt lungs clear bilaterally. Cap refill  less than 3 seconds. Pt denies numbness and tingling to all extremities. Stated pain 6/10. Pt has 20 G IV to L forearm. Pt has no dressing . Skin intact. Scar to L hip from previous surgery back in may 2022. Incentive spirometer at bedside can reach 2500. Pt encouraged to continue use of IS. Pt verbalized understanding. Call light and possessions within reach. Bed locked and in low position. Will continue to monitor. Plan for closed hip reduction possibly today at 5pm.    1358  Nurse informed Dr Buster Real office that pt has arrived to room 201 and needs orders. Awaiting return call     1459  OR informed nurse that pt will be picked up to transfer to pre op at 4pm.    1520  CHG wipes and nozin swabs completed. Pt has been NPO. Gown and linen changed. Pt has cellphone and removed jewelry and dentures which will be left with . Callbell and possessions within reach    [address] REPORT:    Verbal report given to TERRENCE Torres RN(name) on David Ocampo  being transferred to Pre op holding(unit) for ordered procedure       Report consisted of patients Situation, Background, Assessment and   Recommendations(SBAR). Information from the following report(s) SBAR, Kardex, Intake/Output, MAR, and Recent Results was reviewed with the receiving nurse.     Lines:   Peripheral IV 07/26/22 Anterior;Left;Proximal Forearm (Active)   Site Assessment Clean, dry, & intact 07/26/22 1341   Phlebitis Assessment 0 07/26/22 1341   Infiltration Assessment 0 07/26/22 1341   Dressing Status Clean, dry, & intact 07/26/22 1341   Dressing Type Tape;Transparent 07/26/22 1341   Hub Color/Line Status Pink;Capped 07/26/22 1341   Action Taken Open ports on tubing capped 07/26/22 1341   Alcohol Cap Used Yes 07/26/22 1341        Opportunity for questions and clarification was provided. Patient transported with:   Tech and IV fluids. 1633  Pt being transported to pre op holding at this time.

## 2022-07-26 NOTE — ED TRIAGE NOTES
Per the patient, she was putting her make up on while on the toilet (with the seat down), she states her hip popped \"out\" while sitting. She had her hip replaced in May here.

## 2022-07-26 NOTE — ROUTINE PROCESS
Bedside and Verbal shift change report given to [address] [place] [address] B (oncoming nurse) by Lexis Solano RN (offgoing nurse). Report included the following information SBAR, Kardex, MAR and Recent Results. Pt off floor in the OR.  Will return to room 201

## 2022-07-26 NOTE — ED PROVIDER NOTES
EMERGENCY DEPARTMENT HISTORY AND PHYSICAL EXAM    Date: 7/26/2022  Patient Name: Geraldo Cervantes    History of Presenting Illness     Chief Complaint   Patient presents with    Hip Pain     Per the patient, she was putting her make up on while on the toilet (with the seat down), she states her hip popped \"out\" while sitting. She had her hip replaced in May here. History Provided By: Patient, Patient's Father, and EMS    8:22 AM  Geraldo Cervantes is a 67 y.o. female with PMHX of arthritis, left hip replacement on May 23 of this year complicated by multiple dislocations who presents to the emergency department C/O left hip pain. Patient reports she was sitting putting on her make-up when she felt her hip pop out of place. She reports this is the third time it has occurred since it was replaced in May. She denies any falls, fever, chest pain, shortness of breath, head strike. She does not take any blood thinners. She states she cannot move the hip at all since the event took place. Pain worse with movement. No other relieving or exacerbating factors identified. PCP: Cielo Ford MD    Current Outpatient Medications   Medication Sig Dispense Refill    aspirin delayed-release 81 mg tablet Take 1 Tablet by mouth two (2) times a day. 60 Tablet 0    naloxone (NARCAN) 4 mg/actuation nasal spray Use 1 spray intranasally, then discard. Repeat with new spray every 2 min as needed for opioid overdose symptoms, alternating nostrils. (Patient not taking: Reported on 5/26/2022) 1 Each 0    flavoxate HCl (FLAVOXATE PO) Take  by mouth. fluvoxaMINE (LUVOX) 50 mg tablet Take 50 mg by mouth nightly. Indications: depression      linaCLOtide (Linzess) 145 mcg cap capsule Take 145 mg by mouth. Indications: IBS      clonazePAM (KlonoPIN) 1 mg tablet Take 1 mg by mouth two (2) times a day. triamterene-hydroCHLOROthiazide (DYAZIDE) 37.5-25 mg per capsule Take 1 Capsule by mouth daily.       Dexlansoprazole (DEXILANT) 60 mg CpDM Take 1 Cap by mouth daily. Pt instructed to take with a small bit of water on DOS (Patient not taking: Reported on 5/26/2022)      escitalopram (LEXAPRO) 20 mg tablet Take 20 mg by mouth daily. ARIPiprazole (ABILIFY) 2 mg tablet Take 10 mg by mouth nightly. Indications: depression      amitriptyline (ELAVIL) 100 mg tablet Take 100 mg by mouth two (2) times a day. Indications: helps with sleep         Past History       Past Medical History:  Past Medical History:   Diagnosis Date    Arthritis     osteo    Difficult intubation     esophagus needs to be stretch every 2 years, had done 03/2022    GERD (gastroesophageal reflux disease)     relux, and hiatal hernia    Hypertension     Osteoarthritis of right knee 2/1/2013    Other ill-defined conditions(799.89)     hiatal hernia    Psychiatric disorder     depression    S/P total knee arthroplasty 02/04/2013       Past Surgical History:  Past Surgical History:   Procedure Laterality Date    HX CATARACT REMOVAL      left eye    HX CHOLECYSTECTOMY      laparoscopic    HX COLONOSCOPY      had 2 done, f/u in 10 years    HX HEENT  04/2022    repair detached retena    HX HYSTERECTOMY      TVH    HX KNEE ARTHROSCOPY      right knee replacement    HX OOPHORECTOMY      BSO    HX ORTHOPAEDIC      manipulation of left shoulder under anesthesia       Family History:  History reviewed. No pertinent family history. Social History:  Social History     Tobacco Use    Smoking status: Never    Smokeless tobacco: Never   Substance Use Topics    Alcohol use: No    Drug use: Never       Allergies: Allergies   Allergen Reactions    Neosporin [Benzalkonium Chloride] Other (comments)     Soreness and redness    Other Medication Shortness of Breath and Swelling     Yellow Jacket bee stings         Review of Systems   Review of Systems   Constitutional:  Negative for fever. Respiratory:  Negative for shortness of breath.     Cardiovascular:  Negative for chest pain.   Gastrointestinal:  Negative for abdominal pain. Musculoskeletal:  Positive for arthralgias. All other systems reviewed and are negative. Physical Exam     Vitals:    07/26/22 0811 07/26/22 1039   BP: 106/67 115/73   Pulse: 79 80   Resp: 12    Temp: 98.4 °F (36.9 °C)    SpO2: 92% 94%   Weight: 88.9 kg (195 lb 15.8 oz)      Physical Exam    Nursing notes and vital signs reviewed    Constitutional: Non toxic appearing, elderly, moderate distress  Head: Normocephalic, Atraumatic  Eyes: EOMI  Neck: Supple  Cardiovascular: Regular rate and rhythm, no murmurs, rubs, or gallops  Chest: Normal work of breathing and chest excursion bilaterally  Lungs: Clear to ausculation bilaterally  Abdomen: Soft, non tender, non distended  Back: No evidence of trauma or deformity  Extremities: Patient unable to move left leg and has pain with any passive movement of the leg, distal neurovascular intact  Skin: Warm and dry, normal cap refill  Neuro: Alert and appropriate  Psychiatric: Normal mood and affect      Diagnostic Study Results     Labs -     Recent Results (from the past 12 hour(s))   CBC WITH AUTOMATED DIFF    Collection Time: 07/26/22  8:30 AM   Result Value Ref Range    WBC 6.1 4.6 - 13.2 K/uL    RBC 3.52 (L) 4.20 - 5.30 M/uL    HGB 10.4 (L) 12.0 - 16.0 g/dL    HCT 32.5 (L) 35.0 - 45.0 %    MCV 92.3 78.0 - 100.0 FL    MCH 29.5 24.0 - 34.0 PG    MCHC 32.0 31.0 - 37.0 g/dL    RDW 12.4 11.6 - 14.5 %    PLATELET 826 842 - 901 K/uL    MPV 8.9 (L) 9.2 - 11.8 FL    NRBC 0.0 0  WBC    ABSOLUTE NRBC 0.00 0.00 - 0.01 K/uL    NEUTROPHILS 72 40 - 73 %    LYMPHOCYTES 18 (L) 21 - 52 %    MONOCYTES 7 3 - 10 %    EOSINOPHILS 2 0 - 5 %    BASOPHILS 0 0 - 2 %    IMMATURE GRANULOCYTES 0 0.0 - 0.5 %    ABS. NEUTROPHILS 4.4 1.8 - 8.0 K/UL    ABS. LYMPHOCYTES 1.1 0.9 - 3.6 K/UL    ABS. MONOCYTES 0.5 0.05 - 1.2 K/UL    ABS. EOSINOPHILS 0.1 0.0 - 0.4 K/UL    ABS. BASOPHILS 0.0 0.0 - 0.1 K/UL    ABS. IMM.  GRANS. 0.0 0.00 - 0.04 K/UL    DF AUTOMATED     METABOLIC PANEL, BASIC    Collection Time: 07/26/22  8:30 AM   Result Value Ref Range    Sodium 136 136 - 145 mmol/L    Potassium 3.2 (L) 3.5 - 5.5 mmol/L    Chloride 99 (L) 100 - 111 mmol/L    CO2 30 21 - 32 mmol/L    Anion gap 7 3.0 - 18 mmol/L    Glucose 122 (H) 74 - 99 mg/dL    BUN 20 (H) 7.0 - 18 MG/DL    Creatinine 1.18 0.6 - 1.3 MG/DL    BUN/Creatinine ratio 17 12 - 20      GFR est AA 54 (L) >60 ml/min/1.73m2    GFR est non-AA 45 (L) >60 ml/min/1.73m2    Calcium 8.6 8.5 - 10.1 MG/DL       Radiologic Studies -   XR PELV 1 OR 2 V   Final Result      Dislocated left hip arthroplasty. CT Results  (Last 48 hours)      None          CXR Results  (Last 48 hours)      None            Medications given in the ED-  Medications   morphine injection 4 mg (4 mg IntraVENous Given 7/26/22 1036)         Medical Decision Making   I am the first provider for this patient. I reviewed the vital signs, available nursing notes, past medical history, past surgical history, family history and social history. Vital Signs-Reviewed the patient's vital signs. Pulse Oximetry Analysis - 94% on room air, not hypoxic     Records Reviewed: Nursing Notes and Old Medical Records    Provider Notes (Medical Decision Making): Lauren Benitez is a 67 y.o. 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 Hemodynamically stable and neurovascular intact. Discussed with her orthopedic surgeon who plans to take patient to the OR for reduction due to the recurrent nature of this prosthetic dislocation. Patient understands and agrees with this plan. Procedures:  Procedures    ED Course:   CONSULT NOTE:   11:23 AM  Dr. Fabio Nguyen spoke with Dr. Rowan Lainez   Specialty: Orthopedics  Discussed pt's hx, disposition, and available diagnostic and imaging results over the telephone. Reviewed care plans. Will talk to patient about options. 12:01 PM  Updated patient on all results and plan.   All questions answered. Patient reports that Dr. Marialuisa Lopez plans to take to OR to reduce. Diagnosis and Disposition     Critical Care Time: None    Core Measures:  For Hospitalized Patients:    1. Hospitalization Decision Time:  The decision to hospitalize the patient was made by Dr. Flori Medina at 12:01 PM on 7/26/2022    2. Aspirin: Aspirin was not given because the patient did not present with a stroke at the time of their Emergency Department evaluation    12:02 PM  Patient is being admitted to the hospital by Dr. Nahum Parekh. The results of their tests and reasons for their admission have been discussed with them and/or available family. They convey agreement and understanding for the need to be admitted and for their admission diagnosis. CONDITIONS ON ADMISSION:  Sepsis is not present at the time of admission. Deep Vein Thrombosis is not present at the time of admission. Thrombosis is not present at the time of admission. Urinary Tract Infection is not present at the time of admission. Pneumonia is not present at the time of admission. MRSA is not present at the time of admission. Wound infection is not present at the time of admission. Pressure Ulcer is not present at the time of admission. CLINICAL IMPRESSION:    1. Closed dislocation of left hip, initial encounter ([facility] [address]         _______________________________      Please note that this dictation was completed with [employer], the computer voice recognition software. Quite often unanticipated grammatical, syntax, homophones, and other interpretive errors are inadvertently transcribed by the computer software. Please disregard these errors. Please excuse any errors that have escaped final proofreading.

## 2022-07-26 NOTE — Clinical Note
Status[de-identified] INPATIENT [101]   Type of Bed: Surgical [18]   Cardiac Monitoring Required?: No   Inpatient Hospitalization Certified Necessary for the Following Reasons: 3.  Patient receiving treatment that can only be provided in an inpatient setting (further clarification in H&P documentation)   Admitting Diagnosis: Hip dislocation, left, initial encounter [facility]) [[id number]]   Admitting Physician: Brook Joya   Attending Physician: Brook Joya   Estimated Length of Stay: 2 Midnights   Discharge Plan[de-identified] Home with Office Follow-up

## 2022-07-26 NOTE — ROUTINE PROCESS
Bedside and Verbal shift change report given to [address] [place] [address] (oncoming nurse) by Romina Santizo RN (offgoing nurse). Report included the following information SBAR, Kardex, MAR and Recent Results. Pt off floor in the OR.  Will return to room 201

## 2022-07-26 NOTE — PROGRESS NOTES
1335  VSS. Pt transferred from stretcher to bed.     1341  Assumed care of pt at this time. Assessment complete. Pt alert and oriented x 4. Shows no sign of distress. Fall risk arm band in place. Denies SOB and chest pain. Pt lungs clear bilaterally. Cap refill  less than 3 seconds. Pt denies numbness and tingling to all extremities. Stated pain 6/10. Pt has 20 G IV to L forearm. Pt has no dressing . Skin intact. Scar to L hip from previous surgery back in may 2022. Incentive spirometer at bedside can reach 2500. Pt encouraged to continue use of IS. Pt verbalized understanding. Call light and possessions within reach. Bed locked and in low position. Will continue to monitor. Plan for closed hip reduction possibly today at 5pm.    1358  Nurse informed Dr Breana Faye office that pt has arrived to room 201 and needs orders. Awaiting return call     1459  OR informed nurse that pt will be picked up to transfer to pre op at 4pm.    1520  CHG wipes and nozin swabs completed. Pt has been NPO. Gown and linen changed. Pt has cellphone and removed jewelry and dentures which will be left with . Callbell and possessions within reach    [address] REPORT:    Verbal report given to TERRENCE Torres RN(name) on [facility]  being transferred to Pre op holding(unit) for ordered procedure       Report consisted of patients Situation, Background, Assessment and   Recommendations(SBAR). Information from the following report(s) SBAR, Kardex, Intake/Output, MAR, and Recent Results was reviewed with the receiving nurse.     Lines:   Peripheral IV 07/26/22 Anterior;Left;Proximal Forearm (Active)   Site Assessment Clean, dry, & intact 07/26/22 1341   Phlebitis Assessment 0 07/26/22 1341   Infiltration Assessment 0 07/26/22 1341   Dressing Status Clean, dry, & intact 07/26/22 1341   Dressing Type Tape;Transparent 07/26/22 1341   Hub Color/Line Status Pink;Capped 07/26/22 1341   Action Taken Open ports on tubing capped 07/26/22 1341   Alcohol Cap Used Yes 07/26/22 1341        Opportunity for questions and clarification was provided. Patient transported with:   Tech and IV fluids. 1633  Pt being transported to pre op holding at this time.

## 2022-07-26 NOTE — ROUTINE PROCESS
TRANSFER - IN REPORT:    Verbal report received from HUSSEIN Darnell(name) on Siva Leahy  being received from ED(unit) for routine post - op      Report consisted of patients Situation, Background, Assessment and   Recommendations(SBAR). Information from the following report(s) SBAR, Kardex, [facility] and Recent Results was reviewed with the receiving nurse. Opportunity for questions and clarification was provided. Assessment completed upon patients arrival to unit and care assumed.

## 2022-07-26 NOTE — H&P
[address] [facility] [address] Medicine  History and Physical Exam    Patient: Rj Green MRN: [id number]  SSN: [id number]    YOB: 1950  Age: 67 y.o. Sex: female      Subjective:      Chief Complaint: lt hip pain    History of Present Illness:  Patient complains of lt hip pain and difficulty ambulating. S/P LAURA with previous dislocation. Re dislocated today. Past Medical History:   Diagnosis Date    Arthritis     osteo    Difficult intubation     esophagus needs to be stretch every 2 years, had done 03/2022    GERD (gastroesophageal reflux disease)     relux, and hiatal hernia    Hypertension     Osteoarthritis of right knee 2/1/2013    Other ill-defined conditions(799.89)     hiatal hernia    Psychiatric disorder     depression    S/P total knee arthroplasty 02/04/2013     Past Surgical History:   Procedure Laterality Date    HX CATARACT REMOVAL      left eye    HX CHOLECYSTECTOMY      laparoscopic    HX COLONOSCOPY      had 2 done, f/u in 10 years    HX HEENT  04/2022    repair detached retena    HX HYSTERECTOMY      TVH    HX KNEE ARTHROSCOPY      right knee replacement    HX OOPHORECTOMY      BSO    HX ORTHOPAEDIC      manipulation of left shoulder under anesthesia     Social History     Occupational History    Not on file   Tobacco Use    Smoking status: Never    Smokeless tobacco: Never   Substance and Sexual Activity    Alcohol use: No    Drug use: Never    Sexual activity: Not on file     Prior to Admission medications    Medication Sig Start Date End Date Taking? Authorizing Provider   aspirin delayed-release 81 mg tablet Take 1 Tablet by mouth two (2) times a day. 5/23/22  Yes Claudeen Console, PA-C   linaCLOtide Jose Tomlinson) 145 mcg cap capsule Take 145 mg by mouth. Indications: IBS   Yes Provider, Historical   clonazePAM (KlonoPIN) 1 mg tablet Take 1 mg by mouth two (2) times a day.    Yes Provider, Historical   triamterene-hydroCHLOROthiazide (DYAZIDE) 37.5-25 mg per capsule Take 1 Capsule by mouth daily. Yes Provider, Historical   escitalopram (LEXAPRO) 20 mg tablet Take 20 mg by mouth daily. Yes Provider, Historical   ARIPiprazole (ABILIFY) 2 mg tablet Take 10 mg by mouth nightly. Indications: depression   Yes Provider, Historical   amitriptyline (ELAVIL) 100 mg tablet Take 100 mg by mouth two (2) times a day. Indications: helps with sleep   Yes Provider, Historical   naloxone (NARCAN) 4 mg/actuation nasal spray Use 1 spray intranasally, then discard. Repeat with new spray every 2 min as needed for opioid overdose symptoms, alternating nostrils. Patient not taking: No sig reported 5/23/22   Cherry Mcintosh PA-C   flavoxate HCl (FLAVOXATE PO) Take  by mouth. Provider, Historical   fluvoxaMINE (LUVOX) 50 mg tablet Take 50 mg by mouth nightly. Indications: depression    Provider, Historical   dexlansoprazole (DEXILANT) 60 mg CpDB capsule (delayed release) Take 1 Cap by mouth daily. Pt instructed to take with a small bit of water on DOS  Patient not taking: No sig reported    Provider, Historical       Allergies: Allergies   Allergen Reactions    Neosporin [Benzalkonium Chloride] Other (comments)     Soreness and redness    Other Medication Shortness of Breath and Swelling     Yellow Jacket bee stings        Review of Systems:  A comprehensive review of systems was negative except for that written in the History of Present Illness. Objective:       Physical Exam:  HEENT: Normocephalic, atraumatic  Lungs:  Clear to auscultation  Heart:   Regular rate and rhythm  Abdomen: Soft  Extremities:  Pain with range of motion of the lt hip  Neurological: Grossly neurovascularly intact    Assessment:      Dislocated lt nate. Plan:          Proceed with scheduled closed vs open revision of hip. Pt prefers closed if possible. The various methods of treatment have been discussed with the patient and family.  After consideration of risks, benefits, and other options for treatment, the patient has consented to surgical interventions. Questions were answered and preoperative teaching was done by me.      Signed By: Aisha Whitlock MD     July 26, 2022

## 2022-07-26 NOTE — H&P
[address] [facility] [address] Medicine  History and Physical Exam    Patient: Paolo Forrester MRN: [id number]  SSN: [id number]    YOB: 1950  Age: 67 y.o. Sex: female      Subjective:      Chief Complaint: lt hip pain    History of Present Illness:  Patient complains of lt hip pain and difficulty ambulating. S/P LAURA with previous dislocation. Re dislocated today. Past Medical History:   Diagnosis Date    Arthritis     osteo    Difficult intubation     esophagus needs to be stretch every 2 years, had done 03/2022    GERD (gastroesophageal reflux disease)     relux, and hiatal hernia    Hypertension     Osteoarthritis of right knee 2/1/2013    Other ill-defined conditions(799.89)     hiatal hernia    Psychiatric disorder     depression    S/P total knee arthroplasty 02/04/2013     Past Surgical History:   Procedure Laterality Date    HX CATARACT REMOVAL      left eye    HX CHOLECYSTECTOMY      laparoscopic    HX COLONOSCOPY      had 2 done, f/u in 10 years    HX HEENT  04/2022    repair detached retena    HX HYSTERECTOMY      TVH    HX KNEE ARTHROSCOPY      right knee replacement    HX OOPHORECTOMY      BSO    HX ORTHOPAEDIC      manipulation of left shoulder under anesthesia     Social History     Occupational History    Not on file   Tobacco Use    Smoking status: Never    Smokeless tobacco: Never   Substance and Sexual Activity    Alcohol use: No    Drug use: Never    Sexual activity: Not on file     Prior to Admission medications    Medication Sig Start Date End Date Taking? Authorizing Provider   aspirin delayed-release 81 mg tablet Take 1 Tablet by mouth two (2) times a day. 5/23/22  Yes Peter Paniagua PA-C   linaCLOtide [place]) 145 mcg cap capsule Take 145 mg by mouth. Indications: IBS   Yes Provider, Historical   clonazePAM (KlonoPIN) 1 mg tablet Take 1 mg by mouth two (2) times a day.    Yes Provider, Historical   triamterene-hydroCHLOROthiazide (DYAZIDE) 37.5-25 mg per capsule Take 1 Capsule by mouth daily. Yes Provider, Historical   escitalopram (LEXAPRO) 20 mg tablet Take 20 mg by mouth daily. Yes Provider, Historical   ARIPiprazole (ABILIFY) 2 mg tablet Take 10 mg by mouth nightly. Indications: depression   Yes Provider, Historical   amitriptyline (ELAVIL) 100 mg tablet Take 100 mg by mouth two (2) times a day. Indications: helps with sleep   Yes Provider, Historical   naloxone (NARCAN) 4 mg/actuation nasal spray Use 1 spray intranasally, then discard. Repeat with new spray every 2 min as needed for opioid overdose symptoms, alternating nostrils. Patient not taking: No sig reported 5/23/22   Gemini Powers PA-C   flavoxate HCl (FLAVOXATE PO) Take  by mouth. Provider, Historical   fluvoxaMINE (LUVOX) 50 mg tablet Take 50 mg by mouth nightly. Indications: depression    Provider, Historical   dexlansoprazole (DEXILANT) 60 mg CpDB capsule (delayed release) Take 1 Cap by mouth daily. Pt instructed to take with a small bit of water on DOS  Patient not taking: No sig reported    Provider, Historical       Allergies: Allergies   Allergen Reactions    Neosporin [Benzalkonium Chloride] Other (comments)     Soreness and redness    Other Medication Shortness of Breath and Swelling     Yellow Jacket bee stings        Review of Systems:  A comprehensive review of systems was negative except for that written in the History of Present Illness. Objective:       Physical Exam:  HEENT: Normocephalic, atraumatic  Lungs:  Clear to auscultation  Heart:   Regular rate and rhythm  Abdomen: Soft  Extremities:  Pain with range of motion of the lt hip  Neurological: Grossly neurovascularly intact    Assessment:      Dislocated lt nate. Plan:          Proceed with scheduled closed vs open revision of hip. Pt prefers closed if possible. The various methods of treatment have been discussed with the patient and family.  After consideration of risks, benefits, and other options for treatment, the patient has consented to surgical interventions. Questions were answered and preoperative teaching was done by me.      Signed By: Lazarus Shelter, MD     July 26, 2022

## 2022-07-26 NOTE — PERIOP NOTES
18 [facility] made aware that SBAR is ready for review. Patient assigned room #201.  Vicki Flores will be the nurse

## 2022-07-26 NOTE — PERIOP NOTES
18 [facility] made aware that SBAR is ready for review. Patient assigned room #201.  Benny Garg will be the nurse

## 2022-07-26 NOTE — ED NOTES
TRANSFER - OUT REPORT:    Verbal report given to Sepideh RN (name) on Lauren Care  being transferred to surg (unit) for routine progression of care       Report consisted of patients Situation, Background, Assessment and   Recommendations(SBAR). Information from the following report(s) SBAR, ED Summary, Procedure Summary and MAR was reviewed with the receiving nurse. Lines:   Peripheral IV 07/26/22 Anterior;Left;Proximal Forearm (Active)   Dressing Status Clean, dry, & intact 07/26/22 0813        Opportunity for questions and clarification was provided.       Patient transported with:   [employer]

## 2022-07-26 NOTE — PROGRESS NOTES
Cm met with pt at bedside to introduce self and explained cm role as d/c planner, pt aware of cm role due to past hospitalizations, spouse at bedside. CM did verify all information on facesheet at this time, if home health is recommended at discharge pt would like to use [facility], at this time pt still needs to be seen by ortho, cm will cont to follow case.

## 2022-07-26 NOTE — ROUTINE PROCESS
TRANSFER - IN REPORT:    Verbal report received from HUSSEIN Darnell(name) on [place]  being received from ED(unit) for routine post - op      Report consisted of patients Situation, Background, Assessment and   Recommendations(SBAR). Information from the following report(s) SBAR, Kardex, [facility] and Recent Results was reviewed with the receiving nurse. Opportunity for questions and clarification was provided. Assessment completed upon patients arrival to unit and care assumed.

## 2022-07-27 VITALS
BODY MASS INDEX: 28.12 KG/M2 | OXYGEN SATURATION: 99 % | HEART RATE: 87 BPM | WEIGHT: 195.99 LBS | TEMPERATURE: 98.3 F | SYSTOLIC BLOOD PRESSURE: 116 MMHG | DIASTOLIC BLOOD PRESSURE: 68 MMHG | RESPIRATION RATE: 16 BRPM

## 2022-07-27 PROCEDURE — 74011250637 HC RX REV CODE- 250/637: Performed by: PHYSICIAN ASSISTANT

## 2022-07-27 PROCEDURE — 74011000250 HC RX REV CODE- 250: Performed by: PHYSICIAN ASSISTANT

## 2022-07-27 PROCEDURE — 51798 US URINE CAPACITY MEASURE: CPT

## 2022-07-27 RX ORDER — PANTOPRAZOLE SODIUM 40 MG/1
40 TABLET, DELAYED RELEASE ORAL DAILY
Status: DISCONTINUED | OUTPATIENT
Start: 2022-07-27 | End: 2022-07-27 | Stop reason: HOSPADM

## 2022-07-27 RX ADMIN — ASPIRIN 81 MG: 81 TABLET, COATED ORAL at 08:37

## 2022-07-27 RX ADMIN — PANTOPRAZOLE SODIUM 40 MG: 40 TABLET, DELAYED RELEASE ORAL at 11:27

## 2022-07-27 RX ADMIN — AMITRIPTYLINE HYDROCHLORIDE 100 MG: 50 TABLET, FILM COATED ORAL at 08:37

## 2022-07-27 RX ADMIN — ESCITALOPRAM OXALATE 20 MG: 10 TABLET ORAL at 08:37

## 2022-07-27 RX ADMIN — SODIUM CHLORIDE, PRESERVATIVE FREE 10 ML: 5 INJECTION INTRAVENOUS at 06:18

## 2022-07-27 RX ADMIN — CLONAZEPAM 1 MG: 0.5 TABLET ORAL at 08:37

## 2022-07-27 NOTE — DISCHARGE SUMMARY
Total Hip Discharge Summary    Patient: Yuridia May               Sex: female         MRN: [id number]       YOB: 1950      Age:  67 y.o.        LOS:  LOS: 1 day                DOA: 7/26/2022    Discharge Date: 7/27/2022    Admission Diagnoses: Hip dislocation, left, initial encounter [facility]) [S73.005A]    Discharge Diagnoses:    Problem List as of 7/27/2022 Date Reviewed: 7/26/2022            Codes Class Noted - Resolved    Hip dislocation, left, initial encounter [facility]) ICD-10-CM: S73.005A  ICD-9-CM: 835.00  7/11/2022 - Present        Status post revision of total hip replacement ICD-10-CM: Z96.649  ICD-9-CM: V43.64  5/25/2022 - Present        Status post left hip replacement ICD-10-CM: W23.897  ICD-9-CM: V43.64  5/23/2022 - Present        S/P total knee arthroplasty ICD-10-CM: X82.075  ICD-9-CM: V43.65  2/4/2013 - Present        RESOLVED: Osteoarthritis of right knee ICD-10-CM: M17.11  ICD-9-CM: 715.96  2/1/2013 - 2/4/2013           This is a 67 y.o. female with a  history of multiple left hip dislocations after THR. The patient has failed to respond to conservative care. The option of a closed reduction vs THR revision was discussed with the patient. Risks and benefits of the procedure were explained to the patient as well as other treatment options and possible surgical outcomes. The patient acknowledged and consent was obtained. The patient was therefore scheduled to undergo a left total hip arthroplasty closed reduction vs. Open revision with Dr. Omar Valentine. The patient was taken to the operating room for the above-stated procedure. SCDs were used for DVT prophylaxis. The patient tolerated the procedure well without any complications, and was taken to the recovery room in stable condition. The patient was then transferred to the postoperative orthopedic floor for convalescence, pain management, as well as discharge planning.  At the time of discharge, the patient was able to ambulate safely and had an understanding of the explicit discharge precautions and instructions following surgery. The patient was discharged to follow up with Dr. Mari Velarde in approximately 10 to 14 days. Discharge Condition: Good  DISPOSITION: To home. On the day of discharge the patient was afebrile. Vital signs were stable. The patient was in no acute distress. Extremity was warm and well-perfused, distally neurovascularly intact. DISCHARGE INSTRUCTIONS:   Continue physical therapy for gait training and strengthening. Continue therapeutic exercises. Follow up in 10 to 14 days with Dr. Mari Velarde. DISCHARGE MEDICATIONS:   Current Discharge Medication List        CONTINUE these medications which have NOT CHANGED    Details   aspirin delayed-release 81 mg tablet Take 1 Tablet by mouth two (2) times a day. Qty: 60 Tablet, Refills: 0      linaCLOtide (LINZESS) 145 mcg cap capsule Take 145 mg by mouth. Indications: IBS      clonazePAM (KlonoPIN) 1 mg tablet Take 1 mg by mouth two (2) times a day. triamterene-hydroCHLOROthiazide (DYAZIDE) 37.5-25 mg per capsule Take 1 Capsule by mouth daily. escitalopram (LEXAPRO) 20 mg tablet Take 20 mg by mouth daily. ARIPiprazole (ABILIFY) 2 mg tablet Take 10 mg by mouth nightly. Indications: depression      amitriptyline (ELAVIL) 100 mg tablet Take 100 mg by mouth two (2) times a day. Indications: helps with sleep      flavoxate HCl (FLAVOXATE PO) Take  by mouth. fluvoxaMINE (LUVOX) 50 mg tablet Take 50 mg by mouth nightly.  Indications: depression           STOP taking these medications       naloxone (NARCAN) 4 mg/actuation nasal spray Comments:   Reason for Stopping:         dexlansoprazole (DEXILANT) 60 mg CpDB capsule (delayed release) Comments:   Reason for Stopping:

## 2022-07-27 NOTE — PROGRESS NOTES
conducted an initial consultation and Spiritual Assessment for Kayla Pacheco, who is a 67 y.o.,female. Patients Primary Language is: [place]. According to the patients EMR Latter day Affiliation is: [employer].     The reason the Patient came to the hospital is:   Patient Active Problem List    Diagnosis Date Noted    Hip dislocation, left, initial encounter ([facility] [address] 07/11/2022    Status post revision of total hip replacement 05/25/2022    Status post left hip replacement 05/23/2022    S/P total knee arthroplasty 02/04/2013        The  provided the following Interventions:  Initiated a relationship of care and support. Explored issues of cristi, belief, spirituality and Mormonism/ritual needs while hospitalized. Listened empathically. Provided chaplaincy education. Provided information about Spiritual Care Services. Offered prayer and assurance of continued prayers on patients behalf. Chart reviewed. The following outcomes were achieved:  Patient shared limited information about both their medical narrative and spiritual journey/beliefs. Patient processed feeling about current hospitalization. Patient expressed gratitude for pastoral care visit. Assessment:  Patient does not have any Mormonism/cultural needs that will affect patients preferences in health care. There are no further spiritual or Mormonism issues which require intervention at this time. Plan:  Chaplains will continue to follow and will provide pastoral care on an as needed/requested basis.  recommends bedside caregivers page  on duty if patient shows signs of acute spiritual or emotional distress.       Sister Varsha Emmanuel, [place], [address]  [phone]

## 2022-07-27 NOTE — DISCHARGE SUMMARY
Total Hip Discharge Summary    Patient: Collette Deng               Sex: female         MRN: [id number]       YOB: 1950      Age:  67 y.o.        LOS:  LOS: 1 day                DOA: 7/26/2022    Discharge Date: 7/27/2022    Admission Diagnoses: Hip dislocation, left, initial encounter [facility]) [S73.005A]    Discharge Diagnoses:    Problem List as of 7/27/2022 Date Reviewed: 7/26/2022            Codes Class Noted - Resolved    Hip dislocation, left, initial encounter [facility]) ICD-10-CM: S73.005A  ICD-9-CM: 835.00  7/11/2022 - Present        Status post revision of total hip replacement ICD-10-CM: Z96.649  ICD-9-CM: V43.64  5/25/2022 - Present        Status post left hip replacement ICD-10-CM: Q70.145  ICD-9-CM: V43.64  5/23/2022 - Present        S/P total knee arthroplasty ICD-10-CM: D15.855  ICD-9-CM: V43.65  2/4/2013 - Present        RESOLVED: Osteoarthritis of right knee ICD-10-CM: M17.11  ICD-9-CM: 715.96  2/1/2013 - 2/4/2013           This is a 67 y.o. female with a  history of multiple left hip dislocations after THR. The patient has failed to respond to conservative care. The option of a closed reduction vs THR revision was discussed with the patient. Risks and benefits of the procedure were explained to the patient as well as other treatment options and possible surgical outcomes. The patient acknowledged and consent was obtained. The patient was therefore scheduled to undergo a left total hip arthroplasty closed reduction vs. Open revision with Dr. Vasquez Srinivasan. The patient was taken to the operating room for the above-stated procedure. SCDs were used for DVT prophylaxis. The patient tolerated the procedure well without any complications, and was taken to the recovery room in stable condition. The patient was then transferred to the postoperative orthopedic floor for convalescence, pain management, as well as discharge planning.  At the time of discharge, the patient was able to ambulate safely and had an understanding of the explicit discharge precautions and instructions following surgery. The patient was discharged to follow up with Dr. Anahi Cosme in approximately 10 to 14 days. Discharge Condition: Good  DISPOSITION: To home. On the day of discharge the patient was afebrile. Vital signs were stable. The patient was in no acute distress. Extremity was warm and well-perfused, distally neurovascularly intact. DISCHARGE INSTRUCTIONS:   Continue physical therapy for gait training and strengthening. Continue therapeutic exercises. Follow up in 10 to 14 days with Dr. Anahi Cosme. DISCHARGE MEDICATIONS:   Current Discharge Medication List        CONTINUE these medications which have NOT CHANGED    Details   aspirin delayed-release 81 mg tablet Take 1 Tablet by mouth two (2) times a day. Qty: 60 Tablet, Refills: 0      linaCLOtide (LINZESS) 145 mcg cap capsule Take 145 mg by mouth. Indications: IBS      clonazePAM (KlonoPIN) 1 mg tablet Take 1 mg by mouth two (2) times a day. triamterene-hydroCHLOROthiazide (DYAZIDE) 37.5-25 mg per capsule Take 1 Capsule by mouth daily. escitalopram (LEXAPRO) 20 mg tablet Take 20 mg by mouth daily. ARIPiprazole (ABILIFY) 2 mg tablet Take 10 mg by mouth nightly. Indications: depression      amitriptyline (ELAVIL) 100 mg tablet Take 100 mg by mouth two (2) times a day. Indications: helps with sleep      flavoxate HCl (FLAVOXATE PO) Take  by mouth. fluvoxaMINE (LUVOX) 50 mg tablet Take 50 mg by mouth nightly.  Indications: depression           STOP taking these medications       naloxone (NARCAN) 4 mg/actuation nasal spray Comments:   Reason for Stopping:         dexlansoprazole (DEXILANT) 60 mg CpDB capsule (delayed release) Comments:   Reason for Stopping:

## 2022-07-27 NOTE — PROGRESS NOTES
Problem: Falls - Risk of  Goal: *Absence of Falls  Description: Document Alessandro Burden Fall Risk and appropriate interventions in the flowsheet. Outcome: Progressing Towards Goal  Note: Fall Risk Interventions:  Mobility Interventions: Assess mobility with egress test         Medication Interventions: Teach patient to arise slowly    Elimination Interventions: Call light in reach    History of Falls Interventions:  Investigate reason for fall         Problem: Pain  Goal: *Control of Pain  Outcome: Progressing Towards Goal

## 2022-07-27 NOTE — PROGRESS NOTES
0791  Assumed care of pt at this time. Assessment complete. Pt alert and oriented x 4. Shows no sign of distress. Fall risk arm band in place. Denies SOB and chest pain. Pt lungs clear bilaterally. Cap refill  less than 3 seconds. Pt denies numbness and tingling to all extremities. Stated pain 1/10. Pt has 20 G IV to L forearm. Pt has no dressing skin intact. Scar to Left hip from prev Hip replacement scds and TEDs in place to BLE Incentive spirometer at bedside can reach 2500. Pt encouraged to continue use of IS. Pt verbalized understanding. Ice pack applied. Call light and possessions within reach. Bed locked and in low position. Will continue to monitor. 7202  Paged Nunzio Schwab in regard to pt being straight cath x2 last night and if genao needs to be placed should pt have difficulty voiding again. Pt states this is normal for her. Pt also takes Dexilant 60mg PO says she takes that everyday. Awaiting return phone call    Benjiman Cabot states pt does not need to see or clear PT prior to being d/c. Pt needs to void post op and may d/c home. Telephone order with readback for 40mg of PO protonix. 1129  Pt states she is still unable to void. Will attempt again at 12pm. Otherwise no concerns.  at bedside. Possessions and callbell within reach     1206  Pt has voided post op. Pt states she is ready to d/c home after lunch. 1302  Dual AVS reviewed with Renzo BAIRD RN. All medications reviewed individually with patient. Opportunities for questions and concerns provided. Patient to be discharged via (mode of transport ie. Car, ambulance or air transport) car. Patient's arm band appropriately discarded.     IV removed

## 2022-07-27 NOTE — PERIOP NOTES
TRANSFER - OUT REPORT:    Verbal report given to [facility] Otoniel Kumar RN(name) on Kiki Arnold  being transferred to [facility](unit) for routine post - op       Report consisted of patients Situation, Background, Assessment and   Recommendations(SBAR). Information from the following report(s) SBAR, Kardex, OR Summary, Procedure Summary, Intake/Output, and MAR was reviewed with the receiving nurse. Lines:   Peripheral IV 07/26/22 Anterior;Left;Proximal Forearm (Active)   Site Assessment Clean, dry, & intact 07/26/22 1925   Phlebitis Assessment 0 07/26/22 1925   Infiltration Assessment 0 07/26/22 1925   Dressing Status Clean, dry, & intact 07/26/22 1925   Dressing Type Transparent 07/26/22 1925   Hub Color/Line Status Infusing 07/26/22 1925   Action Taken Open ports on tubing capped 07/26/22 1341   Alcohol Cap Used Yes 07/26/22 1341        Opportunity for questions and clarification was provided.       Patient transported with:   Registered Nurse

## 2022-07-27 NOTE — DISCHARGE INSTRUCTIONS
Post operative Instructions for THR relocation  1. You may weight bear as tolerated with the walker. 2. You will begin PT again with dislocation precautions including no hip flexion greater than 90 degrees. 3. You may follow-up with Dr. Yamilet Cortes in our office in about 7-10 days from hospital discharge.

## 2022-07-27 NOTE — PROGRESS NOTES
conducted an initial consultation and Spiritual Assessment for Xavier Prescott, who is a 67 y.o.,female. Patients Primary Language is: [place]. According to the patients EMR Buddhism Affiliation is: [facility].     The reason the Patient came to the hospital is:   Patient Active Problem List    Diagnosis Date Noted    Hip dislocation, left, initial encounter ([facility] [address] 07/11/2022    Status post revision of total hip replacement 05/25/2022    Status post left hip replacement 05/23/2022    S/P total knee arthroplasty 02/04/2013        The  provided the following Interventions:  Initiated a relationship of care and support. Explored issues of cristi, belief, spirituality and Tenriism/ritual needs while hospitalized. Listened empathically. Provided chaplaincy education. Provided information about Spiritual Care Services. Offered prayer and assurance of continued prayers on patients behalf. Chart reviewed. The following outcomes were achieved:  Patient shared limited information about both their medical narrative and spiritual journey/beliefs. Patient processed feeling about current hospitalization. Patient expressed gratitude for pastoral care visit. Assessment:  Patient does not have any Tenriism/cultural needs that will affect patients preferences in health care. There are no further spiritual or Tenriism issues which require intervention at this time. Plan:  Chaplains will continue to follow and will provide pastoral care on an as needed/requested basis.  recommends bedside caregivers page  on duty if patient shows signs of acute spiritual or emotional distress.       Sister Mayra Cortez, [place], [address]  [phone]

## 2022-07-27 NOTE — DISCHARGE INSTRUCTIONS
Post operative Instructions for THR relocation  1. You may weight bear as tolerated with the walker. 2. You will begin PT again with dislocation precautions including no hip flexion greater than 90 degrees. 3. You may follow-up with Dr. Fang Olivo in our office in about 7-10 days from hospital discharge.

## 2022-07-27 NOTE — ROUTINE PROCESS
Bedside and Verbal shift change report given to Nirmal Strickland RN by Randy Isaac RN. Report included the following information SBAR and Kardex.

## 2022-07-27 NOTE — ROUTINE PROCESS
Bedside and Verbal shift change report given to Low Cosme RN by Lily Andrews RN. Report included the following information SBAR and Kardex.

## 2022-07-27 NOTE — ROUTINE PROCESS
TRANSFER - IN REPORT:    Verbal report received from Anthony Lao RN on Shanthi Chavarria  being received from PACU for routine post - op      Report consisted of patients Situation, Background, Assessment and   Recommendations(SBAR). Information from the following report(s) SBAR and Kardex was reviewed with the receiving nurse. Opportunity for questions and clarification was provided. Assessment completed upon patients arrival to unit and care assumed.

## 2022-07-27 NOTE — PROGRESS NOTES
2311  Bedside and verbal shift change report given to Jaya Fernandez RN (on coming nurse) by Olman Nichole RN (off going nurse). Report included the following information SBAR, Kardex, Intake/Output and MAR.    0516  Pt still retaining. Bladder scan. 589 mL  Will do a straight cath. 5420  1st straight cath was done w/ 700 mL on 2145, 07/26/22. 2nd straight cath completed w/ 600 mL yellow/clear output at this time. Pt is concerned on urinary retention. 0708  Bedside and verbal shift change report given by GABRIELA Lacey (off going nurse) to GABRIELA Bunn(on coming nurse). Report included the following information SBAR, Kardex, Intake/Output and MAR.

## 2022-07-27 NOTE — PERIOP NOTES
TRANSFER - OUT REPORT:    Verbal report given to [facility] [address], RN(name) on Katrin Silverio  being transferred to [facility](unit) for routine post - op       Report consisted of patients Situation, Background, Assessment and   Recommendations(SBAR). Information from the following report(s) SBAR, Kardex, OR Summary, Procedure Summary, Intake/Output, and MAR was reviewed with the receiving nurse. Lines:   Peripheral IV 07/26/22 Anterior;Left;Proximal Forearm (Active)   Site Assessment Clean, dry, & intact 07/26/22 1925   Phlebitis Assessment 0 07/26/22 1925   Infiltration Assessment 0 07/26/22 1925   Dressing Status Clean, dry, & intact 07/26/22 1925   Dressing Type Transparent 07/26/22 1925   Hub Color/Line Status Infusing 07/26/22 1925   Action Taken Open ports on tubing capped 07/26/22 1341   Alcohol Cap Used Yes 07/26/22 1341        Opportunity for questions and clarification was provided.       Patient transported with:   Registered Nurse

## 2022-07-27 NOTE — PROGRESS NOTES
Progress Note     Patient: Jo Ann Sesay MRN: [id number]  SSN: [id number]    YOB: 1950  Age: 67 y.o. Sex: female      POD:    1 Day Post-Op  S/P:    Procedure(s):  LEFT CLOSED REDUCTION HIP WITH HANA TABLE, C-ARM     Subjective:   Pt is without complaints of pain. She notes that she is waiting to urinate before discharge home since she did have to have a catheter yesterday. Objective:     Patient Vitals for the past 12 hrs:   BP Temp Pulse Resp SpO2   07/27/22 0725 116/68 98.3 °F (36.8 °C) 87 16 99 %   07/27/22 0144 (!) 138/92 97.6 °F (36.4 °C) 86 18 92 %   07/26/22 2201 129/66 98.3 °F (36.8 °C) 90 16 95 %   07/26/22 2101 124/68 98.3 °F (36.8 °C) 83 16 96 %   07/26/22 2015 129/65 97.4 °F (36.3 °C) 78 -- 96 %     Recent Labs     07/26/22  0830   HGB 10.4*   HCT 32.5*      K 3.2*   CL 99*   CO2 30   BUN 20*   CREA 1.18   *       Pt. resting in bed. Neurovascularly intact. Assessment:     Awake and alert. In good spirits. Plan:   Patient does not need to see PT/OT before discharge. Informed patient of restriction including no flexion of hip greater than 90 degrees. May discharge when able to urinate  Patient has PT and follow-up scheduled with our office.

## 2022-07-27 NOTE — PROGRESS NOTES
2101 - Head to toe assessment completed at this time. Pt alert and oriented times four. Pt denies CP and SOB. Pt rated pain 6/10 and medicated per MAR. Lung sounds clear. Bowel sounds present in all four quadrants. Pulses present and palpable. 20G Left FA IVF infusing as ordered. SCDs in place bilaterally as ordered. Bed in lowest position. Call bell and personal belongings within reach. Will continue to monitor. 2145 - Pt bladder scanned. Bladder scan revealed greater than 794 in bladder. 2155 - This writer spoke to Trino  in reference to patient unable to void and needing an order for straight cath. New order received TORB straight catheter prn.      2100 - Pt straight cathed at this time. 700cc of urine drained from bladder. Pt tolerated well. Will continue to monitor.

## 2022-07-27 NOTE — PROGRESS NOTES
Chart reviewed and spoke with pt to discuss d/c resuming outpatient PT services when discharged, cms updated AT Home care.

## 2022-07-27 NOTE — OP NOTES
[facility]  OPERATIVE REPORT    Name:  Loco Gómez  MR#:   [id number]  :  1950  ACCOUNT #:  [de-identified]  DATE OF SERVICE:  2022    PREOPERATIVE DIAGNOSIS:  Dislocated left total hip arthroplasty. POSTOPERATIVE DIAGNOSIS:  Dislocated left total hip arthroplasty. PROCEDURE PERFORMED:  Closed reduction, left total hip arthroplasty. SURGEON:  Patito Gates MD    ASSISTANT:  none    ANESTHESIA:  General.    ANESTHESIOLOGIST:  Ning Mitchell. COMPLICATIONS:  none    SPECIMENS REMOVED:  none    IMPLANTS:  none    ESTIMATED BLOOD LOSS:  0 mL. INDICATIONS FOR OPERATION:  The patient had a total hip arthroplasty approximately 2 months ago. She did have a postoperative dislocation 2 days postop which was treated with revision. Subsequently, she has dislocated twice since that time. After being seen in the emergency room today after dislocation, she notes that she would prefer a closed reduction and not a revision surgery. She dislocated the hip when she was sitting on a toilet-type seat with her knees lower than her hips. Her left knee went medial and the hip slipped out at that time. At her previous dislocation, it was felt that she was dislocating posteriorly. This is consistent with the mechanism of injury described for the dislocation on 2022. PROCEDURE:  The patient was taken to the operating room after satisfactory general anesthesia was established. Image intensification was used to confirm initial position. The reduction maneuver initially consisted traction followed by internal rotation. I actually did not expect this to succeed in reducing the hip, but I did want to try this to see if possibly the dislocation could be anterior. This maneuver did not succeed in reducing the hip. The hip was placed in approximately 90 degrees of hip flexion with traction being applied.   Initially, the patient was placed in internal rotation followed by gradual external rotation. Initially, the hip was pulled on the edge of the acetabulum that did not reduce. Subsequently, prereduction was done which successfully reduced the hip. Image intensification was used both in the modified AP position with the x-ray going from superior anterior to posterior inferior, and it was followed by a modified lateral at approximately 70 degrees lateral.  Using both these positions, various positions of the hip were checked for impingement. One position that seemed to reduce the most impingement was with the hip flexed and internally rotated. This did show evidence of the trunnion getting close to the acetabular shell and this was felt to be most consistent with a posterior dislocation. The patient had been advised previously and again prior to surgery that this was felt to be the mechanism of injury. The patient is to avoid setting with the knees higher than the hips. She is now aware of this and notes that she should be able to be more careful with her positioning and that if she is able to accomplish this, she may be able to be get by without further dislocations. The hip did seem to be overall stable. We have talked previously about revising the hip to a constrained liner. This does have risks of rocking the acetabular shell loose. She is aware of this, and she did prefer a closed reduction, and this was accomplished. Thoughts of revision were not entertained. The patient was awakened from anesthesia, transferred onto her recovery room bed and taken to recovery room in stable condition.       Ce Ball MD      [id number]  D:  07/27/2022 17:06  T:  07/27/2022 18:20  JOB #:  [id number]

## 2022-07-27 NOTE — ROUTINE PROCESS
TRANSFER - IN REPORT:    Verbal report received from Erin Gutiérrez RN on Patricia Alamo  being received from PACU for routine post - op      Report consisted of patients Situation, Background, Assessment and   Recommendations(SBAR). Information from the following report(s) SBAR and Kardex was reviewed with the receiving nurse. Opportunity for questions and clarification was provided. Assessment completed upon patients arrival to unit and care assumed.

## 2022-07-27 NOTE — PROGRESS NOTES
0703  Assumed care of pt at this time. Assessment complete. Pt alert and oriented x 4. Shows no sign of distress. Fall risk arm band in place. Denies SOB and chest pain. Pt lungs clear bilaterally. Cap refill  less than 3 seconds. Pt denies numbness and tingling to all extremities. Stated pain 1/10. Pt has 20 G IV to L forearm. Pt has no dressing skin intact. Scar to Left hip from prev Hip replacement scds and TEDs in place to BLE Incentive spirometer at bedside can reach 2500. Pt encouraged to continue use of IS. Pt verbalized understanding. Ice pack applied. Call light and possessions within reach. Bed locked and in low position. Will continue to monitor. 6480  Paged Regino Vieira in regard to pt being straight cath x2 last night and if genao needs to be placed should pt have difficulty voiding again. Pt states this is normal for her. Pt also takes Dexilant 60mg PO says she takes that everyday. Awaiting return phone call    Ryan Mendez states pt does not need to see or clear PT prior to being d/c. Pt needs to void post op and may d/c home. Telephone order with readback for 40mg of PO protonix. 1129  Pt states she is still unable to void. Will attempt again at 12pm. Otherwise no concerns.  at bedside. Possessions and callbell within reach     1206  Pt has voided post op. Pt states she is ready to d/c home after lunch. 1302  Dual AVS reviewed with Renzo BAIRD RN. All medications reviewed individually with patient. Opportunities for questions and concerns provided. Patient to be discharged via (mode of transport ie. Car, ambulance or air transport) car. Patient's arm band appropriately discarded.     IV removed

## 2022-07-27 NOTE — PROGRESS NOTES
2311  Bedside and verbal shift change report given to Beatriz Villalta RN (on coming nurse) by Mellissa Cabrera RN (off going nurse). Report included the following information SBAR, Kardex, Intake/Output and MAR.    0516  Pt still retaining. Bladder scan. 589 mL  Will do a straight cath. 0890  1st straight cath was done w/ 700 mL on 2145, 07/26/22. 2nd straight cath completed w/ 600 mL yellow/clear output at this time. Pt is concerned on urinary retention. 0708  Bedside and verbal shift change report given by GABRIELA Lacey (off going nurse) to GABRIELA Bunn(on coming nurse). Report included the following information SBAR, Kardex, Intake/Output and MAR.

## 2022-07-27 NOTE — OP NOTES
[facility]  OPERATIVE REPORT    Name:  Bill Corrales  MR#:   [id number]  :  1950  ACCOUNT #:  [de-identified]  DATE OF SERVICE:  2022    PREOPERATIVE DIAGNOSIS:  Dislocated left total hip arthroplasty. POSTOPERATIVE DIAGNOSIS:  Dislocated left total hip arthroplasty. PROCEDURE PERFORMED:  Closed reduction, left total hip arthroplasty. SURGEON:  Juliana Daniels MD    ASSISTANT:  none    ANESTHESIA:  General.    ANESTHESIOLOGIST:  Katherine Mathis. COMPLICATIONS:  none    SPECIMENS REMOVED:  none    IMPLANTS:  none    ESTIMATED BLOOD LOSS:  0 mL. INDICATIONS FOR OPERATION:  The patient had a total hip arthroplasty approximately 2 months ago. She did have a postoperative dislocation 2 days postop which was treated with revision. Subsequently, she has dislocated twice since that time. After being seen in the emergency room today after dislocation, she notes that she would prefer a closed reduction and not a revision surgery. She dislocated the hip when she was sitting on a toilet-type seat with her knees lower than her hips. Her left knee went medial and the hip slipped out at that time. At her previous dislocation, it was felt that she was dislocating posteriorly. This is consistent with the mechanism of injury described for the dislocation on 2022. PROCEDURE:  The patient was taken to the operating room after satisfactory general anesthesia was established. Image intensification was used to confirm initial position. The reduction maneuver initially consisted traction followed by internal rotation. I actually did not expect this to succeed in reducing the hip, but I did want to try this to see if possibly the dislocation could be anterior. This maneuver did not succeed in reducing the hip. The hip was placed in approximately 90 degrees of hip flexion with traction being applied.   Initially, the patient was placed in internal rotation followed by gradual external rotation. Initially, the hip was pulled on the edge of the acetabulum that did not reduce. Subsequently, prereduction was done which successfully reduced the hip. Image intensification was used both in the modified AP position with the x-ray going from superior anterior to posterior inferior, and it was followed by a modified lateral at approximately 70 degrees lateral.  Using both these positions, various positions of the hip were checked for impingement. One position that seemed to reduce the most impingement was with the hip flexed and internally rotated. This did show evidence of the trunnion getting close to the acetabular shell and this was felt to be most consistent with a posterior dislocation. The patient had been advised previously and again prior to surgery that this was felt to be the mechanism of injury. The patient is to avoid setting with the knees higher than the hips. She is now aware of this and notes that she should be able to be more careful with her positioning and that if she is able to accomplish this, she may be able to be get by without further dislocations. The hip did seem to be overall stable. We have talked previously about revising the hip to a constrained liner. This does have risks of rocking the acetabular shell loose. She is aware of this, and she did prefer a closed reduction, and this was accomplished. Thoughts of revision were not entertained. The patient was awakened from anesthesia, transferred onto her recovery room bed and taken to recovery room in stable condition.       Justice Chiang MD      [id number]  D:  07/27/2022 17:06  T:  07/27/2022 18:20  JOB #:  [id number]

## 2022-07-27 NOTE — PROGRESS NOTES
Progress Note     Patient: Cassandra Chaney MRN: [id number]  SSN: [id number]    YOB: 1950  Age: 67 y.o. Sex: female      POD:    1 Day Post-Op  S/P:    Procedure(s):  LEFT CLOSED REDUCTION HIP WITH HANA TABLE, C-ARM     Subjective:   Pt is without complaints of pain. She notes that she is waiting to urinate before discharge home since she did have to have a catheter yesterday. Objective:     Patient Vitals for the past 12 hrs:   BP Temp Pulse Resp SpO2   07/27/22 0725 116/68 98.3 °F (36.8 °C) 87 16 99 %   07/27/22 0144 (!) 138/92 97.6 °F (36.4 °C) 86 18 92 %   07/26/22 2201 129/66 98.3 °F (36.8 °C) 90 16 95 %   07/26/22 2101 124/68 98.3 °F (36.8 °C) 83 16 96 %   07/26/22 2015 129/65 97.4 °F (36.3 °C) 78 -- 96 %     Recent Labs     07/26/22  0830   HGB 10.4*   HCT 32.5*      K 3.2*   CL 99*   CO2 30   BUN 20*   CREA 1.18   *       Pt. resting in bed. Neurovascularly intact. Assessment:     Awake and alert. In good spirits. Plan:   Patient does not need to see PT/OT before discharge. Informed patient of restriction including no flexion of hip greater than 90 degrees. May discharge when able to urinate  Patient has PT and follow-up scheduled with our office.

## 2022-07-27 NOTE — PROGRESS NOTES
Problem: Falls - Risk of  Goal: *Absence of Falls  Description: Document Lencho Gareth Fall Risk and appropriate interventions in the flowsheet. Outcome: Progressing Towards Goal  Note: Fall Risk Interventions:  Mobility Interventions: Assess mobility with egress test         Medication Interventions: Teach patient to arise slowly    Elimination Interventions: Call light in reach    History of Falls Interventions:  Investigate reason for fall         Problem: Pain  Goal: *Control of Pain  Outcome: Progressing Towards Goal

## 2022-08-26 ENCOUNTER — APPOINTMENT (OUTPATIENT)
Dept: GENERAL RADIOLOGY | Age: 72
DRG: 468 | End: 2022-08-26
Attending: EMERGENCY MEDICINE
Payer: MEDICARE

## 2022-08-26 ENCOUNTER — HOSPITAL ENCOUNTER (INPATIENT)
Age: 72
LOS: 3 days | Discharge: HOME HEALTH | DRG: 468 | End: 2022-08-29
Attending: EMERGENCY MEDICINE | Admitting: INTERNAL MEDICINE
Payer: MEDICARE

## 2022-08-26 ENCOUNTER — APPOINTMENT (OUTPATIENT)
Dept: GENERAL RADIOLOGY | Age: 72
DRG: 468 | End: 2022-08-26
Attending: PHYSICIAN ASSISTANT
Payer: MEDICARE

## 2022-08-26 DIAGNOSIS — S73.005A CLOSED DISLOCATION OF LEFT HIP, INITIAL ENCOUNTER (HCC): Primary | ICD-10-CM

## 2022-08-26 PROBLEM — F32.A DEPRESSION: Status: ACTIVE | Noted: 2022-08-26

## 2022-08-26 PROBLEM — K21.9 GERD (GASTROESOPHAGEAL REFLUX DISEASE): Status: ACTIVE | Noted: 2022-08-26

## 2022-08-26 PROBLEM — I10 PRIMARY HYPERTENSION: Status: ACTIVE | Noted: 2022-08-26

## 2022-08-26 LAB
ALBUMIN SERPL-MCNC: 3.5 G/DL (ref 3.4–5)
ALBUMIN/GLOB SERPL: 1.1 {RATIO} (ref 0.8–1.7)
ALP SERPL-CCNC: 184 U/L (ref 45–117)
ALT SERPL-CCNC: 12 U/L (ref 13–56)
ANION GAP SERPL CALC-SCNC: 6 MMOL/L (ref 3–18)
AST SERPL-CCNC: 11 U/L (ref 10–38)
BASOPHILS # BLD: 0 K/UL (ref 0–0.1)
BASOPHILS NFR BLD: 0 % (ref 0–2)
BILIRUB SERPL-MCNC: 0.2 MG/DL (ref 0.2–1)
BUN SERPL-MCNC: 19 MG/DL (ref 7–18)
BUN/CREAT SERPL: 14 (ref 12–20)
CALCIUM SERPL-MCNC: 8.6 MG/DL (ref 8.5–10.1)
CHLORIDE SERPL-SCNC: 102 MMOL/L (ref 100–111)
CO2 SERPL-SCNC: 28 MMOL/L (ref 21–32)
CREAT SERPL-MCNC: 1.34 MG/DL (ref 0.6–1.3)
DIFFERENTIAL METHOD BLD: ABNORMAL
EOSINOPHIL # BLD: 0.1 K/UL (ref 0–0.4)
EOSINOPHIL NFR BLD: 1 % (ref 0–5)
ERYTHROCYTE [DISTWIDTH] IN BLOOD BY AUTOMATED COUNT: 12.7 % (ref 11.6–14.5)
GLOBULIN SER CALC-MCNC: 3.3 G/DL (ref 2–4)
GLUCOSE SERPL-MCNC: 112 MG/DL (ref 74–99)
HCT VFR BLD AUTO: 32.2 % (ref 35–45)
HGB BLD-MCNC: 10.4 G/DL (ref 12–16)
IMM GRANULOCYTES # BLD AUTO: 0 K/UL (ref 0–0.04)
IMM GRANULOCYTES NFR BLD AUTO: 0 % (ref 0–0.5)
INR PPP: 1 (ref 0.8–1.2)
LYMPHOCYTES # BLD: 1.5 K/UL (ref 0.9–3.6)
LYMPHOCYTES NFR BLD: 28 % (ref 21–52)
MCH RBC QN AUTO: 28.9 PG (ref 24–34)
MCHC RBC AUTO-ENTMCNC: 32.3 G/DL (ref 31–37)
MCV RBC AUTO: 89.4 FL (ref 78–100)
MONOCYTES # BLD: 0.3 K/UL (ref 0.05–1.2)
MONOCYTES NFR BLD: 6 % (ref 3–10)
NEUTS SEG # BLD: 3.5 K/UL (ref 1.8–8)
NEUTS SEG NFR BLD: 64 % (ref 40–73)
NRBC # BLD: 0 K/UL (ref 0–0.01)
NRBC BLD-RTO: 0 PER 100 WBC
PLATELET # BLD AUTO: 231 K/UL (ref 135–420)
PMV BLD AUTO: 9.5 FL (ref 9.2–11.8)
POTASSIUM SERPL-SCNC: 3.6 MMOL/L (ref 3.5–5.5)
PROT SERPL-MCNC: 6.8 G/DL (ref 6.4–8.2)
PROTHROMBIN TIME: 13 SEC (ref 11.5–15.2)
RBC # BLD AUTO: 3.6 M/UL (ref 4.2–5.3)
SODIUM SERPL-SCNC: 136 MMOL/L (ref 136–145)
WBC # BLD AUTO: 5.4 K/UL (ref 4.6–13.2)

## 2022-08-26 PROCEDURE — 75810000301 HC ER LEVEL 1 CLOSED TREATMNT FRACTURE/DISLOCATION

## 2022-08-26 PROCEDURE — 65270000029 HC RM PRIVATE

## 2022-08-26 PROCEDURE — 99152 MOD SED SAME PHYS/QHP 5/>YRS: CPT

## 2022-08-26 PROCEDURE — 74011000250 HC RX REV CODE- 250: Performed by: INTERNAL MEDICINE

## 2022-08-26 PROCEDURE — 74011000250 HC RX REV CODE- 250: Performed by: EMERGENCY MEDICINE

## 2022-08-26 PROCEDURE — 80053 COMPREHEN METABOLIC PANEL: CPT

## 2022-08-26 PROCEDURE — C9113 INJ PANTOPRAZOLE SODIUM, VIA: HCPCS | Performed by: INTERNAL MEDICINE

## 2022-08-26 PROCEDURE — 85025 COMPLETE CBC W/AUTO DIFF WBC: CPT

## 2022-08-26 PROCEDURE — 73502 X-RAY EXAM HIP UNI 2-3 VIEWS: CPT

## 2022-08-26 PROCEDURE — 99285 EMERGENCY DEPT VISIT HI MDM: CPT

## 2022-08-26 PROCEDURE — 73501 X-RAY EXAM HIP UNI 1 VIEW: CPT

## 2022-08-26 PROCEDURE — 74011250636 HC RX REV CODE- 250/636: Performed by: EMERGENCY MEDICINE

## 2022-08-26 PROCEDURE — 99153 MOD SED SAME PHYS/QHP EA: CPT

## 2022-08-26 PROCEDURE — 96375 TX/PRO/DX INJ NEW DRUG ADDON: CPT

## 2022-08-26 PROCEDURE — 93005 ELECTROCARDIOGRAM TRACING: CPT

## 2022-08-26 PROCEDURE — 96374 THER/PROPH/DIAG INJ IV PUSH: CPT

## 2022-08-26 PROCEDURE — 71045 X-RAY EXAM CHEST 1 VIEW: CPT

## 2022-08-26 PROCEDURE — 85610 PROTHROMBIN TIME: CPT

## 2022-08-26 PROCEDURE — 74011250636 HC RX REV CODE- 250/636: Performed by: INTERNAL MEDICINE

## 2022-08-26 PROCEDURE — 74011250637 HC RX REV CODE- 250/637: Performed by: INTERNAL MEDICINE

## 2022-08-26 RX ORDER — MORPHINE SULFATE 2 MG/ML
2 INJECTION, SOLUTION INTRAMUSCULAR; INTRAVENOUS
Status: DISCONTINUED | OUTPATIENT
Start: 2022-08-26 | End: 2022-08-27

## 2022-08-26 RX ORDER — ESCITALOPRAM OXALATE 10 MG/1
20 TABLET ORAL DAILY
Status: DISCONTINUED | OUTPATIENT
Start: 2022-08-27 | End: 2022-08-29 | Stop reason: HOSPADM

## 2022-08-26 RX ORDER — SODIUM CHLORIDE 0.9 % (FLUSH) 0.9 %
5-40 SYRINGE (ML) INJECTION AS NEEDED
Status: DISCONTINUED | OUTPATIENT
Start: 2022-08-26 | End: 2022-08-27 | Stop reason: SDUPTHER

## 2022-08-26 RX ORDER — FLUVOXAMINE MALEATE 50 MG/1
50 TABLET ORAL
Status: DISCONTINUED | OUTPATIENT
Start: 2022-08-27 | End: 2022-08-29 | Stop reason: HOSPADM

## 2022-08-26 RX ORDER — ACETAMINOPHEN 650 MG/1
650 SUPPOSITORY RECTAL
Status: DISCONTINUED | OUTPATIENT
Start: 2022-08-26 | End: 2022-08-29 | Stop reason: HOSPADM

## 2022-08-26 RX ORDER — ASPIRIN 81 MG/1
81 TABLET ORAL 2 TIMES DAILY
Status: DISCONTINUED | OUTPATIENT
Start: 2022-08-27 | End: 2022-08-27

## 2022-08-26 RX ORDER — POLYETHYLENE GLYCOL 3350 17 G/17G
17 POWDER, FOR SOLUTION ORAL DAILY PRN
Status: DISCONTINUED | OUTPATIENT
Start: 2022-08-26 | End: 2022-08-29 | Stop reason: HOSPADM

## 2022-08-26 RX ORDER — ACETAMINOPHEN 325 MG/1
650 TABLET ORAL
Status: DISCONTINUED | OUTPATIENT
Start: 2022-08-26 | End: 2022-08-29 | Stop reason: HOSPADM

## 2022-08-26 RX ORDER — CLONAZEPAM 0.5 MG/1
1 TABLET ORAL 2 TIMES DAILY
Status: DISCONTINUED | OUTPATIENT
Start: 2022-08-27 | End: 2022-08-29 | Stop reason: HOSPADM

## 2022-08-26 RX ORDER — ARIPIPRAZOLE 10 MG/1
10 TABLET ORAL
Status: DISCONTINUED | OUTPATIENT
Start: 2022-08-27 | End: 2022-08-29 | Stop reason: HOSPADM

## 2022-08-26 RX ORDER — KETAMINE HYDROCHLORIDE 50 MG/ML
2 INJECTION, SOLUTION INTRAMUSCULAR; INTRAVENOUS
Status: COMPLETED | OUTPATIENT
Start: 2022-08-26 | End: 2022-08-26

## 2022-08-26 RX ORDER — ENOXAPARIN SODIUM 100 MG/ML
40 INJECTION SUBCUTANEOUS DAILY
Status: DISCONTINUED | OUTPATIENT
Start: 2022-08-28 | End: 2022-08-27

## 2022-08-26 RX ORDER — HYDRALAZINE HYDROCHLORIDE 20 MG/ML
10 INJECTION INTRAMUSCULAR; INTRAVENOUS
Status: DISCONTINUED | OUTPATIENT
Start: 2022-08-26 | End: 2022-08-29 | Stop reason: HOSPADM

## 2022-08-26 RX ORDER — AMITRIPTYLINE HYDROCHLORIDE 50 MG/1
150 TABLET, FILM COATED ORAL
Status: DISCONTINUED | OUTPATIENT
Start: 2022-08-27 | End: 2022-08-29 | Stop reason: HOSPADM

## 2022-08-26 RX ORDER — FENTANYL CITRATE 50 UG/ML
100 INJECTION, SOLUTION INTRAMUSCULAR; INTRAVENOUS
Status: COMPLETED | OUTPATIENT
Start: 2022-08-26 | End: 2022-08-26

## 2022-08-26 RX ORDER — OXYCODONE AND ACETAMINOPHEN 5; 325 MG/1; MG/1
1 TABLET ORAL
Status: DISCONTINUED | OUTPATIENT
Start: 2022-08-26 | End: 2022-08-27

## 2022-08-26 RX ORDER — ONDANSETRON 2 MG/ML
4 INJECTION INTRAMUSCULAR; INTRAVENOUS
Status: DISCONTINUED | OUTPATIENT
Start: 2022-08-26 | End: 2022-08-29 | Stop reason: HOSPADM

## 2022-08-26 RX ORDER — ONDANSETRON 4 MG/1
4 TABLET, ORALLY DISINTEGRATING ORAL
Status: DISCONTINUED | OUTPATIENT
Start: 2022-08-26 | End: 2022-08-29 | Stop reason: HOSPADM

## 2022-08-26 RX ORDER — POTASSIUM CHLORIDE 20 MEQ/1
40 TABLET, EXTENDED RELEASE ORAL
Status: COMPLETED | OUTPATIENT
Start: 2022-08-26 | End: 2022-08-26

## 2022-08-26 RX ORDER — SODIUM CHLORIDE 0.9 % (FLUSH) 0.9 %
5-40 SYRINGE (ML) INJECTION EVERY 8 HOURS
Status: DISCONTINUED | OUTPATIENT
Start: 2022-08-26 | End: 2022-08-27 | Stop reason: SDUPTHER

## 2022-08-26 RX ORDER — ONDANSETRON 2 MG/ML
4 INJECTION INTRAMUSCULAR; INTRAVENOUS
Status: COMPLETED | OUTPATIENT
Start: 2022-08-26 | End: 2022-08-26

## 2022-08-26 RX ADMIN — SODIUM CHLORIDE 40 MG: 9 INJECTION, SOLUTION INTRAMUSCULAR; INTRAVENOUS; SUBCUTANEOUS at 23:50

## 2022-08-26 RX ADMIN — SODIUM CHLORIDE, PRESERVATIVE FREE 10 ML: 5 INJECTION INTRAVENOUS at 23:49

## 2022-08-26 RX ADMIN — ARIPIPRAZOLE 10 MG: 10 TABLET ORAL at 23:49

## 2022-08-26 RX ADMIN — FENTANYL CITRATE 100 MCG: 50 INJECTION INTRAMUSCULAR; INTRAVENOUS at 17:07

## 2022-08-26 RX ADMIN — AMITRIPTYLINE HYDROCHLORIDE 150 MG: 50 TABLET, FILM COATED ORAL at 23:49

## 2022-08-26 RX ADMIN — KETAMINE HYDROCHLORIDE 192 MG: 50 INJECTION, SOLUTION, CONCENTRATE INTRAMUSCULAR; INTRAVENOUS at 18:49

## 2022-08-26 RX ADMIN — FLUVOXAMINE MALEATE 50 MG: 50 TABLET, COATED ORAL at 23:49

## 2022-08-26 RX ADMIN — ONDANSETRON 4 MG: 2 INJECTION INTRAMUSCULAR; INTRAVENOUS at 18:44

## 2022-08-26 RX ADMIN — POTASSIUM CHLORIDE 40 MEQ: 1500 TABLET, EXTENDED RELEASE ORAL at 23:49

## 2022-08-26 NOTE — H&P
History & Physical    Patient: Peggy Ramos MRN: [id number]  [facility]: [id number]    YOB: 1950  Age: 67 y.o. Sex: female      DOA: 8/26/2022    Chief Complaint:   Chief Complaint   Patient presents with    Dislocation    Hip Injury          HPI:     Peggy Ramos is a 67 y.o.  female who has history of hypertension, GERD and depression as well as several hip dislocations since her hip replacement. Patient sustained a fall after turning abruptly and early transitioning from one room to the next she fell and was unable to get up. She called EMS and arrived to the emergency room an attempt was made to fix her dislocated hip in the emergency room it lasted less than 5 minutes and popped out again. Patient is disappointed as to since having over 7 times. She denies any chest pain shortness of breath or palpitations prior to her fall. She denies any COVID-19 exposures and she is vaccinated. I am asked to admit for surgery in the morning. Past Medical History:   Diagnosis Date    Arthritis     osteo    Difficult intubation     esophagus needs to be stretch every 2 years, had done 03/2022    GERD (gastroesophageal reflux disease)     relux, and hiatal hernia    Hypertension     Osteoarthritis of right knee 2/1/2013    Other ill-defined conditions(799.89)     hiatal hernia    Psychiatric disorder     depression    S/P total knee arthroplasty 02/04/2013       Past Surgical History:   Procedure Laterality Date    HX CATARACT REMOVAL      left eye    HX CHOLECYSTECTOMY      laparoscopic    HX COLONOSCOPY      had 2 done, f/u in 10 years    HX HEENT  04/2022    repair detached retena    HX HYSTERECTOMY      TVH    HX KNEE ARTHROSCOPY      right knee replacement    HX OOPHORECTOMY      BSO    HX ORTHOPAEDIC      manipulation of left shoulder under anesthesia       No family history on file.     Social History     Socioeconomic History    Marital status:    Tobacco Use    Smoking status: Never Smokeless tobacco: Never   Substance and Sexual Activity    Alcohol use: No    Drug use: Never       Prior to Admission medications    Medication Sig Start Date End Date Taking? Authorizing Provider   aspirin delayed-release 81 mg tablet Take 1 Tablet by mouth two (2) times a day. 5/23/22   Seda Santos PA-C   flavoxate HCl (FLAVOXATE PO) Take  by mouth. Provider, Historical   fluvoxaMINE (LUVOX) 50 mg tablet Take 50 mg by mouth nightly. Indications: depression    Provider, Historical   linaCLOtide (LINZESS) 145 mcg cap capsule Take 145 mg by mouth. Indications: IBS    Provider, Historical   clonazePAM (KlonoPIN) 1 mg tablet Take 1 mg by mouth two (2) times a day. Provider, Historical   triamterene-hydroCHLOROthiazide (DYAZIDE) 37.5-25 mg per capsule Take 1 Capsule by mouth daily. Provider, Historical   escitalopram (LEXAPRO) 20 mg tablet Take 20 mg by mouth daily. Provider, Historical   ARIPiprazole (ABILIFY) 2 mg tablet Take 10 mg by mouth nightly. Indications: depression    Provider, Historical   amitriptyline (ELAVIL) 100 mg tablet Take 100 mg by mouth two (2) times a day. Indications: helps with sleep    Provider, Historical       Allergies   Allergen Reactions    Neosporin [Benzalkonium Chloride] Other (comments)     Soreness and redness    Other Medication Shortness of Breath and Swelling     Yellow Jacket bee stings         Review of Systems  GENERAL: Patient alert, awake and oriented times 3, able to communicate full sentences and not in distress. HEENT: No change in vision, no earache, tinnitus, sore throat or sinus congestion. NECK: No pain or stiffness. PULMONARY: No shortness of breath, cough or wheeze. Cardiovascular: no pnd or orthopnea, no CP  GASTROINTESTINAL: No abdominal pain, nausea, vomiting or diarrhea, melena or bright red blood per rectum. GENITOURINARY: No urinary frequency, urgency, hesitancy or dysuria.    MUSCULOSKELETAL:+ joint or muscle pain, no back pain, +recent trauma. fall  DERMATOLOGIC: No rash, no itching, no lesions. ENDOCRINE: No polyuria, polydipsia, no heat or cold intolerance. No recent change in weight. HEMATOLOGICAL: + anemia or easy bruising or bleeding. NEUROLOGIC: No headache, seizures, numbness, tingling or weakness. Physical Exam:     Physical Exam:  Visit Vitals  BP (!) 147/77   Pulse 97   Temp 98.4 °F (36.9 °C)   Resp 14   Ht 5' 10\" (1.778 m)   Wt 96.1 kg (211 lb 14.4 oz)   SpO2 97%   BMI 30.40 kg/m²    O2 Flow Rate (L/min): 2 l/min O2 Device: Nasal cannula    Temp (24hrs), Av.2 °F (36.8 °C), Min:98 °F (36.7 °C), Max:98.4 °F (36.9 °C)    No intake/output data recorded. No intake/output data recorded. General:  Alert, cooperative, no distress, appears stated age. Well groomed with makeup               Head: Normocephalic, without obvious abnormality, atraumatic. Eyes:  Conjunctivae/corneas clear. PERRL, EOMs intact. Nose: Nares normal. No drainage or sinus tenderness. Neck: Supple, symmetrical, trachea midline, no adenopathy, thyroid: no enlargement, no carotid bruit and no JVD. Lungs:   Clear to auscultation bilaterally. Heart:  Regular rate and rhythm, S1, S2 normal.     Abdomen: Soft, non-tender. Bowel sounds normal.    Extremities: Left hip TTP shortned leg warm lower extremity    Pulses: 2+ and symmetric all extremities. Skin:  No rashes or lesions   Neurologic: AAOx3, No focal motor or sensory deficit. Labs Reviewed: All lab results for the last 24 hours reviewed.    and EKG    Procedures/imaging: see electronic medical records for all procedures/Xrays and details which were not copied into this note but were reviewed prior to creation of Plan      Assessment/Plan     Active Problems:  Left hip dislocation  She will be made n.p.o. after midnight  Pain control with morphine and Percocet  DVT prevention postsurgery per surgery  SCDs for now  She is cleared for surgery    Esophageal reflux with history of strictures  Protonix IV every 12  Aspiration Precautions    Difficult intubation in the past  Due to strictures last dilation was March 2022  Defer to anesthesia for methods of sedation    Hypertension  Well-controlled hold diuretics due to mild hypokalemia  Will replace with oral potassium today  Hydralazine as needed blood pressure    Depression  Contracts for safety  Continue home medications  DVT/GI Prophylaxis:  SCD and Protonix for now post surgery Lovenox if okay with Ortho    Hyperglycemia   not really diabetes placed on a low-carb diet  Npo after MN    Sangeeta Mclean MD  8/26/2022 7:42 PM

## 2022-08-26 NOTE — ED PROVIDER NOTES
69-year-old female with history of arthritis, hypertension, depression prior left total hip with 1 revision who has been plagued with recurrent hip dislocations on the left side presents emergency department with complaint of pain and likely dislocation. Past Medical History:   Diagnosis Date    Arthritis     osteo    Difficult intubation     esophagus needs to be stretch every 2 years, had done 03/2022    GERD (gastroesophageal reflux disease)     relux, and hiatal hernia    Hypertension     Osteoarthritis of right knee 2/1/2013    Other ill-defined conditions(799.89)     hiatal hernia    Psychiatric disorder     depression    S/P total knee arthroplasty 02/04/2013       Past Surgical History:   Procedure Laterality Date    HX CATARACT REMOVAL      left eye    HX CHOLECYSTECTOMY      laparoscopic    HX COLONOSCOPY      had 2 done, f/u in 10 years    HX HEENT  04/2022    repair detached retena    HX HYSTERECTOMY      TVH    HX KNEE ARTHROSCOPY      right knee replacement    HX OOPHORECTOMY      BSO    HX ORTHOPAEDIC      manipulation of left shoulder under anesthesia         No family history on file.     Social History     Socioeconomic History    Marital status:      Spouse name: Not on file    Number of children: Not on file    Years of education: Not on file    Highest education level: Not on file   Occupational History    Not on file   Tobacco Use    Smoking status: Never    Smokeless tobacco: Never   Substance and Sexual Activity    Alcohol use: No    Drug use: Never    Sexual activity: Not on file   Other Topics Concern    Not on file   Social History Narrative    Not on file     Social Determinants of Health     Financial Resource Strain: Not on file   Food Insecurity: Not on file   Transportation Needs: Not on file   Physical Activity: Not on file   Stress: Not on file   Social Connections: Not on file   Intimate Partner Violence: Not on file   Housing Stability: Not on file         ALLERGIES: Sisi Medel is a 55 yo F with PMHx significant for NICM (EF 35-40%), permanent AF s/p AVN RFA (6/2017), CRT-P, ESRD on HD, central adrenal insufficiency secondary to chronic corticosteroid use for treatment of pulmonary sarcoidosis, pulmonary hypertension, and chronic intermittent hypotension worse during dialysis sessions (on fludrocortisone and midodrine) who presented for weakness and orthostatic presyncope after HD. We are consulted to re-evaluate her recurrent admissions for orthostatic hypotension.     Per chart review patient was recently admitted from 3/24 - 3/26 where she was having similar complaints despite being on midodrine; found to have adrenal insufficiency. During that admission her prednisone was increased from 10 to 20 mg po daily with improvement in her symptoms.  The day after discharge she reported recurrent orthostatic hypotension and dyspnea on exertion with her activity now limited to less than a half block so she represented to the hospital.     Patient reports issues with orthostatic hypotension for the past 6 months. Describes difficulty being able to stand up from sitting position w/o feeling dizzy / unbalanced. Has had unwitnessed syncopal events that she is unable to elaborate on. She reports being on midodrine 15 mg PO once daily prior to admission. Currently on 15 mg TID and now being feeling better. Orthostatics checked at bedside this morning and negative.      Neosporin [benzalkonium chloride] and Other medication    Review of Systems   Constitutional: Negative. HENT: Negative. Respiratory: Negative. Cardiovascular: Negative. Gastrointestinal: Negative. Genitourinary: Negative. Musculoskeletal:  Positive for arthralgias, gait problem, joint swelling and myalgias. Negative for back pain, neck pain and neck stiffness. Skin: Negative. Hematological: Negative. Psychiatric/Behavioral: Negative. Vitals:    08/26/22 1558 08/26/22 1617 08/26/22 1712   BP: 122/74 116/68 132/67   Pulse: 83  80   Resp: 18 16 16   Temp: 98 °F (36.7 °C)     SpO2: 94% 96% 98%   Weight: 96.1 kg (211 lb 14.4 oz)     Height: 5' 10\" (1.778 m)              Physical Exam  Constitutional:       General: She is not in acute distress. Appearance: Normal appearance. She is obese. She is ill-appearing. She is not toxic-appearing or diaphoretic. HENT:      Head: Normocephalic and atraumatic. Mouth/Throat:      Mouth: Mucous membranes are moist.      Pharynx: Oropharynx is clear. No oropharyngeal exudate. Eyes:      General:         Right eye: No discharge. Left eye: No discharge. Extraocular Movements: Extraocular movements intact. Conjunctiva/sclera: Conjunctivae normal.      Pupils: Pupils are equal, round, and reactive to light. Cardiovascular:      Rate and Rhythm: Normal rate and regular rhythm. Pulses: Normal pulses. Heart sounds: No murmur heard. No gallop. Pulmonary:      Effort: Pulmonary effort is normal.      Breath sounds: Normal breath sounds. Abdominal:      General: Abdomen is flat. Bowel sounds are normal. There is no distension. Palpations: Abdomen is soft. There is no mass. Tenderness: There is no abdominal tenderness. Hernia: No hernia is present. Musculoskeletal:         General: Tenderness and deformity present. Skin:     General: Skin is warm and dry.       Capillary Refill: Capillary refill takes less than 2 seconds. Neurological:      General: No focal deficit present. Mental Status: She is alert and oriented to person, place, and time. Mental status is at baseline. Psychiatric:         Mood and Affect: Mood normal.         Behavior: Behavior normal.         Thought Content: Thought content normal.        MDM  Number of Diagnoses or Management Options  Diagnosis management comments: 56-year-old female presents emergency department with dislocation of the left hip which is also prosthetic hip this is happened several times in the past and is dislocated today. Dr. Carin Spangler is the patient's orthopedic doctor. Patient was seen on arrival treated for pain labs and x-rays were taken x-rays confirm a left hip dislocation. I initially was consenting patient for reduction in the emergency room she does not want the reduction done until someone is talk to her doctor Dr. Carin Spangler. I have paged Dr. Carin Spangler who did not call us back because he is not on-call this was conveyed to me by the on-call . His on-call partner Dr. Isaiah Lees did call us back and he said to proceed with reduction in the emergency room as expected as this is not an open dislocation and effort to reduce needs to be attempted before trip to the [address] [place] [address]. PT consented to procedure  MAIA River assisted in this procedure as an ENT provider secondary to the sedation. Procedure was without complications, however it was a very difficult dislocation secondary to body habitus and prosthesis. I was able to successfully reduce the hip with extreme effort 1 time when patient was moved and rolled for repeat x-ray the hip really dislocated with a visible deformity and audible clunk when they rolled her. Attempted relocation again as patient still had sedation system but was unsuccessful this time.   I again talked to Dr. Isaiah Lees about this patient feels patient may actually need a revision in all be admitted and taken to the operating room in the morning. Procedural Sedation    Date/Time: 8/26/2022 6:57 PM  Performed by: Bárbara Smith MD  Authorized by: Bárbara Smith MD     Consent:     Consent obtained:  Written    Consent given by:  Patient    Risks, benefits, and alternatives were discussed: yes      Risks discussed:   Allergic reaction, prolonged hypoxia resulting in organ damage, dysrhythmia, prolonged sedation necessitating reversal, inadequate sedation, respiratory compromise necessitating ventilatory assistance and intubation, nausea and vomiting    Alternatives discussed:  Analgesia without sedation and anxiolysis  Universal protocol:     Procedure explained and questions answered to patient or proxy's satisfaction: yes      Relevant documents present and verified: yes      Test results available: yes      Imaging studies available: yes      Required blood products, implants, devices, and special equipment available: no      Site/side marked: no      Immediately prior to procedure, a time out was called: yes      Patient identity confirmed:  Verbally with patient, hospital-assigned identification number, arm band and provided demographic data  Indications:     Procedure performed:  Dislocation reduction    Procedure necessitating sedation performed by:  Physician performing sedation    Intended level of sedation:  Moderate  Pre-sedation assessment:     Time since last food or drink:  8 hrs    ASA classification: class 2 - patient with mild systemic disease      Mouth opening:  3 or more finger widths    Thyromental distance:  4 finger widths    Mallampati score:  II - soft palate, uvula, fauces visible    Neck mobility: normal      Pre-sedation assessments completed and reviewed: airway patency, anesthesia/sedation history, cardiovascular function, mental status, nausea/vomiting, pain level, respiratory function and temperature      History of difficult intubation: no      Pre-sedation assessment completed:  8/26/2022 6:00 PM  Immediate pre-procedure details:     Reassessment: Patient reassessed immediately prior to procedure      Reviewed: vital signs, relevant labs/tests and NPO status      Verified: bag valve mask available, emergency equipment available, intubation equipment available, IV patency confirmed, oxygen available, reversal medications available and suction available    Procedure details (see MAR for exact dosages):     Sedation start time:  8/26/2022 6:30 PM    Preoxygenation:  Nasal cannula    Sedation:  Ketamine    Analgesia:  None    Intra-procedure monitoring:  Blood pressure monitoring, cardiac monitor, continuous capnometry, continuous pulse oximetry, frequent LOC assessments and frequent vital sign checks    Intra-procedure events: none      Sedation end time:  8/26/2022 7:00 PM    Total sedation time (minutes):  30  Post-procedure details:     Attendance: Constant attendance by certified staff until patient recovered      Recovery: Patient returned to pre-procedure baseline      Estimated blood loss (see I/O flowsheets): no      Post-sedation assessments completed and reviewed: airway patency, cardiovascular function, hydration status, mental status, nausea/vomiting, pain level, respiratory function and temperature      Specimens recovered:  None    Procedure completion:  Tolerated  Comments:      Was independently monitored during reduction procedure by MAIA MISHRA  Reduction of Joint    Date/Time: 8/26/2022 7:01 PM  Performed by: Love Howard MD  Authorized by: Love Howard MD     Consent:     Consent obtained:  Written    Consent given by:  Patient    Risks, benefits, and alternatives were discussed: yes      Risks discussed:  Nerve damage, pain and vascular damage    Alternatives discussed:  Delayed treatment and alternative treatment  Universal protocol:     Procedure explained and questions answered to patient or proxy's satisfaction: yes      Relevant documents present and verified: yes Test results available: yes      Imaging studies available: yes      Required blood products, implants, devices, and special equipment available: no      Site/side marked: no      Immediately prior to procedure, a time out was called: yes      Patient identity confirmed:  Arm band, provided demographic data and hospital-assigned identification number  Injury:     Injury location:  Hip    Hip injury location:  L hip  Pre-procedure details:     Distal neurologic exam:  Normal    Distal perfusion: distal pulses strong      Range of motion: reduced    Sedation:     Sedation type:   Moderate sedation  Anesthesia:     Anesthesia method:  None  Procedure details:     Manipulation performed: yes      Skin traction used: yes      Skeletal traction used: yes      Pin inserted: no      MAST used: no    Post-procedure details:     Distal neurologic exam:  Normal    Distal perfusion: distal pulses strong      Range of motion: unchanged      Procedure completion:  Procedure terminated electively by provider  Comments:      Reduction successful followed be redislocation when patient moved for xray

## 2022-08-26 NOTE — ED NOTES
Patient responds to her name. Patient is still drowsy at this time. Xray is currently at the bedside.

## 2022-08-26 NOTE — ED TRIAGE NOTES
Pt arrives via ems stretcher with c\o LEFT hip injury, pt sts she fell this afternoon, pt denies LOC, denies blood thinners, pt sts she has dislocated left hip 3 x prior, left leg shortened and internally rotated, deformity to left hip noted    Pt recvd 100 mcg fentanyl IVP en route from EMS

## 2022-08-26 NOTE — ED NOTES
Dr. Jaquelin CARVALHO, [facility] ED tech and this rN are at this patients bedside at this time.

## 2022-08-27 ENCOUNTER — APPOINTMENT (OUTPATIENT)
Dept: GENERAL RADIOLOGY | Age: 72
DRG: 468 | End: 2022-08-27
Attending: ORTHOPAEDIC SURGERY
Payer: MEDICARE

## 2022-08-27 ENCOUNTER — ANESTHESIA EVENT (OUTPATIENT)
Dept: SURGERY | Age: 72
DRG: 468 | End: 2022-08-27
Payer: MEDICARE

## 2022-08-27 ENCOUNTER — ANESTHESIA (OUTPATIENT)
Dept: SURGERY | Age: 72
DRG: 468 | End: 2022-08-27
Payer: MEDICARE

## 2022-08-27 ENCOUNTER — APPOINTMENT (OUTPATIENT)
Dept: GENERAL RADIOLOGY | Age: 72
DRG: 468 | End: 2022-08-27
Attending: PHYSICIAN ASSISTANT
Payer: MEDICARE

## 2022-08-27 LAB
ABO + RH BLD: NORMAL
ALBUMIN SERPL-MCNC: 3.4 G/DL (ref 3.4–5)
ALBUMIN/GLOB SERPL: 1.1 {RATIO} (ref 0.8–1.7)
ALP SERPL-CCNC: 194 U/L (ref 45–117)
ALT SERPL-CCNC: 12 U/L (ref 13–56)
ANION GAP SERPL CALC-SCNC: 5 MMOL/L (ref 3–18)
ANION GAP SERPL CALC-SCNC: 7 MMOL/L (ref 3–18)
AST SERPL-CCNC: 14 U/L (ref 10–38)
BASOPHILS # BLD: 0 K/UL (ref 0–0.1)
BASOPHILS NFR BLD: 0 % (ref 0–2)
BILIRUB SERPL-MCNC: 0.3 MG/DL (ref 0.2–1)
BLOOD GROUP ANTIBODIES SERPL: NORMAL
BUN SERPL-MCNC: 14 MG/DL (ref 7–18)
BUN SERPL-MCNC: 15 MG/DL (ref 7–18)
BUN/CREAT SERPL: 12 (ref 12–20)
BUN/CREAT SERPL: 13 (ref 12–20)
CALCIUM SERPL-MCNC: 8.6 MG/DL (ref 8.5–10.1)
CALCIUM SERPL-MCNC: 9.1 MG/DL (ref 8.5–10.1)
CHLORIDE SERPL-SCNC: 101 MMOL/L (ref 100–111)
CHLORIDE SERPL-SCNC: 103 MMOL/L (ref 100–111)
CO2 SERPL-SCNC: 26 MMOL/L (ref 21–32)
CO2 SERPL-SCNC: 31 MMOL/L (ref 21–32)
CREAT SERPL-MCNC: 1.15 MG/DL (ref 0.6–1.3)
CREAT SERPL-MCNC: 1.16 MG/DL (ref 0.6–1.3)
DIFFERENTIAL METHOD BLD: ABNORMAL
EOSINOPHIL # BLD: 0 K/UL (ref 0–0.4)
EOSINOPHIL NFR BLD: 1 % (ref 0–5)
ERYTHROCYTE [DISTWIDTH] IN BLOOD BY AUTOMATED COUNT: 12.8 % (ref 11.6–14.5)
GLOBULIN SER CALC-MCNC: 3.1 G/DL (ref 2–4)
GLUCOSE SERPL-MCNC: 105 MG/DL (ref 74–99)
GLUCOSE SERPL-MCNC: 168 MG/DL (ref 74–99)
HCT VFR BLD AUTO: 35.2 % (ref 35–45)
HGB BLD-MCNC: 11 G/DL (ref 12–16)
IMM GRANULOCYTES # BLD AUTO: 0 K/UL (ref 0–0.04)
IMM GRANULOCYTES NFR BLD AUTO: 0 % (ref 0–0.5)
LYMPHOCYTES # BLD: 1.2 K/UL (ref 0.9–3.6)
LYMPHOCYTES NFR BLD: 13 % (ref 21–52)
MAGNESIUM SERPL-MCNC: 2.5 MG/DL (ref 1.6–2.6)
MCH RBC QN AUTO: 28.2 PG (ref 24–34)
MCHC RBC AUTO-ENTMCNC: 31.3 G/DL (ref 31–37)
MCV RBC AUTO: 90.3 FL (ref 78–100)
MONOCYTES # BLD: 0.6 K/UL (ref 0.05–1.2)
MONOCYTES NFR BLD: 6 % (ref 3–10)
NEUTS SEG # BLD: 7 K/UL (ref 1.8–8)
NEUTS SEG NFR BLD: 79 % (ref 40–73)
NRBC # BLD: 0 K/UL (ref 0–0.01)
NRBC BLD-RTO: 0 PER 100 WBC
PLATELET # BLD AUTO: 241 K/UL (ref 135–420)
PMV BLD AUTO: 9.6 FL (ref 9.2–11.8)
POTASSIUM SERPL-SCNC: 3.7 MMOL/L (ref 3.5–5.5)
POTASSIUM SERPL-SCNC: 3.9 MMOL/L (ref 3.5–5.5)
PROT SERPL-MCNC: 6.5 G/DL (ref 6.4–8.2)
RBC # BLD AUTO: 3.9 M/UL (ref 4.2–5.3)
SODIUM SERPL-SCNC: 136 MMOL/L (ref 136–145)
SODIUM SERPL-SCNC: 137 MMOL/L (ref 136–145)
SPECIMEN EXP DATE BLD: NORMAL
WBC # BLD AUTO: 8.9 K/UL (ref 4.6–13.2)

## 2022-08-27 PROCEDURE — C1713 ANCHOR/SCREW BN/BN,TIS/BN: HCPCS | Performed by: ORTHOPAEDIC SURGERY

## 2022-08-27 PROCEDURE — 77010033678 HC OXYGEN DAILY

## 2022-08-27 PROCEDURE — 74011250636 HC RX REV CODE- 250/636: Performed by: PHYSICIAN ASSISTANT

## 2022-08-27 PROCEDURE — 85025 COMPLETE CBC W/AUTO DIFF WBC: CPT

## 2022-08-27 PROCEDURE — 74011250637 HC RX REV CODE- 250/637: Performed by: SPECIALIST

## 2022-08-27 PROCEDURE — 74011000250 HC RX REV CODE- 250: Performed by: ORTHOPAEDIC SURGERY

## 2022-08-27 PROCEDURE — 77030018831 HC SOL IRR H20 BAXT -A: Performed by: ORTHOPAEDIC SURGERY

## 2022-08-27 PROCEDURE — 77030006643: Performed by: SPECIALIST

## 2022-08-27 PROCEDURE — 77030031140 HC SUT VCRL3 J&J -A: Performed by: ORTHOPAEDIC SURGERY

## 2022-08-27 PROCEDURE — 74011250637 HC RX REV CODE- 250/637: Performed by: PHYSICIAN ASSISTANT

## 2022-08-27 PROCEDURE — 77030013708 HC HNDPC SUC IRR PULS STRY –B: Performed by: ORTHOPAEDIC SURGERY

## 2022-08-27 PROCEDURE — 77030035643 HC BLD SAW OSC PRECIS STRY -C: Performed by: ORTHOPAEDIC SURGERY

## 2022-08-27 PROCEDURE — 74011250636 HC RX REV CODE- 250/636: Performed by: INTERNAL MEDICINE

## 2022-08-27 PROCEDURE — 74011000250 HC RX REV CODE- 250: Performed by: NURSE ANESTHETIST, CERTIFIED REGISTERED

## 2022-08-27 PROCEDURE — 74011250636 HC RX REV CODE- 250/636: Performed by: SPECIALIST

## 2022-08-27 PROCEDURE — 77030031139 HC SUT VCRL2 J&J -A: Performed by: ORTHOPAEDIC SURGERY

## 2022-08-27 PROCEDURE — 77030034479 HC ADH SKN CLSR PRINEO J&J -B: Performed by: ORTHOPAEDIC SURGERY

## 2022-08-27 PROCEDURE — 77030034695 HC SCPL CANADY PLSM EXT DISP USMD -E: Performed by: ORTHOPAEDIC SURGERY

## 2022-08-27 PROCEDURE — 77030008477 HC STYL SATN SLP COVD -A: Performed by: SPECIALIST

## 2022-08-27 PROCEDURE — 74011000258 HC RX REV CODE- 258: Performed by: SPECIALIST

## 2022-08-27 PROCEDURE — 83735 ASSAY OF MAGNESIUM: CPT

## 2022-08-27 PROCEDURE — 77030002933 HC SUT MCRYL J&J -A: Performed by: ORTHOPAEDIC SURGERY

## 2022-08-27 PROCEDURE — 76060000035 HC ANESTHESIA 2 TO 2.5 HR: Performed by: ORTHOPAEDIC SURGERY

## 2022-08-27 PROCEDURE — 76210000006 HC OR PH I REC 0.5 TO 1 HR: Performed by: ORTHOPAEDIC SURGERY

## 2022-08-27 PROCEDURE — 86900 BLOOD TYPING SEROLOGIC ABO: CPT

## 2022-08-27 PROCEDURE — 0SPB0JZ REMOVAL OF SYNTHETIC SUBSTITUTE FROM LEFT HIP JOINT, OPEN APPROACH: ICD-10-PCS | Performed by: ORTHOPAEDIC SURGERY

## 2022-08-27 PROCEDURE — 80053 COMPREHEN METABOLIC PANEL: CPT

## 2022-08-27 PROCEDURE — 0SRB0JA REPLACEMENT OF LEFT HIP JOINT WITH SYNTHETIC SUBSTITUTE, UNCEMENTED, OPEN APPROACH: ICD-10-PCS | Performed by: ORTHOPAEDIC SURGERY

## 2022-08-27 PROCEDURE — 74011250636 HC RX REV CODE- 250/636: Performed by: NURSE ANESTHETIST, CERTIFIED REGISTERED

## 2022-08-27 PROCEDURE — C1776 JOINT DEVICE (IMPLANTABLE): HCPCS | Performed by: ORTHOPAEDIC SURGERY

## 2022-08-27 PROCEDURE — 2709999900 HC NON-CHARGEABLE SUPPLY: Performed by: ORTHOPAEDIC SURGERY

## 2022-08-27 PROCEDURE — 74011000250 HC RX REV CODE- 250: Performed by: INTERNAL MEDICINE

## 2022-08-27 PROCEDURE — 74011000250 HC RX REV CODE- 250: Performed by: SPECIALIST

## 2022-08-27 PROCEDURE — 77030008683 HC TU ET CUF COVD -A: Performed by: SPECIALIST

## 2022-08-27 PROCEDURE — 65270000029 HC RM PRIVATE

## 2022-08-27 PROCEDURE — 77030003666 HC NDL SPINAL BD -A: Performed by: ORTHOPAEDIC SURGERY

## 2022-08-27 PROCEDURE — 76010000131 HC OR TIME 2 TO 2.5 HR: Performed by: ORTHOPAEDIC SURGERY

## 2022-08-27 PROCEDURE — 73501 X-RAY EXAM HIP UNI 1 VIEW: CPT

## 2022-08-27 PROCEDURE — 77030018835 HC SOL IRR LR ICUM -A: Performed by: ORTHOPAEDIC SURGERY

## 2022-08-27 PROCEDURE — C9113 INJ PANTOPRAZOLE SODIUM, VIA: HCPCS | Performed by: INTERNAL MEDICINE

## 2022-08-27 PROCEDURE — 36415 COLL VENOUS BLD VENIPUNCTURE: CPT

## 2022-08-27 PROCEDURE — 77030040361 HC SLV COMPR DVT MDII -B: Performed by: ORTHOPAEDIC SURGERY

## 2022-08-27 PROCEDURE — 74011250636 HC RX REV CODE- 250/636: Performed by: ORTHOPAEDIC SURGERY

## 2022-08-27 PROCEDURE — 77030020782 HC GWN BAIR PAWS FLX 3M -B: Performed by: ORTHOPAEDIC SURGERY

## 2022-08-27 PROCEDURE — 74011250637 HC RX REV CODE- 250/637: Performed by: INTERNAL MEDICINE

## 2022-08-27 PROCEDURE — 74011000250 HC RX REV CODE- 250: Performed by: PHYSICIAN ASSISTANT

## 2022-08-27 DEVICE — LFIT V40 FEMORAL HEAD
Type: IMPLANTABLE DEVICE | Site: HIP | Status: FUNCTIONAL
Brand: V40 HEAD

## 2022-08-27 DEVICE — SCR HEX 6.5X25MM -- TRIDENT II: Type: IMPLANTABLE DEVICE | Site: HIP | Status: FUNCTIONAL

## 2022-08-27 DEVICE — CONSTRAINED ACETABULAR INSERT
Type: IMPLANTABLE DEVICE | Site: HIP | Status: FUNCTIONAL
Brand: TRIDENT

## 2022-08-27 RX ORDER — ONDANSETRON 2 MG/ML
4 INJECTION INTRAMUSCULAR; INTRAVENOUS ONCE
Status: COMPLETED | OUTPATIENT
Start: 2022-08-27 | End: 2022-08-27

## 2022-08-27 RX ORDER — CELECOXIB 100 MG/1
200 CAPSULE ORAL ONCE
Status: COMPLETED | OUTPATIENT
Start: 2022-08-27 | End: 2022-08-27

## 2022-08-27 RX ORDER — HYDROMORPHONE HYDROCHLORIDE 2 MG/ML
INJECTION, SOLUTION INTRAMUSCULAR; INTRAVENOUS; SUBCUTANEOUS AS NEEDED
Status: DISCONTINUED | OUTPATIENT
Start: 2022-08-27 | End: 2022-08-27 | Stop reason: HOSPADM

## 2022-08-27 RX ORDER — NALOXONE HYDROCHLORIDE 0.4 MG/ML
0.1 INJECTION, SOLUTION INTRAMUSCULAR; INTRAVENOUS; SUBCUTANEOUS
Status: DISCONTINUED | OUTPATIENT
Start: 2022-08-27 | End: 2022-08-29 | Stop reason: HOSPADM

## 2022-08-27 RX ORDER — HYDROMORPHONE HYDROCHLORIDE 1 MG/ML
1 INJECTION, SOLUTION INTRAMUSCULAR; INTRAVENOUS; SUBCUTANEOUS
Status: DISCONTINUED | OUTPATIENT
Start: 2022-08-27 | End: 2022-08-27

## 2022-08-27 RX ORDER — SODIUM CHLORIDE, SODIUM LACTATE, POTASSIUM CHLORIDE, CALCIUM CHLORIDE 600; 310; 30; 20 MG/100ML; MG/100ML; MG/100ML; MG/100ML
INJECTION, SOLUTION INTRAVENOUS
Status: DISCONTINUED | OUTPATIENT
Start: 2022-08-27 | End: 2022-08-27 | Stop reason: HOSPADM

## 2022-08-27 RX ORDER — CEFAZOLIN SODIUM/WATER 2 G/20 ML
2 SYRINGE (ML) INTRAVENOUS ONCE
Status: COMPLETED | OUTPATIENT
Start: 2022-08-27 | End: 2022-08-27

## 2022-08-27 RX ORDER — SODIUM CHLORIDE 0.9 % (FLUSH) 0.9 %
5-40 SYRINGE (ML) INJECTION AS NEEDED
Status: DISCONTINUED | OUTPATIENT
Start: 2022-08-27 | End: 2022-08-29 | Stop reason: HOSPADM

## 2022-08-27 RX ORDER — OXYCODONE HYDROCHLORIDE 5 MG/1
10 TABLET ORAL
Status: DISCONTINUED | OUTPATIENT
Start: 2022-08-27 | End: 2022-08-29 | Stop reason: HOSPADM

## 2022-08-27 RX ORDER — HYDROMORPHONE HYDROCHLORIDE 1 MG/ML
0.5 INJECTION, SOLUTION INTRAMUSCULAR; INTRAVENOUS; SUBCUTANEOUS
Status: DISCONTINUED | OUTPATIENT
Start: 2022-08-27 | End: 2022-08-27

## 2022-08-27 RX ORDER — SODIUM CHLORIDE 9 MG/ML
125 INJECTION, SOLUTION INTRAVENOUS CONTINUOUS
Status: DISPENSED | OUTPATIENT
Start: 2022-08-27 | End: 2022-08-28

## 2022-08-27 RX ORDER — SODIUM CHLORIDE 0.9 % (FLUSH) 0.9 %
5-40 SYRINGE (ML) INJECTION EVERY 8 HOURS
Status: DISCONTINUED | OUTPATIENT
Start: 2022-08-27 | End: 2022-08-29 | Stop reason: HOSPADM

## 2022-08-27 RX ORDER — CEFAZOLIN SODIUM/WATER 2 G/20 ML
2 SYRINGE (ML) INTRAVENOUS EVERY 8 HOURS
Status: COMPLETED | OUTPATIENT
Start: 2022-08-27 | End: 2022-08-27

## 2022-08-27 RX ORDER — MIDAZOLAM HYDROCHLORIDE 1 MG/ML
INJECTION, SOLUTION INTRAMUSCULAR; INTRAVENOUS AS NEEDED
Status: DISCONTINUED | OUTPATIENT
Start: 2022-08-27 | End: 2022-08-27 | Stop reason: HOSPADM

## 2022-08-27 RX ORDER — FENTANYL CITRATE 50 UG/ML
25 INJECTION, SOLUTION INTRAMUSCULAR; INTRAVENOUS
Status: DISCONTINUED | OUTPATIENT
Start: 2022-08-27 | End: 2022-08-29 | Stop reason: HOSPADM

## 2022-08-27 RX ORDER — ROCURONIUM BROMIDE 10 MG/ML
INJECTION, SOLUTION INTRAVENOUS AS NEEDED
Status: DISCONTINUED | OUTPATIENT
Start: 2022-08-27 | End: 2022-08-27 | Stop reason: HOSPADM

## 2022-08-27 RX ORDER — SODIUM CHLORIDE, SODIUM LACTATE, POTASSIUM CHLORIDE, CALCIUM CHLORIDE 600; 310; 30; 20 MG/100ML; MG/100ML; MG/100ML; MG/100ML
50 INJECTION, SOLUTION INTRAVENOUS CONTINUOUS
Status: DISCONTINUED | OUTPATIENT
Start: 2022-08-27 | End: 2022-08-29 | Stop reason: HOSPADM

## 2022-08-27 RX ORDER — DEXAMETHASONE SODIUM PHOSPHATE 4 MG/ML
INJECTION, SOLUTION INTRA-ARTICULAR; INTRALESIONAL; INTRAMUSCULAR; INTRAVENOUS; SOFT TISSUE AS NEEDED
Status: DISCONTINUED | OUTPATIENT
Start: 2022-08-27 | End: 2022-08-27 | Stop reason: HOSPADM

## 2022-08-27 RX ORDER — ASPIRIN 81 MG/1
81 TABLET ORAL 2 TIMES DAILY
Status: DISCONTINUED | OUTPATIENT
Start: 2022-08-27 | End: 2022-08-29 | Stop reason: HOSPADM

## 2022-08-27 RX ORDER — OXYCODONE HYDROCHLORIDE 5 MG/1
5 TABLET ORAL
Status: DISCONTINUED | OUTPATIENT
Start: 2022-08-27 | End: 2022-08-29 | Stop reason: HOSPADM

## 2022-08-27 RX ORDER — KETOROLAC TROMETHAMINE 15 MG/ML
15 INJECTION, SOLUTION INTRAMUSCULAR; INTRAVENOUS EVERY 6 HOURS
Status: COMPLETED | OUTPATIENT
Start: 2022-08-27 | End: 2022-08-28

## 2022-08-27 RX ORDER — ONDANSETRON 2 MG/ML
INJECTION INTRAMUSCULAR; INTRAVENOUS AS NEEDED
Status: DISCONTINUED | OUTPATIENT
Start: 2022-08-27 | End: 2022-08-27 | Stop reason: HOSPADM

## 2022-08-27 RX ORDER — GLYCOPYRROLATE 0.2 MG/ML
INJECTION INTRAMUSCULAR; INTRAVENOUS AS NEEDED
Status: DISCONTINUED | OUTPATIENT
Start: 2022-08-27 | End: 2022-08-27 | Stop reason: HOSPADM

## 2022-08-27 RX ORDER — FENTANYL CITRATE 50 UG/ML
INJECTION, SOLUTION INTRAMUSCULAR; INTRAVENOUS AS NEEDED
Status: DISCONTINUED | OUTPATIENT
Start: 2022-08-27 | End: 2022-08-27 | Stop reason: HOSPADM

## 2022-08-27 RX ORDER — PROPOFOL 10 MG/ML
INJECTION, EMULSION INTRAVENOUS AS NEEDED
Status: DISCONTINUED | OUTPATIENT
Start: 2022-08-27 | End: 2022-08-27 | Stop reason: HOSPADM

## 2022-08-27 RX ORDER — LANOLIN ALCOHOL/MO/W.PET/CERES
1 CREAM (GRAM) TOPICAL 3 TIMES DAILY
Status: DISCONTINUED | OUTPATIENT
Start: 2022-08-27 | End: 2022-08-29 | Stop reason: HOSPADM

## 2022-08-27 RX ORDER — OXYCODONE HYDROCHLORIDE 5 MG/1
5 TABLET ORAL
Status: DISCONTINUED | OUTPATIENT
Start: 2022-08-27 | End: 2022-08-27

## 2022-08-27 RX ORDER — LIDOCAINE HYDROCHLORIDE 20 MG/ML
INJECTION, SOLUTION EPIDURAL; INFILTRATION; INTRACAUDAL; PERINEURAL AS NEEDED
Status: DISCONTINUED | OUTPATIENT
Start: 2022-08-27 | End: 2022-08-27 | Stop reason: HOSPADM

## 2022-08-27 RX ORDER — DIPHENHYDRAMINE HYDROCHLORIDE 50 MG/ML
12.5 INJECTION, SOLUTION INTRAMUSCULAR; INTRAVENOUS
Status: DISCONTINUED | OUTPATIENT
Start: 2022-08-27 | End: 2022-08-29 | Stop reason: HOSPADM

## 2022-08-27 RX ORDER — ACETAMINOPHEN 500 MG
1000 TABLET ORAL ONCE
Status: COMPLETED | OUTPATIENT
Start: 2022-08-27 | End: 2022-08-27

## 2022-08-27 RX ORDER — ACETAMINOPHEN 325 MG/1
650 TABLET ORAL EVERY 6 HOURS
Status: DISCONTINUED | OUTPATIENT
Start: 2022-08-27 | End: 2022-08-29 | Stop reason: HOSPADM

## 2022-08-27 RX ORDER — NALOXONE HYDROCHLORIDE 0.4 MG/ML
0.4 INJECTION, SOLUTION INTRAMUSCULAR; INTRAVENOUS; SUBCUTANEOUS AS NEEDED
Status: DISCONTINUED | OUTPATIENT
Start: 2022-08-27 | End: 2022-08-29 | Stop reason: HOSPADM

## 2022-08-27 RX ADMIN — TRANEXAMIC ACID 1 G: 100 INJECTION, SOLUTION INTRAVENOUS at 08:54

## 2022-08-27 RX ADMIN — GLYCOPYRROLATE 0.1 MG: 0.2 INJECTION INTRAMUSCULAR; INTRAVENOUS at 08:34

## 2022-08-27 RX ADMIN — SODIUM CHLORIDE 125 ML/HR: 9 INJECTION, SOLUTION INTRAVENOUS at 17:45

## 2022-08-27 RX ADMIN — ACETAMINOPHEN 650 MG: 325 TABLET ORAL at 12:57

## 2022-08-27 RX ADMIN — HYDROMORPHONE HYDROCHLORIDE 0.25 MG: 2 INJECTION, SOLUTION INTRAMUSCULAR; INTRAVENOUS; SUBCUTANEOUS at 09:23

## 2022-08-27 RX ADMIN — CLONAZEPAM 1 MG: 0.5 TABLET ORAL at 12:57

## 2022-08-27 RX ADMIN — PROPOFOL 150 MG: 10 INJECTION, EMULSION INTRAVENOUS at 08:42

## 2022-08-27 RX ADMIN — HYDROMORPHONE HYDROCHLORIDE 0.25 MG: 2 INJECTION, SOLUTION INTRAMUSCULAR; INTRAVENOUS; SUBCUTANEOUS at 09:04

## 2022-08-27 RX ADMIN — HYDROMORPHONE HYDROCHLORIDE 1 MG: 1 INJECTION, SOLUTION INTRAMUSCULAR; INTRAVENOUS; SUBCUTANEOUS at 05:54

## 2022-08-27 RX ADMIN — FENTANYL CITRATE 50 MCG: 50 INJECTION, SOLUTION INTRAMUSCULAR; INTRAVENOUS at 08:55

## 2022-08-27 RX ADMIN — FERROUS SULFATE TAB 325 MG (65 MG ELEMENTAL FE) 325 MG: 325 (65 FE) TAB at 22:58

## 2022-08-27 RX ADMIN — ASPIRIN 81 MG: 81 TABLET, COATED ORAL at 22:58

## 2022-08-27 RX ADMIN — SODIUM CHLORIDE, PRESERVATIVE FREE 10 ML: 5 INJECTION INTRAVENOUS at 23:04

## 2022-08-27 RX ADMIN — ROCURONIUM BROMIDE 15 MG: 10 INJECTION, SOLUTION INTRAVENOUS at 09:43

## 2022-08-27 RX ADMIN — CELECOXIB 200 MG: 100 CAPSULE ORAL at 08:00

## 2022-08-27 RX ADMIN — ACETAMINOPHEN 650 MG: 325 TABLET ORAL at 23:00

## 2022-08-27 RX ADMIN — TRANEXAMIC ACID 1 G: 100 INJECTION, SOLUTION INTRAVENOUS at 10:13

## 2022-08-27 RX ADMIN — ROCURONIUM BROMIDE 35 MG: 10 INJECTION, SOLUTION INTRAVENOUS at 08:42

## 2022-08-27 RX ADMIN — FERROUS SULFATE TAB 325 MG (65 MG ELEMENTAL FE) 325 MG: 325 (65 FE) TAB at 17:10

## 2022-08-27 RX ADMIN — ONDANSETRON 4 MG: 2 INJECTION INTRAMUSCULAR; INTRAVENOUS at 12:57

## 2022-08-27 RX ADMIN — SODIUM CHLORIDE 40 MG: 9 INJECTION, SOLUTION INTRAMUSCULAR; INTRAVENOUS; SUBCUTANEOUS at 12:58

## 2022-08-27 RX ADMIN — KETOROLAC TROMETHAMINE 15 MG: 15 INJECTION, SOLUTION INTRAMUSCULAR; INTRAVENOUS at 18:30

## 2022-08-27 RX ADMIN — MIDAZOLAM 2 MG: 1 INJECTION INTRAMUSCULAR; INTRAVENOUS at 08:34

## 2022-08-27 RX ADMIN — ASPIRIN 81 MG: 81 TABLET, COATED ORAL at 12:57

## 2022-08-27 RX ADMIN — HYDROMORPHONE HYDROCHLORIDE 0.25 MG: 2 INJECTION, SOLUTION INTRAMUSCULAR; INTRAVENOUS; SUBCUTANEOUS at 09:12

## 2022-08-27 RX ADMIN — ARIPIPRAZOLE 10 MG: 10 TABLET ORAL at 22:58

## 2022-08-27 RX ADMIN — DEXAMETHASONE SODIUM PHOSPHATE 6 MG: 4 INJECTION, SOLUTION INTRAMUSCULAR; INTRAVENOUS at 09:00

## 2022-08-27 RX ADMIN — FENTANYL CITRATE 50 MCG: 50 INJECTION, SOLUTION INTRAMUSCULAR; INTRAVENOUS at 08:41

## 2022-08-27 RX ADMIN — SODIUM CHLORIDE 40 MG: 9 INJECTION, SOLUTION INTRAMUSCULAR; INTRAVENOUS; SUBCUTANEOUS at 22:54

## 2022-08-27 RX ADMIN — SUGAMMADEX 150 MG: 100 INJECTION, SOLUTION INTRAVENOUS at 10:25

## 2022-08-27 RX ADMIN — SODIUM CHLORIDE, PRESERVATIVE FREE 10 ML: 5 INJECTION INTRAVENOUS at 05:55

## 2022-08-27 RX ADMIN — ESCITALOPRAM OXALATE 20 MG: 10 TABLET ORAL at 12:56

## 2022-08-27 RX ADMIN — CEFAZOLIN 2 G: 10 INJECTION, POWDER, FOR SOLUTION INTRAVENOUS at 17:10

## 2022-08-27 RX ADMIN — CLONAZEPAM 1 MG: 0.5 TABLET ORAL at 22:58

## 2022-08-27 RX ADMIN — ACETAMINOPHEN 650 MG: 325 TABLET ORAL at 18:30

## 2022-08-27 RX ADMIN — ONDANSETRON HYDROCHLORIDE 4 MG: 2 INJECTION INTRAMUSCULAR; INTRAVENOUS at 10:03

## 2022-08-27 RX ADMIN — FLUVOXAMINE MALEATE 50 MG: 50 TABLET, COATED ORAL at 22:59

## 2022-08-27 RX ADMIN — SODIUM CHLORIDE, SODIUM LACTATE, POTASSIUM CHLORIDE, AND CALCIUM CHLORIDE: 600; 310; 30; 20 INJECTION, SOLUTION INTRAVENOUS at 08:34

## 2022-08-27 RX ADMIN — SODIUM CHLORIDE, PRESERVATIVE FREE 10 ML: 5 INJECTION INTRAVENOUS at 13:00

## 2022-08-27 RX ADMIN — ACETAMINOPHEN 1000 MG: 500 TABLET ORAL at 08:00

## 2022-08-27 RX ADMIN — MORPHINE SULFATE 2 MG: 2 INJECTION, SOLUTION INTRAMUSCULAR; INTRAVENOUS at 03:16

## 2022-08-27 RX ADMIN — CEFAZOLIN 2 G: 10 INJECTION, POWDER, FOR SOLUTION INTRAVENOUS at 23:04

## 2022-08-27 RX ADMIN — Medication 2 G: at 08:34

## 2022-08-27 RX ADMIN — AMITRIPTYLINE HYDROCHLORIDE 150 MG: 50 TABLET, FILM COATED ORAL at 22:59

## 2022-08-27 RX ADMIN — KETOROLAC TROMETHAMINE 15 MG: 15 INJECTION, SOLUTION INTRAMUSCULAR; INTRAVENOUS at 23:04

## 2022-08-27 RX ADMIN — MORPHINE SULFATE 2 MG: 2 INJECTION, SOLUTION INTRAMUSCULAR; INTRAVENOUS at 00:02

## 2022-08-27 RX ADMIN — HYDROMORPHONE HYDROCHLORIDE 0.25 MG: 2 INJECTION, SOLUTION INTRAMUSCULAR; INTRAVENOUS; SUBCUTANEOUS at 09:40

## 2022-08-27 RX ADMIN — LIDOCAINE HYDROCHLORIDE 60 MG: 20 INJECTION, SOLUTION INTRAVENOUS at 08:41

## 2022-08-27 RX ADMIN — CLONAZEPAM 1 MG: 0.5 TABLET ORAL at 00:02

## 2022-08-27 RX ADMIN — KETOROLAC TROMETHAMINE 15 MG: 15 INJECTION, SOLUTION INTRAMUSCULAR; INTRAVENOUS at 12:58

## 2022-08-27 NOTE — PERIOP NOTES
Contacted RACH Thibodeaux RN aware that SBAR is ready and will inform family that patient is out of surgery and will be returning to room shortly.

## 2022-08-27 NOTE — PERIOP NOTES
Radiology contacted to come to bedside. Attempted to contact the family for an update phone went straight to voicemail unable to leave message. Will try again.

## 2022-08-27 NOTE — ANESTHESIA POSTPROCEDURE EVALUATION
Post-Anesthesia Evaluation and Assessment    Cardiovascular Function/Vital Signs  Visit Vitals  /71   Pulse 92   Temp 36.2 °C (97.2 °F)   Resp 10   Ht 5' 10\" (1.778 m)   Wt 96.1 kg (211 lb 14.4 oz)   SpO2 99%   Breastfeeding No   BMI 30.40 kg/m²       Patient is status post Procedure(s):  LEFT TOTAL HIP REVISION WITH C-ARM AND SOPHIA. Nausea/Vomiting: Controlled. Postoperative hydration reviewed and adequate. Pain:  Pain Scale 1: Numeric (0 - 10) (08/27/22 1107)  Pain Intensity 1: 0 (08/27/22 1107)   Managed. Neurological Status: At baseline. Mental Status and Level of Consciousness: Arousable. Pulmonary Status:   O2 Device: Nasal cannula (08/27/22 1113)   Adequate oxygenation and airway patent. Complications related to anesthesia: None    Post-anesthesia assessment completed. No concerns. Patient has met all discharge requirements.     Signed By: Jovi Moore CRNA    August 27, 2022

## 2022-08-27 NOTE — PERIOP NOTES
Holding in holding bay as radiology tech requests to repeat xray after patietn removed from PACU bay.  O2 Sat monitor on , sats 98%

## 2022-08-27 NOTE — PROGRESS NOTES
Patient arrived to floor via stretcher, accompanied by staff. Transferred to bed without difficulty. Complete CHG bath, gown change, PureWick application, and positioning with pillows for comfort. Patient resting quietly, NAD noted.  Call bell within reach, lights dimmed for rest.

## 2022-08-27 NOTE — PERIOP NOTES
TRANSFER - IN REPORT:    Verbal report received from Imani Sandoval RN(name) on Hellen Saint  being received from 30(unit) for routine progression of care      Report consisted of patients Situation, Background, Assessment and   Recommendations(SBAR). Information from the following report(s) OR Summary was reviewed with the receiving nurse. Opportunity for questions and clarification was provided. Assessment completed upon patients arrival to unit and care assumed.

## 2022-08-27 NOTE — PERIOP NOTES
Intake/Output Summary (Last 24 hours) at 8/27/2022 1123  Last data filed at 8/27/2022 1100  Gross per 24 hour   Intake 4550 ml   Output 2700 ml   Net 1850 ml

## 2022-08-27 NOTE — ANESTHESIA PREPROCEDURE EVALUATION
Relevant Problems   NEUROLOGY   (+) Depression      CARDIOVASCULAR   (+) Primary hypertension      GASTROINTESTINAL   (+) GERD (gastroesophageal reflux disease)       Anesthetic History   No history of anesthetic complications     Pertinent negatives: No increased risk of difficult airway       Review of Systems / Medical History  Patient summary reviewed, nursing notes reviewed and pertinent labs reviewed    Pulmonary  Within defined limits                 Neuro/Psych   Within defined limits           Cardiovascular    Hypertension          Hyperlipidemia    Exercise tolerance: >4 METS     GI/Hepatic/Renal     GERD           Endo/Other        Arthritis     Other Findings              Physical Exam    Airway  Mallampati: II  TM Distance: 4 - 6 cm  Neck ROM: normal range of motion   Mouth opening: Normal     Cardiovascular               Dental    Dentition: Edentulous     Pulmonary                 Abdominal         Other Findings            Anesthetic Plan    ASA: 2, emergent  Anesthesia type: general          Induction: Intravenous  Anesthetic plan and risks discussed with: Patient and Spouse      I see mention of difficult airway but sounds more related to GI issue - esophageal stricture. She had no airway issues during primary procedure a few months ago.

## 2022-08-27 NOTE — ED NOTES
Pt A&O x4  Educated r/t new plan of care. VSS, Pt able to maintain SPO2 without NC.  remains bedside.

## 2022-08-27 NOTE — PERIOP NOTES
TRANSFER - OUT REPORT:    Verbal report given to Ghada RN(name) on Anuj Schaefer  being transferred to [address] [place] [address](unit) for routine progression of care       Report consisted of patients Situation, Background, Assessment and   Recommendations(SBAR). Information from the following report(s) OR Summary and Procedure Summary was reviewed with the receiving nurse. Lines:   Peripheral IV 08/26/22 Left Antecubital (Active)   Site Assessment Clean, dry, & intact 08/27/22 0922   Phlebitis Assessment 0 08/27/22 0922   Infiltration Assessment 0 08/27/22 0922   Dressing Status Clean, dry, & intact 08/27/22 0922   Dressing Type Transparent 08/27/22 0922   Hub Color/Line Status Pink;Capped 08/27/22 0113   Action Taken Dressing reinforced 08/27/22 0113   Alcohol Cap Used Yes 08/27/22 0113        Opportunity for questions and clarification was provided.       Patient transported with:   O2 @ 2 liters

## 2022-08-27 NOTE — PROGRESS NOTES
Hospitalist Progress Note    Patient: Krystal Magallon MRN: [id number]  [facility]: [id number]    YOB: 1950  Age: 67 y.o. Sex: female    DOA: 8/26/2022 LOS:  LOS: 1 day            Assessment/Plan     Principal Problem:    Hip dislocation, left, initial encounter ([facility] [address] (7/11/2022)    Active Problems:    Primary hypertension (8/26/2022)      Depression (8/26/2022)      GERD (gastroesophageal reflux disease) (8/26/2022)      Left hip dislocation: NPO for surgery this am.  Pain control with morphine and Percocet  DVT prevention postsurgery per surgery  SCDs for now    Esophageal reflux with history of strictures  Protonix IV every 12  Aspiration Precautions     Difficult intubation in the past: Due to strictures last dilation was March 2022  Defer to anesthesia for methods of sedation     Hypertension: Well-controlled hold diuretics due to mild hypokalemia  Will replace with oral potassium today  Hydralazine as needed blood pressure     Depression: Contracts for safety  Continue home medications  DVT/GI Prophylaxis:  SCD and Protonix for now post surgery Lovenox if okay with Ortho     Hyperglycemia : Monitor BP    Continue inpatient care    CC:  Left hip dislocation          Subjective:     Pt was seen and examined with the nurse in the morning round. No acute event overnight. Just came back from surgery. Her  was at the bedside. Review of systems  General: No fevers or chills. Cardiovascular: No chest pain or pressure. No palpitations. Pulmonary: No cough, SOB  Gastrointestinal: No nausea, vomiting. Objective:      Visit Vitals  /71 (BP 1 Location: Left upper arm)   Pulse 93   Temp 98.2 °F (36.8 °C)   Resp 14   Ht 5' 10\" (1.778 m)   Wt 96.1 kg (211 lb 14.4 oz)   SpO2 100%   Breastfeeding No   BMI 30.40 kg/m²       Physical Exam:    Gen: NAD, non-toxic. Heent:  MMM, NC, AT. Cor: s1s2 RRR. No MRG. PMI mid 5th intercostal space. Resp:  CTA b/l. No w/r/r.   Nml effort and diaphragmatic excursion. Abd:  NT ND.  BS positive. No rebound or guarding. No masses. Ext: TTP to L. hip      Intake and Output:  Current Shift:  08/27 0701 - 08/27 1900  In: 4300 [I.V.:4300]  Out: 2700 [Urine:2200]  Last three shifts:  08/25 1901 - 08/27 0700  In: 250 [P.O.:250]  Out: -     Labs: Results:       Chemistry Recent Labs     08/27/22  0612 08/26/22  1610   * 112*    136   K 3.9 3.6    102   CO2 31 28   BUN 15 19*   CREA 1.16 1.34*   CA 9.1 8.6   AGAP 5 6   BUCR 13 14   * 184*   TP 6.5 6.8   ALB 3.4 3.5   GLOB 3.1 3.3   AGRAT 1.1 1.1      CBC w/Diff Recent Labs     08/27/22  0612 08/26/22  1610   WBC 8.9 5.4   RBC 3.90* 3.60*   HGB 11.0* 10.4*   HCT 35.2 32.2*    231   GRANS 79* 64   LYMPH 13* 28   EOS 1 1      Cardiac Enzymes No results for input(s): CPK, CKND1, GREGORIO in the last 72 hours. No lab exists for component: CKRMB, TROIP   Coagulation Recent Labs     08/26/22  1610   PTP 13.0   INR 1.0       Lipid Panel No results found for: CHOL, CHOLPOCT, CHOLX, CHLST, CHOLV, 863210, HDL, HDLP, LDL, LDLC, DLDLP, 281871, VLDLC, VLDL, TGLX, TRIGL, TRIGP, TGLPOCT, CHHD, CHHDX   BNP No results for input(s): BNPP in the last 72 hours.    Liver Enzymes Recent Labs     08/27/22  0612   TP 6.5   ALB 3.4   *      Thyroid Studies No results found for: T4, T3U, TSH, TSHEXT     Procedures/imaging: see electronic medical records for all procedures/Xrays and details which were not copied into this note but were reviewed prior to creation of Plan      Medications Reviewed  Philip Barney MD

## 2022-08-27 NOTE — PROGRESS NOTES
Patient returned from [facility] is a little lethargic, vitals stable, pain under control,  at bedside, report received  from Chantel Alarcon, will continue to monitor.

## 2022-08-27 NOTE — PROGRESS NOTES
Problem: Falls - Risk of  Goal: *Absence of Falls  Description: Document Ronaldo Rodriguez Fall Risk and appropriate interventions in the flowsheet. Outcome: Progressing Towards Goal  Note: Fall Risk Interventions:  Mobility Interventions: Bed/chair exit alarm, PT Consult for mobility concerns         Medication Interventions: Bed/chair exit alarm, Patient to call before getting OOB, Teach patient to arise slowly    Elimination Interventions: Call light in reach    History of Falls Interventions: Bed/chair exit alarm, Investigate reason for fall, Room close to nurse's station         Problem: Patient Education: Go to Patient Education Activity  Goal: Patient/Family Education  Outcome: Progressing Towards Goal     Problem: General Medical Care Plan  Goal: *Vital signs within specified parameters  Outcome: Progressing Towards Goal  Goal: *Labs within defined limits  Outcome: Progressing Towards Goal  Goal: *Absence of infection signs and symptoms  Outcome: Progressing Towards Goal  Goal: *Optimal pain control at patient's stated goal  Outcome: Progressing Towards Goal  Goal: *Skin integrity maintained  Outcome: Progressing Towards Goal  Goal: *Fluid volume balance  Outcome: Progressing Towards Goal  Goal: *Optimize nutritional status  Outcome: Progressing Towards Goal  Goal: *Anxiety reduced or absent  Outcome: Progressing Towards Goal  Goal: *Progressive mobility and function (eg: ADL's)  Outcome: Progressing Towards Goal     Problem: Patient Education: Go to Patient Education Activity  Goal: Patient/Family Education  Outcome: Progressing Towards Goal     Problem: Pressure Injury - Risk of  Goal: *Prevention of pressure injury  Description: Document North Scale and appropriate interventions in the flowsheet.   Outcome: Progressing Towards Goal     Problem: Patient Education: Go to Patient Education Activity  Goal: Patient/Family Education  Outcome: Progressing Towards Goal

## 2022-08-27 NOTE — PROGRESS NOTES
D/C plan: home with [facility] [place] vs SNF pending medial progression. Chart Review Assessment completed as pt is in the OR. Pt arrived to the ED from home via stretcher due to dislocated L hip. PT/OT evals s/p surgery. CM is following to assist with d/c planning needs. Reason for Admission:   Hip dislocation, left ([address] [S73.005A]    PCP: Laly Garg MD    There are currently no Active Isolations  No active infections                RUR Score:     16%             Resources/supports,as identified by patient/family:   Spouse      Barriers to discharge: OR today for disclocated L hip. PT/OT evals s/p surgery. Plan for utilizing home health:    TBD                          Likelihood of readmission:   Moderate         Transition of Care Plan:         Home with [place] vs SNF           Initial assessment completed with past medical records. Cognitive status of patient: oriented to time, place, person and situation. This patient lives in a single family home with spouse. Patient is able to navigate steps as needed. Prior to hospitalization, patient was considered to be independent with ADLs/IADLS : yes . This patient is on dialysis/days :no      Patient's current insurance is Payor: [place] MED[place]ARE / Plan: VA MEDICARE PART A & B / Product Type: Medicare /        Care Management Interventions  PCP Verified by CM: Yes (Per Chart: Dr. Bowen Constantino)  Palliative Care Criteria Met (RRAT>21 & CHF Dx)?: No  Mode of Transport at Discharge:  Other (see comment) (BLS vs family TBD)  Transition of Care Consult (CM Consult): Home Health, SNF (Anticipate d/c home w/ HH vs SNF )  Physical Therapy Consult: Yes (s/p surgery. )  Occupational Therapy Consult: Yes (s/p surgery.)  Speech Therapy Consult: No  Support Systems: Spouse/Significant Other  Confirm Follow Up Transport: Family  The Plan for Transition of Care is Related to the Following Treatment Goals : Anticipate home w/ [facility] [place] vs SNF pending medical progression.   Discharge Location  Patient Expects to be Discharged to[de-identified] Skilled nursing facility

## 2022-08-27 NOTE — PROGRESS NOTES
8/27/2022 PT note: consult received and chart reviewed. Evaluation attempted. Pt drowsy with difficulty keeping eyes open during conversation. Attempted to obtain information on PLOF and home situation, however, pt giving incorrect information and spouse provided correction. Will hold PT at this time due to pt drowsiness (no pain however). Will f/u acordingly.  Thank you for this referral.   Lottie Morales, PT

## 2022-08-27 NOTE — PROGRESS NOTES
Patient complaining of having to urinate, unable to go while purewick is in place and unable to use the bedpan. Patient bladder scan revealed over 999cc,Spoke to  who advised this nurse to send patient down to OR  and genao will be placed.

## 2022-08-27 NOTE — CONSULTS
Consultation Note    Assessment:     Recurrent posterior left LAURA    Principal Problem:    Hip dislocation, left, initial encounter ([facility] [address] (7/11/2022)    Active Problems:    Primary hypertension (8/26/2022)      Depression (8/26/2022)      GERD (gastroesophageal reflux disease) (8/26/2022)        Plan:     Revision Left LAURA    Subjective:     I was asked  to evaluate Braeden for her recurrent dislocating LAURA. She  is a 67 y.o. female admitted on 8/26/2022 for Hip dislocation, left ([facility] [address] [S73.005A].       Allergies   Allergen Reactions    Neosporin [Benzalkonium Chloride] Other (comments)     Soreness and redness    Other Medication Shortness of Breath and Swelling     Yellow Jacket bee stings       Current Facility-Administered Medications   Medication Dose Route Frequency Provider Last Rate Last Admin    HYDROmorphone (DILAUDID) injection 1 mg  1 mg IntraVENous Q3H PRN Tiffany Bolanos MD   1 mg at 08/27/22 0554    acetaminophen (TYLENOL) tablet 1,000 mg  1,000 mg Oral Monica Pollack MD        celecoxib (CELEBREX) capsule 200 mg  200 mg Oral Monica Pollack MD        oxyCODONE IR (ROXICODONE) tablet 5 mg  5 mg Oral ONCE PRN Olu Aranda MD        ceFAZolin (ANCEF) 2 g/20 mL in sterile water IV syringe  2 g IntraVENous ONCE Elba Schmitz PA        sodium chloride (NS) flush 5-40 mL  5-40 mL IntraVENous Q8H Tiffany Bolanos MD   10 mL at 08/27/22 0555    sodium chloride (NS) flush 5-40 mL  5-40 mL IntraVENous PRN Tiffany Bolanos MD        acetaminophen (TYLENOL) tablet 650 mg  650 mg Oral Q6H PRN Tiffany Bolanos MD        Or    acetaminophen (TYLENOL) suppository 650 mg  650 mg Rectal Q6H PRN Tiffany Bolanos MD        polyethylene glycol (MIRALAX) packet 17 g  17 g Oral DAILY PRN Tiffany Bolanos MD        ondansetron (ZOFRAN ODT) tablet 4 mg  4 mg Oral Q8H PRN Tiffany Bolanos MD        Or    ondansetron (ZOFRAN) injection 4 mg  4 mg IntraVENous Q6H PRN Angela Bolanos MD        [START ON 8/28/2022] enoxaparin (LOVENOX) injection 40 mg  40 mg SubCUTAneous DAILY Angela Bolanos MD        oxyCODONE-acetaminophen (PERCOCET) 5-325 mg per tablet 1 Tablet  1 Tablet Oral Q4H PRN Angela Bolanos MD        pantoprazole (PROTONIX) 40 mg in 0.9% sodium chloride 10 mL injection  40 mg IntraVENous Q12H Angela Bolanos MD   40 mg at 08/26/22 2350    hydrALAZINE (APRESOLINE) 20 mg/mL injection 10 mg  10 mg IntraVENous Q6H PRN Angela Bolanos MD        escitalopram oxalate (LEXAPRO) tablet 20 mg  20 mg Oral DAILY Angela Bolanos MD        clonazePAM (KlonoPIN) tablet 1 mg  1 mg Oral BID Angela Bolanos MD   1 mg at 08/27/22 0002    ARIPiprazole (ABILIFY) tablet 10 mg  10 mg Oral QHS Angela Bolanos MD   10 mg at 08/26/22 2349    amitriptyline (ELAVIL) tablet 150 mg  150 mg Oral QHS Angela Bolanos MD   150 mg at 08/26/22 2349    linaCLOtide (LINZESS) capsule 145 mcg (Patient Supplied)  145 mcg Oral ACB Angela Bolanos MD        fluvoxaMINE (LUVOX) tablet 50 mg  50 mg Oral QHS Angela Bolanos MD   50 mg at 08/26/22 2349       Past Medical History:   Diagnosis Date    Arthritis     osteo    Difficult intubation     esophagus needs to be stretch every 2 years, had done 03/2022    GERD (gastroesophageal reflux disease)     relux, and hiatal hernia    Hypertension     Osteoarthritis of right knee 2/1/2013    Other ill-defined conditions(799.89)     hiatal hernia    Psychiatric disorder     depression    S/P total knee arthroplasty 02/04/2013     Past Surgical History:   Procedure Laterality Date    HX CATARACT REMOVAL      left eye    HX CHOLECYSTECTOMY      laparoscopic    HX COLONOSCOPY      had 2 done, f/u in 10 years    HX HEENT  04/2022    repair detached retena    HX HYSTERECTOMY      TVH    HX KNEE ARTHROSCOPY      right knee replacement    HX OOPHORECTOMY      BSO    HX ORTHOPAEDIC manipulation of left shoulder under anesthesia     History reviewed. No pertinent family history. Social History     Socioeconomic History    Marital status:    Tobacco Use    Smoking status: Never    Smokeless tobacco: Never   Substance and Sexual Activity    Alcohol use: No    Drug use: Never       Constitutional:  No Fever, chills, sweats; No weight loss    Skin:  No rash, ulcers; No itching   HEENT:  No changes in vision or hearing. No difficulty swallowing   Cardiovascular:  No CP, pressure, palpitations. No neck, jaw, or arm pain. No nausea or diaphoresis. No SOB, TRIVEDI, orthopnea, or PND. Respiratory:  Pt reports no cough, wheezing or SOB. No sputum production   Gastrointestinal:  No nausea, vomiting, constipation, or diarrhea. No abdominal pain. No passage of blood, mucus, or melena. Genitourinary:  No dysuria; No urinary frequency or urgency   Musculoskeletal:  No joint pain, redness, or swelling. No back pain. Endo:  No polyuria; No heat or cold intolerance   Heme:  No bruising; No bleeding. No unexplained fevers   Neurological:  No headache  No focal motor or sensory symptoms. No slurred speech or word finding difficulties.      Psychiatric:  No depression, anxiety, or insomnia       Vitals:    08/26/22 2002 08/26/22 2104 08/26/22 2243 08/27/22 0247   BP: (!) 144/77 (!) 153/75 (!) 148/74 (!) 144/69   Pulse: 90 92 92 92   Resp:  14 14 15   Temp:  98.2 °F (36.8 °C) 97.7 °F (36.5 °C) 97.9 °F (36.6 °C)   SpO2:  100% 95% 96%   Weight:       Height:           General appearance: alert, cooperative, no distress, appears stated age  Lungs: normal respiratory effort  Heart: normal apical impulse  Abdomen: soft  The LLE is shortened w/ no ROM      Labs: Results:   CBC Recent Labs     08/27/22  0612   WBC 8.9   HCT 35.2   MCV 90.3      Chemistry Recent Labs     08/27/22  0612 08/26/22  1610    136   CO2 31 28   BUN 15 19*   INR  --  1.0       Recent Labs     08/27/22  0612   AGRAT 1.1 Urine    POC Glucose         Radiographs: Dislocated LAURA, left . . components in good position.      Lesa Mota MD     Recurrent

## 2022-08-28 LAB
ALBUMIN SERPL-MCNC: 2.7 G/DL (ref 3.4–5)
ALBUMIN/GLOB SERPL: 1 {RATIO} (ref 0.8–1.7)
ALP SERPL-CCNC: 155 U/L (ref 45–117)
ALT SERPL-CCNC: 12 U/L (ref 13–56)
ANION GAP SERPL CALC-SCNC: 8 MMOL/L (ref 3–18)
AST SERPL-CCNC: 18 U/L (ref 10–38)
BASOPHILS # BLD: 0 K/UL (ref 0–0.1)
BASOPHILS NFR BLD: 0 % (ref 0–2)
BILIRUB SERPL-MCNC: 0.2 MG/DL (ref 0.2–1)
BUN SERPL-MCNC: 18 MG/DL (ref 7–18)
BUN/CREAT SERPL: 14 (ref 12–20)
CALCIUM SERPL-MCNC: 8.2 MG/DL (ref 8.5–10.1)
CHLORIDE SERPL-SCNC: 103 MMOL/L (ref 100–111)
CO2 SERPL-SCNC: 25 MMOL/L (ref 21–32)
CREAT SERPL-MCNC: 1.29 MG/DL (ref 0.6–1.3)
DIFFERENTIAL METHOD BLD: ABNORMAL
EOSINOPHIL # BLD: 0 K/UL (ref 0–0.4)
EOSINOPHIL NFR BLD: 0 % (ref 0–5)
ERYTHROCYTE [DISTWIDTH] IN BLOOD BY AUTOMATED COUNT: 12.8 % (ref 11.6–14.5)
GLOBULIN SER CALC-MCNC: 2.7 G/DL (ref 2–4)
GLUCOSE SERPL-MCNC: 97 MG/DL (ref 74–99)
HCT VFR BLD AUTO: 28.1 % (ref 35–45)
HGB BLD-MCNC: 8.9 G/DL (ref 12–16)
IMM GRANULOCYTES # BLD AUTO: 0.1 K/UL (ref 0–0.04)
IMM GRANULOCYTES NFR BLD AUTO: 1 % (ref 0–0.5)
LYMPHOCYTES # BLD: 0.7 K/UL (ref 0.9–3.6)
LYMPHOCYTES NFR BLD: 8 % (ref 21–52)
MAGNESIUM SERPL-MCNC: 2.1 MG/DL (ref 1.6–2.6)
MCH RBC QN AUTO: 29.2 PG (ref 24–34)
MCHC RBC AUTO-ENTMCNC: 31.7 G/DL (ref 31–37)
MCV RBC AUTO: 92.1 FL (ref 78–100)
MONOCYTES # BLD: 0.8 K/UL (ref 0.05–1.2)
MONOCYTES NFR BLD: 8 % (ref 3–10)
NEUTS SEG # BLD: 7.9 K/UL (ref 1.8–8)
NEUTS SEG NFR BLD: 84 % (ref 40–73)
NRBC # BLD: 0 K/UL (ref 0–0.01)
NRBC BLD-RTO: 0 PER 100 WBC
PLATELET # BLD AUTO: 228 K/UL (ref 135–420)
PMV BLD AUTO: 10.2 FL (ref 9.2–11.8)
POTASSIUM SERPL-SCNC: 4.2 MMOL/L (ref 3.5–5.5)
PROT SERPL-MCNC: 5.4 G/DL (ref 6.4–8.2)
RBC # BLD AUTO: 3.05 M/UL (ref 4.2–5.3)
SODIUM SERPL-SCNC: 136 MMOL/L (ref 136–145)
WBC # BLD AUTO: 9.5 K/UL (ref 4.6–13.2)

## 2022-08-28 PROCEDURE — 74011250637 HC RX REV CODE- 250/637: Performed by: FAMILY MEDICINE

## 2022-08-28 PROCEDURE — 51798 US URINE CAPACITY MEASURE: CPT

## 2022-08-28 PROCEDURE — 74011250636 HC RX REV CODE- 250/636: Performed by: PHYSICIAN ASSISTANT

## 2022-08-28 PROCEDURE — 65270000029 HC RM PRIVATE

## 2022-08-28 PROCEDURE — 36415 COLL VENOUS BLD VENIPUNCTURE: CPT

## 2022-08-28 PROCEDURE — 74011250637 HC RX REV CODE- 250/637: Performed by: PHYSICIAN ASSISTANT

## 2022-08-28 PROCEDURE — 85025 COMPLETE CBC W/AUTO DIFF WBC: CPT

## 2022-08-28 PROCEDURE — 80053 COMPREHEN METABOLIC PANEL: CPT

## 2022-08-28 PROCEDURE — C9113 INJ PANTOPRAZOLE SODIUM, VIA: HCPCS | Performed by: INTERNAL MEDICINE

## 2022-08-28 PROCEDURE — 83735 ASSAY OF MAGNESIUM: CPT

## 2022-08-28 PROCEDURE — 74011250636 HC RX REV CODE- 250/636: Performed by: INTERNAL MEDICINE

## 2022-08-28 PROCEDURE — 74011250637 HC RX REV CODE- 250/637: Performed by: INTERNAL MEDICINE

## 2022-08-28 PROCEDURE — 74011000250 HC RX REV CODE- 250: Performed by: PHYSICIAN ASSISTANT

## 2022-08-28 PROCEDURE — 74011000250 HC RX REV CODE- 250: Performed by: INTERNAL MEDICINE

## 2022-08-28 RX ORDER — ASPIRIN 81 MG/1
81 TABLET ORAL 2 TIMES DAILY
Qty: 60 TABLET | Refills: 0 | Status: SHIPPED | OUTPATIENT
Start: 2022-08-28 | End: 2022-09-27

## 2022-08-28 RX ORDER — CALCIUM CARBONATE 200(500)MG
200 TABLET,CHEWABLE ORAL
Status: DISCONTINUED | OUTPATIENT
Start: 2022-08-28 | End: 2022-08-29 | Stop reason: HOSPADM

## 2022-08-28 RX ADMIN — CLONAZEPAM 1 MG: 0.5 TABLET ORAL at 08:32

## 2022-08-28 RX ADMIN — CLONAZEPAM 1 MG: 0.5 TABLET ORAL at 21:21

## 2022-08-28 RX ADMIN — ACETAMINOPHEN 650 MG: 325 TABLET ORAL at 18:05

## 2022-08-28 RX ADMIN — FERROUS SULFATE TAB 325 MG (65 MG ELEMENTAL FE) 325 MG: 325 (65 FE) TAB at 08:32

## 2022-08-28 RX ADMIN — SODIUM CHLORIDE, PRESERVATIVE FREE 10 ML: 5 INJECTION INTRAVENOUS at 21:22

## 2022-08-28 RX ADMIN — ESCITALOPRAM OXALATE 20 MG: 10 TABLET ORAL at 08:32

## 2022-08-28 RX ADMIN — ANTACID TABLETS 200 MG: 500 TABLET, CHEWABLE ORAL at 08:36

## 2022-08-28 RX ADMIN — ANTACID TABLETS 200 MG: 500 TABLET, CHEWABLE ORAL at 18:06

## 2022-08-28 RX ADMIN — FERROUS SULFATE TAB 325 MG (65 MG ELEMENTAL FE) 325 MG: 325 (65 FE) TAB at 21:22

## 2022-08-28 RX ADMIN — FERROUS SULFATE TAB 325 MG (65 MG ELEMENTAL FE) 325 MG: 325 (65 FE) TAB at 17:20

## 2022-08-28 RX ADMIN — ONDANSETRON 4 MG: 4 TABLET, ORALLY DISINTEGRATING ORAL at 22:39

## 2022-08-28 RX ADMIN — ASPIRIN 81 MG: 81 TABLET, COATED ORAL at 21:22

## 2022-08-28 RX ADMIN — SODIUM CHLORIDE 40 MG: 9 INJECTION, SOLUTION INTRAMUSCULAR; INTRAVENOUS; SUBCUTANEOUS at 21:22

## 2022-08-28 RX ADMIN — ACETAMINOPHEN 650 MG: 325 TABLET ORAL at 06:17

## 2022-08-28 RX ADMIN — KETOROLAC TROMETHAMINE 15 MG: 15 INJECTION, SOLUTION INTRAMUSCULAR; INTRAVENOUS at 06:18

## 2022-08-28 RX ADMIN — ASPIRIN 81 MG: 81 TABLET, COATED ORAL at 08:32

## 2022-08-28 RX ADMIN — SODIUM CHLORIDE 40 MG: 9 INJECTION, SOLUTION INTRAMUSCULAR; INTRAVENOUS; SUBCUTANEOUS at 08:32

## 2022-08-28 RX ADMIN — FLUVOXAMINE MALEATE 50 MG: 50 TABLET, COATED ORAL at 21:21

## 2022-08-28 RX ADMIN — ANTACID TABLETS 200 MG: 500 TABLET, CHEWABLE ORAL at 11:06

## 2022-08-28 RX ADMIN — SODIUM CHLORIDE, PRESERVATIVE FREE 10 ML: 5 INJECTION INTRAVENOUS at 14:00

## 2022-08-28 RX ADMIN — AMITRIPTYLINE HYDROCHLORIDE 150 MG: 50 TABLET, FILM COATED ORAL at 21:21

## 2022-08-28 RX ADMIN — ARIPIPRAZOLE 10 MG: 10 TABLET ORAL at 21:22

## 2022-08-28 NOTE — PROGRESS NOTES
D/c plan: home w/ HH vs SNF pending medical progression. CM met w/ pt and spouse at bedside. CM confirmed information on pt's face sheet. Pt resides at home w/ spouse. Pt has a cane, elevated RW and rollator at home. Pt uses the RW when she goes to the mall. She doesn't use her rollator often. Discussed home w/ HH. FOC provided. Pt would like to use the [facility] agency that she previously used. However, they are unable to recall the name of [facility] agency. Advised them that the CMS will review the chart 08/29/22 to obtain the name and make the referral. They verbalized an understanding. Informed the pt and her spouse that there isn't a PT in house today and the PTA cannot do an initial evaluation therefore, her eval will be completed on 08/29. Advised them both that CM will review their recommendations to ensure that Home w/ HH is the safest d/c plan. They verbalized an understanding. CM asked PTA to have PT eval as early as possible 08/29. Care Management Interventions  PCP Verified by CM: Yes (Dr. Domonique Negrete )  Palliative Care Criteria Met (RRAT>21 & CHF Dx)?: No  Mode of Transport at Discharge: Other (see comment)  Transition of Care Consult (CM Consult): Home Health, SNF (Anticipate d/c home w/ HH vs SNF )  Discharge Durable Medical Equipment: No (Pt has a RW, rollator and a cane.)  Physical Therapy Consult: Yes (s/p surgery. )  Occupational Therapy Consult: Yes (s/p surgery.)  Speech Therapy Consult: No  Support Systems: Spouse/Significant Other  Confirm Follow Up Transport: Family  The Plan for Transition of Care is Related to the Following Treatment Goals : Anticipate home w/ [facility] vs SNF pending medical progression.   The Patient and/or Patient Representative was Provided with a Choice of Provider and Agrees with the Discharge Plan?: Yes (The pt is alert and oriented. )  Freedom of Choice List was Provided with Basic Dialogue that Supports the Patient's Individualized Plan of Care/Goals, Treatment Preferences and Shares the Quality Data Associated with the Providers?: Yes (For HH. Pt and spouse would like to use who they had previously. They can't remember the name of the [facility].  CMS to research. )  Discharge Location  Patient Expects to be Discharged to[de-identified] Skilled nursing facility    Readmission Assessment  Number of days since last admission?: 8-30 days  Previous disposition: Home with Home Health  Who is being interviewed?: Patient, Caregiver  What was the patient's/caregiver's perception as to why they think they needed to return back to the hospital?: Other (Comment) (disclocated hip)  Did you visit your Primary Care Physician after you left the hospital, before you returned this time?: Yes  Who advised the patient to return to the hospital?: Self-referral  Does the patient report anything that got in the way of taking their medications?: No

## 2022-08-28 NOTE — PROGRESS NOTES
2115-patient resting with eyes closed, breathing even and unlabored. Patient has asked not to be disturbed so will not assess at this time. 2205-Patient resting with eyes closed, breathing even and unlabored. Patient has asked not to be disturbed so will again wait to assess until all medications can be given. 2250-patient sleeping but easily aroused. Lungs CTA on RA. BS active x 4 quads. Honey comb dressing to left hip, CDI. IV access to left ac infusing IVF without difficulty. SCDs in place. Genao catheter in place, draining clear yellow urine. Pain rated at 0/10. Patient said she wants to sleep and not be awakened. Informed her that her care will be clustered as much as possible. 0632-genao catheter removed. Patient does not want Linzess at this time. She says she takes it 30 minutes after breakfast, rescheduled for 9 am.    Shift summary-genao catheter in place through the noc voiding sufficiently. Patient has been resting quietly. She did refuse 3 am vital signs and wants to rest. Staff has been able to look in at her through the noc without disturbing her. No requests for pain medication.

## 2022-08-28 NOTE — OP NOTES
[facility]  OPERATIVE REPORT    Name:  Sara Wilson  MR#:   [id number]  :  1950  ACCOUNT #:  [de-identified]  DATE OF SERVICE:  2022    PREOPERATIVE DIAGNOSIS:  Recurrent instability, left total hip arthroplasty. POSTOPERATIVE DIAGNOSIS:  Recurrent instability, left total hip arthroplasty. PROCEDURE PERFORMED:  Revision total hip arthroplasty, left. SURGEON:  Dedra Carrion. MD Kaleigh    ASSISTANT:  MAIA Lima    ANESTHESIA:  General.    COMPLICATIONS:  None    SPECIMENS REMOVED:  None. IMPLANTS:  Component, Amador. ESTIMATED BLOOD LOSS:  300 mL    INDICATIONS:  The patient is a 44-year-old female with multiple dislocations in the posterior direction with total hip arthroplasty. She underwent a revision two days after her primary surgery and it continued to dislocate. She presented to the emergency room last night. They reduced it, but then it re-dislocated. It was elected to proceed with surgery in the most safe environment for her. Indications, risks, benefits, complications, alternative forms of therapy, and expected outcomes were explained. She seemed to understand and agreed to proceed. PROCEDURE:  The patient was brought to the operating room. After successful induction of anesthesia, the patient's left lower extremity was prepped and draped with ChloraPrep solution. We reentered the hip through the same incisions. We were careful to maintain our dissection as close to the acetabulum as we could. The components could not be reduced preoperatively. We made our approach to the cup. Retractors were placed superiorly right on the edge of the cup. The rim was gently debrided. Hemostasis was maintained. The titanium liner was removed. The head was removed. One additional screw was placed into the cup. The Trident constrained acetabulum insert size F was impacted into position and its stability was confirmed.   The stem was fitted with a +4, 28 mm femoral head.  The components were reduced and found to be stable in flexion and extension, internal and external rotation   The Shuck test was negative. The wound was copiously irrigated and drained. Hemostasis was maintained. The capsular layer was repaired using interrupted sutures of #1 Ethibond subcutaneous tissue using interrupted sutures of 2-0 Vicryl. The skin was repaired with Monocryl and Dermabond. Sterile dressings were applied. No complication were encountered. She tolerated the procedure well. She was wheeled to Recovery in stable condition.       Guillermo Stokes MD [id number]  D:  08/27/2022 10:21  T:  08/27/2022 19:55  JOB #:  [id number]

## 2022-08-28 NOTE — PROGRESS NOTES
Problem: Falls - Risk of  Goal: *Absence of Falls  Description: Document Charlie Sol Fall Risk and appropriate interventions in the flowsheet. Outcome: Progressing Towards Goal  Note: Fall Risk Interventions:  Mobility Interventions: Bed/chair exit alarm, PT Consult for mobility concerns, Patient to call before getting OOB    Mentation Interventions: Adequate sleep, hydration, pain control    Medication Interventions: Bed/chair exit alarm, Patient to call before getting OOB, Teach patient to arise slowly    Elimination Interventions: Bed/chair exit alarm, Call light in reach    History of Falls Interventions: Bed/chair exit alarm, Investigate reason for fall, Room close to nurse's station         Problem: Patient Education: Go to Patient Education Activity  Goal: Patient/Family Education  Outcome: Progressing Towards Goal     Problem: General Medical Care Plan  Goal: *Vital signs within specified parameters  Outcome: Progressing Towards Goal  Goal: *Labs within defined limits  Outcome: Progressing Towards Goal  Goal: *Absence of infection signs and symptoms  Outcome: Progressing Towards Goal  Goal: *Optimal pain control at patient's stated goal  Outcome: Progressing Towards Goal  Goal: *Skin integrity maintained  Outcome: Progressing Towards Goal  Goal: *Fluid volume balance  Outcome: Progressing Towards Goal  Goal: *Optimize nutritional status  Outcome: Progressing Towards Goal  Goal: *Anxiety reduced or absent  Outcome: Progressing Towards Goal  Goal: *Progressive mobility and function (eg: ADL's)  Outcome: Progressing Towards Goal     Problem: Patient Education: Go to Patient Education Activity  Goal: Patient/Family Education  Outcome: Progressing Towards Goal     Problem: Pressure Injury - Risk of  Goal: *Prevention of pressure injury  Description: Document North Scale and appropriate interventions in the flowsheet.   Outcome: Progressing Towards Goal  Note: Pressure Injury Interventions:       Moisture Interventions: Absorbent underpads, Minimize layers    Activity Interventions: Pressure redistribution bed/mattress(bed type), PT/OT evaluation    Mobility Interventions: Pressure redistribution bed/mattress (bed type), PT/OT evaluation    Nutrition Interventions: Document food/fluid/supplement intake    Friction and Shear Interventions: Apply protective barrier, creams and emollients, Minimize layers                Problem: Patient Education: Go to Patient Education Activity  Goal: Patient/Family Education  Outcome: Progressing Towards Goal

## 2022-08-28 NOTE — PROGRESS NOTES
Straight cath patient x 1, output 425cc. Will pass on to next shift to monitor output/bladder scan as needed. Will continue to monitor.

## 2022-08-28 NOTE — DISCHARGE SUMMARY
Discharge Summary     Patient: Isabella Sloan               Sex: female          Admit Date: 8/26/2022       YOB: 1950      Age:  67 y.o. Discharge Date: 8/28/2022              Admission Diagnoses: Hip dislocation, left [facility]) [S73.005A]    Discharge Diagnoses:  Recurrent posterior dislocation, left LAURA  Problem List as of 8/28/2022 Date Reviewed: 8/27/2022            Codes Class Noted - Resolved    Primary hypertension ICD-10-CM: I10  ICD-9-CM: 401.9  8/26/2022 - Present        Depression ICD-10-CM: F32. A  ICD-9-CM: 411  8/26/2022 - Present        GERD (gastroesophageal reflux disease) ICD-10-CM: K21.9  ICD-9-CM: 530.81  8/26/2022 - Present        * (Principal) Hip dislocation, left, initial encounter [facility]) ICD-10-CM: S73.005A  ICD-9-CM: 835.00  7/11/2022 - Present        Status post revision of total hip replacement ICD-10-CM: Z96.649  ICD-9-CM: V43.64  5/25/2022 - Present        Status post left hip replacement ICD-10-CM: F27.339  ICD-9-CM: V43.64  5/23/2022 - Present        S/P total knee arthroplasty ICD-10-CM: Z96.659  ICD-9-CM: V43.65  2/4/2013 - Present        RESOLVED: Osteoarthritis of right knee ICD-10-CM: M17.11  ICD-9-CM: 715.96  2/1/2013 - 2/4/2013           Discharge Condition: Good    Patient Active Problem List   Diagnosis Code    S/P total knee arthroplasty Z96.659    Status post left hip replacement Z96.642    Status post revision of total hip replacement Z96.649    Hip dislocation, left, initial encounter ([facility] S73.005A    Primary hypertension I10    Depression F32. A    GERD (gastroesophageal reflux disease) K21.9       Discharge Diagnosis:   Problem List as of 8/28/2022 Date Reviewed: 8/27/2022            Codes Class Noted - Resolved    Primary hypertension ICD-10-CM: I10  ICD-9-CM: 401.9  8/26/2022 - Present        Depression ICD-10-CM: F32. A  ICD-9-CM: 800  8/26/2022 - Present        GERD (gastroesophageal reflux disease) ICD-10-CM: K21.9  ICD-9-CM: 530.81  8/26/2022 - Present        * (Principal) Hip dislocation, left, initial encounter [facility]) ICD-10-CM: S73.005A  ICD-9-CM: 835.00  7/11/2022 - Present        Status post revision of total hip replacement ICD-10-CM: K34.535  ICD-9-CM: V43.64  5/25/2022 - Present        Status post left hip replacement ICD-10-CM: E94.960  ICD-9-CM: V43.64  5/23/2022 - Present        S/P total knee arthroplasty ICD-10-CM: Z96.659  ICD-9-CM: V43.65  2/4/2013 - Present        RESOLVED: Osteoarthritis of right knee ICD-10-CM: M17.11  ICD-9-CM: 715.96  2/1/2013 - 2/4/2013            Current Discharge Medication List        CONTINUE these medications which have NOT CHANGED    Details   aspirin delayed-release 81 mg tablet Take 1 Tablet by mouth two (2) times a day. Qty: 60 Tablet, Refills: 0      fluvoxaMINE (LUVOX) 50 mg tablet Take 50 mg by mouth nightly. Indications: depression      linaCLOtide (LINZESS) 145 mcg cap capsule Take 145 mg by mouth. Indications: IBS      clonazePAM (KlonoPIN) 1 mg tablet Take 1 mg by mouth two (2) times a day. triamterene-hydroCHLOROthiazide (DYAZIDE) 37.5-25 mg per capsule Take 1 Capsule by mouth daily. escitalopram (LEXAPRO) 20 mg tablet Take 20 mg by mouth daily. In am  Indications: anxiousness associated with depression      ARIPiprazole (ABILIFY) 2 mg tablet Take 10 mg by mouth nightly. Indications: depression      amitriptyline (ELAVIL) 100 mg tablet Take 200 mg by mouth nightly. Indications: anxious, helps with sleep      flavoxate HCl (FLAVOXATE PO) Take  by mouth. Allergies   Allergen Reactions    Neosporin [Benzalkonium Chloride] Other (comments)     Soreness and redness    Other Medication Shortness of Breath and Swelling     Yellow Jacket bee stings        Operative Procedures:  Procedure(s):  LEFT TOTAL HIP REVISION WITH C-ARM AND SOPHIA    HPI:   Workup included radiographs which revealed she was an appropriate candidate for surgical intervention.  Risks, benefits, alternatives and outcomes of surgical management were discussed with the patient who stated that she understood and wished to proceed. Appropriate informed consent was obtained and arrangements were made for elective surgery. No medical issues needed management    Hospital Course: The postoperative course was uneventful. Patient did well with pain control and progressed with physical therapy. She tolerated a diet. Physical Exam on Discharge:  Visit Vitals  BP (!) 136/57 (BP 1 Location: Right upper arm, BP Patient Position: At rest;Semi fowlers)   Pulse 91   Temp 98.1 °F (36.7 °C)   Resp 16   Ht 5' 10\" (1.778 m)   Wt 96.1 kg (211 lb 14.4 oz)   SpO2 97%   Breastfeeding No   BMI 30.40 kg/m²     Incision shows no drainage, erythema or warmth. mild swelling noted. No LLE tenderness. Moves LLE  well. NVS intact.     Labs: Results:       Chemistry Recent Labs     08/28/22 0215   GLU 97      K 4.2      CO2 25   BUN 18   CREA 1.29   CA 8.2*   AGAP 8      CBC w/Diff Recent Labs     08/28/22 0215   WBC 9.5   RBC 3.05*   HGB 8.9*   HCT 28.1*         Coagulation Recent Labs     08/26/22  1610   PTP 13.0   INR 1.0             Condition at discharge: Stable    Disposition: Condition stable  DC-The patient was given verbal wound care  instructions      PCP:  MD Timo Gomez MD

## 2022-08-28 NOTE — ROUTINE PROCESS
Bedside and Verbal shift change report given to EULALIA Robertson RN (oncoming nurse) by Halima Green. Alison Florence RN (offgoing nurse). Report included the following information SBAR, Kardex, OR Summary, Intake/Output, and MAR.

## 2022-08-28 NOTE — DISCHARGE SUMMARY
Discharge Summary    Patient: Kiki Arnold MRN: [id number]  CSN: [id number]    YOB: 1950  Age: 67 y.o. Sex: female    DOA: 8/26/2022 LOS:  LOS: 2 days   Discharge Date:      Primary Care Provider:  Suhail Naik MD    Admission Diagnoses: Hip dislocation, left [facility]) [S27.394T]    Discharge Diagnoses:    Problem List as of 8/28/2022 Date Reviewed: 8/27/2022            Codes Class Noted - Resolved    Primary hypertension ICD-10-CM: I10  ICD-9-CM: 401.9  8/26/2022 - Present        Depression ICD-10-CM: F32. A  ICD-9-CM: 633  8/26/2022 - Present        GERD (gastroesophageal reflux disease) ICD-10-CM: K21.9  ICD-9-CM: 530.81  8/26/2022 - Present        * (Principal) Hip dislocation, left, initial encounter [facility]) ICD-10-CM: S73.005A  ICD-9-CM: 835.00  7/11/2022 - Present        Status post revision of total hip replacement ICD-10-CM: Z96.649  ICD-9-CM: V43.64  5/25/2022 - Present        Status post left hip replacement ICD-10-CM: P72.820  ICD-9-CM: V43.64  5/23/2022 - Present        S/P total knee arthroplasty ICD-10-CM: Z96.659  ICD-9-CM: V43.65  2/4/2013 - Present        RESOLVED: Osteoarthritis of right knee ICD-10-CM: M17.11  ICD-9-CM: 715.96  2/1/2013 - 2/4/2013           Discharge Medications:     Current Discharge Medication List        START taking these medications    Details   !! aspirin delayed-release 81 mg tablet Take 1 Tablet by mouth two (2) times a day for 30 days. Qty: 60 Tablet, Refills: 0  Start date: 8/28/2022, End date: 9/27/2022       !! - Potential duplicate medications found. Please discuss with provider. CONTINUE these medications which have NOT CHANGED    Details   !! aspirin delayed-release 81 mg tablet Take 1 Tablet by mouth two (2) times a day. Qty: 60 Tablet, Refills: 0      fluvoxaMINE (LUVOX) 50 mg tablet Take 50 mg by mouth nightly. Indications: depression      linaCLOtide (LINZESS) 145 mcg cap capsule Take 145 mg by mouth.  Indications: IBS clonazePAM (KlonoPIN) 1 mg tablet Take 1 mg by mouth two (2) times a day. triamterene-hydroCHLOROthiazide (DYAZIDE) 37.5-25 mg per capsule Take 1 Capsule by mouth daily. escitalopram (LEXAPRO) 20 mg tablet Take 20 mg by mouth daily. In am  Indications: anxiousness associated with depression      ARIPiprazole (ABILIFY) 2 mg tablet Take 10 mg by mouth nightly. Indications: depression      amitriptyline (ELAVIL) 100 mg tablet Take 200 mg by mouth nightly. Indications: anxious, helps with sleep      flavoxate HCl (FLAVOXATE PO) Take  by mouth. !! - Potential duplicate medications found. Please discuss with provider. Discharge Condition: Stable    Procedures : LEFT TOTAL HIP REVISION WITH C-ARM AND SOPHIA on 08/27/22    Consults: Orthopedics      PHYSICAL EXAM   Visit Vitals  BP (!) 138/54 (BP 1 Location: Right upper arm, BP Patient Position: At rest;Semi fowlers)   Pulse 90   Temp 97.7 °F (36.5 °C)   Resp 16   Ht 5' 10\" (1.778 m)   Wt 96.1 kg (211 lb 14.4 oz)   SpO2 97%   Breastfeeding No   BMI 30.40 kg/m²     General: Awake, cooperative, no acute distress    HEENT: NC, Atraumatic. PERRLA, EOMI. Anicteric sclerae. Lungs:  CTA Bilaterally. No Wheezing/Rhonchi/Rales. Heart:  Regular  rhythm,  No murmur, No Rubs, No Gallops  Abdomen: Soft, Non distended, Non tender. +Bowel sounds,   Extremities: No c/c/e  Psych:   Not anxious or agitated. Neurologic:  No acute neurological deficits. Admission HPI :   Gautam Kunz is a 67 y.o.  female who has history of hypertension, GERD and depression as well as several hip dislocations since her hip replacement. Patient sustained a fall after turning abruptly and early transitioning from one room to the next she fell and was unable to get up. She called EMS and arrived to the emergency room an attempt was made to fix her dislocated hip in the emergency room it lasted less than 5 minutes and popped out again. Patient is disappointed as to since having over 7 times. She denies any chest pain shortness of breath or palpitations prior to her fall. She denies any COVID-19 exposures and she is vaccinated. I am asked to admit for surgery in the morning. Hospital Course :     Recurrent Left hip dislocation: Consult to ortho upon her admission. Pt underwent LEFT TOTAL HIP REVISION WITH C-ARM AND SOPHIA on 08/27 with Dr. Hasmukh Montes. Doing well after the surgery. Pain controlled   DVT prevention postsurgery per surgery  PT done and clear her to be discharged home with home health. Esophageal reflux with history of strictures: Protonix IV every 12  Aspiration Precautions. She may continue her home regimen. Difficult intubation in the past: Due to strictures last dilation was March 2022. Doing well this time with her anesthesia      Hypertension: Well-controlled. Resume home meds     Hypokalemia: Normal after oral potassium repletion. Depression: Contracts for safety.      Continue home medications    PT/OT     Hyperglycemia : Monitor BP    Activity: Activity as tolerated    Diet: Cardiac Diet    Follow-up: with PCP and ortho in 1-2 wks    Disposition: Home with home health    Minutes spent on discharge: 37 min       Labs: Results:       Chemistry Recent Labs     08/28/22  0215 08/27/22  1222 08/27/22  0612 08/26/22  1610   GLU 97 168* 105* 112*    136 137 136   K 4.2 3.7 3.9 3.6    103 101 102   CO2 25 26 31 28   BUN 18 14 15 19*   CREA 1.29 1.15 1.16 1.34*   CA 8.2* 8.6 9.1 8.6   AGAP 8 7 5 6   BUCR 14 12 13 14   *  --  194* 184*   TP 5.4*  --  6.5 6.8   ALB 2.7*  --  3.4 3.5   GLOB 2.7  --  3.1 3.3   AGRAT 1.0  --  1.1 1.1      CBC w/Diff Recent Labs     08/28/22  0215 08/27/22  0612 08/26/22  1610   WBC 9.5 8.9 5.4   RBC 3.05* 3.90* 3.60*   HGB 8.9* 11.0* 10.4*   HCT 28.1* 35.2 32.2*    241 231   GRANS 84* 79* 64   LYMPH 8* 13* 28   EOS 0 1 1      Cardiac Enzymes No results for input(s): CPK, CKND1, GREGORIO in the last 72 hours. No lab exists for component: CKRMB, TROIP   Coagulation Recent Labs     08/26/22  1610   PTP 13.0   INR 1.0       Lipid Panel No results found for: CHOL, CHOLPOCT, CHOLX, CHLST, CHOLV, 538981, HDL, HDLP, LDL, LDLC, DLDLP, 960336, VLDLC, VLDL, TGLX, TRIGL, TRIGP, TGLPOCT, CHHD, CHHDX   BNP No results for input(s): BNPP in the last 72 hours. Liver Enzymes Recent Labs     08/28/22  0215   TP 5.4*   ALB 2.7*   *      Thyroid Studies No results found for: T4, T3U, TSH, TSHEXT         Significant Diagnostic Studies: XR HIP LT W OR WO PELV 2-3 VWS    Result Date: 8/26/2022  EXAM: XR HIP LT W OR WO PELV 2-3 VWS CLINICAL INDICATION/HISTORY: dislocation   > Additional: None. COMPARISON: 7/26/2022 TECHNIQUE: 2 views of left hip. _______________ FINDINGS: BONES: Dislocated left total hip arthroplasty. No or evidence of acute fracture. No aggressive appearing osseous lytic or blastic lesion. SOFT TISSUES: Unremarkable. _______________     Dislocated left total hip arthroplasty. XR CHEST PORT    Result Date: 8/26/2022  EXAM: XR CHEST PORT CLINICAL INDICATION/HISTORY: Left hip pain. -Additional: None COMPARISON: None TECHNIQUE: Frontal view of the chest _______________ FINDINGS: HEART AND MEDIASTINUM: Normal cardiac size and mediastinal contours. LUNGS AND PLEURAL SPACES: No focal pneumonic consolidation, pneumothorax, or pleural effusion. BONY THORAX AND SOFT TISSUES: No acute osseous abnormality _______________     No acute findings in the chest.     XR HIP LT W OR WO PELV  1 VW    Result Date: 8/27/2022  EXAM:  Left hip RADIOGRAPH CLINICAL INDICATION/HISTORY: post op   > Additional: None COMPARISON: 8/26/2022. TECHNIQUE: 1 view of the left hip _______________ FINDINGS: BONES: Interval reduction of left hip arthroplasty dislocation. Alignment appears anatomic on single AP view. SOFT TISSUES: Left hip soft tissue emphysema. _______________     1.  Post reduction with good alignment of left hip arthroplasty. XR HIP LT W OR WO PELV  1 VW    Result Date: 8/27/2022  EXAM:  Left hip RADIOGRAPH CLINICAL INDICATION/HISTORY: post reduction   > Additional: None COMPARISON: 8/26/2022. TECHNIQUE: 1 view of the left hip _______________ FINDINGS: BONES: Left hip replacement. No evidence for fracture. Stable alignment of dislocated left hip. SOFT TISSUES: Unremarkable. _______________     Stable alignment of dislocated left hip arthroplasty. No results found for this or any previous visit.            CC: Fuad Sibley MD

## 2022-08-28 NOTE — PROGRESS NOTES
Miranda catheter out at 6am, patient has not voided as of yet,patient placed on bedpan with no results. Her bladder scan revealed 230cc. Marilu yi, spoke to Catina who was made aware that patient has not urinated yet but not much in bladder. Patient is eating well and taking in fluids,minimal pain to left hip. She has not gotten up yet due to waiting on initial Physical Therapy assessment which is not available until tomorrow. Will continue to monitor,encourage elimination and bladder scan, will straight cath patient x 1 if scan shows over 500cc per MAIA Posey .

## 2022-08-28 NOTE — ROUTINE PROCESS
Bedside and Verbal shift change report given to TRELL Reyes RN (oncoming nurse) by Terrence Rx (offgoing nurse). Report included the following information SBAR, Kardex, OR Summary, Procedure Summary, Intake/Output, and MAR.

## 2022-08-29 ENCOUNTER — APPOINTMENT (OUTPATIENT)
Dept: GENERAL RADIOLOGY | Age: 72
DRG: 468 | End: 2022-08-29
Attending: PHYSICIAN ASSISTANT
Payer: MEDICARE

## 2022-08-29 VITALS
OXYGEN SATURATION: 96 % | RESPIRATION RATE: 16 BRPM | HEIGHT: 70 IN | DIASTOLIC BLOOD PRESSURE: 74 MMHG | HEART RATE: 90 BPM | WEIGHT: 225.3 LBS | BODY MASS INDEX: 32.25 KG/M2 | SYSTOLIC BLOOD PRESSURE: 136 MMHG | TEMPERATURE: 98.1 F

## 2022-08-29 LAB
ALBUMIN SERPL-MCNC: 2.6 G/DL (ref 3.4–5)
ALBUMIN/GLOB SERPL: 1 {RATIO} (ref 0.8–1.7)
ALP SERPL-CCNC: 147 U/L (ref 45–117)
ALT SERPL-CCNC: <6 U/L (ref 13–56)
ANION GAP SERPL CALC-SCNC: 4 MMOL/L (ref 3–18)
AST SERPL-CCNC: 14 U/L (ref 10–38)
BASOPHILS # BLD: 0 K/UL (ref 0–0.1)
BASOPHILS NFR BLD: 0 % (ref 0–2)
BILIRUB SERPL-MCNC: 0.2 MG/DL (ref 0.2–1)
BUN SERPL-MCNC: 19 MG/DL (ref 7–18)
BUN/CREAT SERPL: 18 (ref 12–20)
CALCIUM SERPL-MCNC: 8.4 MG/DL (ref 8.5–10.1)
CHLORIDE SERPL-SCNC: 103 MMOL/L (ref 100–111)
CO2 SERPL-SCNC: 26 MMOL/L (ref 21–32)
CREAT SERPL-MCNC: 1.07 MG/DL (ref 0.6–1.3)
DIFFERENTIAL METHOD BLD: ABNORMAL
EOSINOPHIL # BLD: 0.1 K/UL (ref 0–0.4)
EOSINOPHIL NFR BLD: 1 % (ref 0–5)
ERYTHROCYTE [DISTWIDTH] IN BLOOD BY AUTOMATED COUNT: 13.1 % (ref 11.6–14.5)
GLOBULIN SER CALC-MCNC: 2.6 G/DL (ref 2–4)
GLUCOSE SERPL-MCNC: 87 MG/DL (ref 74–99)
HCT VFR BLD AUTO: 25.8 % (ref 35–45)
HGB BLD-MCNC: 8.3 G/DL (ref 12–16)
IMM GRANULOCYTES # BLD AUTO: 0 K/UL (ref 0–0.04)
IMM GRANULOCYTES NFR BLD AUTO: 0 % (ref 0–0.5)
LYMPHOCYTES # BLD: 1.2 K/UL (ref 0.9–3.6)
LYMPHOCYTES NFR BLD: 15 % (ref 21–52)
MAGNESIUM SERPL-MCNC: 2.2 MG/DL (ref 1.6–2.6)
MCH RBC QN AUTO: 28.5 PG (ref 24–34)
MCHC RBC AUTO-ENTMCNC: 32.2 G/DL (ref 31–37)
MCV RBC AUTO: 88.7 FL (ref 78–100)
MONOCYTES # BLD: 0.5 K/UL (ref 0.05–1.2)
MONOCYTES NFR BLD: 7 % (ref 3–10)
NEUTS SEG # BLD: 6.2 K/UL (ref 1.8–8)
NEUTS SEG NFR BLD: 77 % (ref 40–73)
NRBC # BLD: 0 K/UL (ref 0–0.01)
NRBC BLD-RTO: 0 PER 100 WBC
PLATELET # BLD AUTO: 210 K/UL (ref 135–420)
PMV BLD AUTO: 9.6 FL (ref 9.2–11.8)
POTASSIUM SERPL-SCNC: 4.1 MMOL/L (ref 3.5–5.5)
PROT SERPL-MCNC: 5.2 G/DL (ref 6.4–8.2)
RBC # BLD AUTO: 2.91 M/UL (ref 4.2–5.3)
SODIUM SERPL-SCNC: 133 MMOL/L (ref 136–145)
WBC # BLD AUTO: 8.1 K/UL (ref 4.6–13.2)

## 2022-08-29 PROCEDURE — 85025 COMPLETE CBC W/AUTO DIFF WBC: CPT

## 2022-08-29 PROCEDURE — 83735 ASSAY OF MAGNESIUM: CPT

## 2022-08-29 PROCEDURE — 74011250637 HC RX REV CODE- 250/637: Performed by: INTERNAL MEDICINE

## 2022-08-29 PROCEDURE — 77030040830 HC CATH URETH FOL MDII -A

## 2022-08-29 PROCEDURE — 74011250637 HC RX REV CODE- 250/637: Performed by: PHYSICIAN ASSISTANT

## 2022-08-29 PROCEDURE — 97530 THERAPEUTIC ACTIVITIES: CPT

## 2022-08-29 PROCEDURE — 73502 X-RAY EXAM HIP UNI 2-3 VIEWS: CPT

## 2022-08-29 PROCEDURE — 36415 COLL VENOUS BLD VENIPUNCTURE: CPT

## 2022-08-29 PROCEDURE — 97116 GAIT TRAINING THERAPY: CPT

## 2022-08-29 PROCEDURE — 80053 COMPREHEN METABOLIC PANEL: CPT

## 2022-08-29 PROCEDURE — 74011000250 HC RX REV CODE- 250: Performed by: PHYSICIAN ASSISTANT

## 2022-08-29 PROCEDURE — 97162 PT EVAL MOD COMPLEX 30 MIN: CPT

## 2022-08-29 PROCEDURE — 51798 US URINE CAPACITY MEASURE: CPT

## 2022-08-29 PROCEDURE — 74011250637 HC RX REV CODE- 250/637: Performed by: FAMILY MEDICINE

## 2022-08-29 RX ORDER — OXYCODONE HYDROCHLORIDE 5 MG/1
5 TABLET ORAL
Qty: 42 TABLET | Refills: 0 | Status: SHIPPED | OUTPATIENT
Start: 2022-08-29 | End: 2022-09-05

## 2022-08-29 RX ORDER — PANTOPRAZOLE SODIUM 40 MG/1
40 TABLET, DELAYED RELEASE ORAL
Status: DISCONTINUED | OUTPATIENT
Start: 2022-08-29 | End: 2022-08-29 | Stop reason: HOSPADM

## 2022-08-29 RX ORDER — NALOXONE HYDROCHLORIDE 4 MG/.1ML
SPRAY NASAL
Qty: 1 EACH | Refills: 0 | Status: SHIPPED | OUTPATIENT
Start: 2022-08-29

## 2022-08-29 RX ADMIN — SODIUM CHLORIDE, PRESERVATIVE FREE 10 ML: 5 INJECTION INTRAVENOUS at 14:02

## 2022-08-29 RX ADMIN — ESCITALOPRAM OXALATE 20 MG: 10 TABLET ORAL at 08:59

## 2022-08-29 RX ADMIN — ASPIRIN 81 MG: 81 TABLET, COATED ORAL at 09:00

## 2022-08-29 RX ADMIN — ANTACID TABLETS 200 MG: 500 TABLET, CHEWABLE ORAL at 11:32

## 2022-08-29 RX ADMIN — CLONAZEPAM 1 MG: 0.5 TABLET ORAL at 09:00

## 2022-08-29 RX ADMIN — PANTOPRAZOLE SODIUM 40 MG: 40 TABLET, DELAYED RELEASE ORAL at 08:59

## 2022-08-29 RX ADMIN — ANTACID TABLETS 200 MG: 500 TABLET, CHEWABLE ORAL at 08:29

## 2022-08-29 RX ADMIN — FERROUS SULFATE TAB 325 MG (65 MG ELEMENTAL FE) 325 MG: 325 (65 FE) TAB at 09:00

## 2022-08-29 NOTE — PROGRESS NOTES
Hospitalist Progress Note    Patient: Rodríguez Negron MRN: [id number]  CSN: [id number]    YOB: 1950  Age: 67 y.o. Sex: female    DOA: 8/26/2022 LOS:  LOS: 3 days                Assessment/Plan     Patient Active Problem List   Diagnosis Code    S/P total knee arthroplasty Z96.659    Status post left hip replacement Z96.642    Status post revision of total hip replacement Z96.649    [facility] S73.005A    Primary hypertension I10    Depression F32. A    GERD (gastroesophageal reflux disease) K21.9        Chief complaint :  Left hip dislocation    Left hip dislocation -  S/p LEFT TOTAL HIP REVISION WITH C-ARM AND SOPHIA on 08/27. Received PT/OT per ortho     Esophageal reflux with history of strictures -  Protonix   Aspiration Precautions      Hypertension -   Well-controlled on home meds     Discharge held yesterday for PT eval.    Disposition : d/c today    Review of systems  General: No fevers or chills. Cardiovascular: No chest pain or pressure. No palpitations. Pulmonary: No shortness of breath. Gastrointestinal: No nausea, vomiting. Physical Exam:  General: Awake, cooperative, no acute distress    HEENT: NC, Atraumatic. PERRLA, anicteric sclerae. Lungs: CTA Bilaterally. No Wheezing/Rhonchi/Rales. Heart:  S1 S2,  No murmur, No Rubs, No Gallops  Abdomen: Soft, Non distended, Non tender. +Bowel sounds,   Extremities: No c/c/e  Psych:   Not anxious or agitated. Neurologic:  No acute neurological deficit.          Vital signs/Intake and Output:  Visit Vitals  /74   Pulse 90   Temp 98.1 °F (36.7 °C)   Resp 16   Ht 5' 10\" (1.778 m)   Wt 102.2 kg (225 lb 4.8 oz)   SpO2 96%   Breastfeeding No   BMI 32.33 kg/m²     Current Shift:  08/29 0701 - 08/29 1900  In: -   Out: 0092 [MZUEK:[id number]]  Last three shifts:  08/27 1901 - 08/29 0700  In: 0   Out: [address] [Urine:1850]            Labs: Results:       Chemistry Recent Labs     08/29/22  0100 08/28/22  0215 08/27/22  1222 08/27/22  0612   GLU 87 97 168* 105*   * 136 136 137   K 4.1 4.2 3.7 3.9    103 103 101   CO2 26 25 26 31   BUN 19* 18 14 15   CREA 1.07 1.29 1.15 1.16   CA 8.4* 8.2* 8.6 9.1   AGAP 4 8 7 5   BUCR 18 14 12 13   * 155*  --  194*   TP 5.2* 5.4*  --  6.5   ALB 2.6* 2.7*  --  3.4   GLOB 2.6 2.7  --  3.1   AGRAT 1.0 1.0  --  1.1      CBC w/Diff Recent Labs     08/29/22  0100 08/28/22  0215 08/27/22  0612   WBC 8.1 9.5 8.9   RBC 2.91* 3.05* 3.90*   HGB 8.3* 8.9* 11.0*   HCT 25.8* 28.1* 35.2    228 241   GRANS 77* 84* 79*   LYMPH 15* 8* 13*   EOS 1 0 1      Cardiac Enzymes No results for input(s): CPK, CKND1, GREGORIO in the last 72 hours. No lab exists for component: CKRMB, TROIP   Coagulation Recent Labs     08/26/22  1610   PTP 13.0   INR 1.0       Lipid Panel No results found for: CHOL, CHOLPOCT, CHOLX, CHLST, CHOLV, 534858, HDL, HDLP, LDL, LDLC, DLDLP, 588681, VLDLC, VLDL, TGLX, TRIGL, TRIGP, TGLPOCT, CHHD, CHHDX   BNP No results for input(s): BNPP in the last 72 hours.    Liver Enzymes Recent Labs     08/29/22  0100   TP 5.2*   ALB 2.6*   *      Thyroid Studies No results found for: T4, T3U, TSH, TSHEXT     Procedures/imaging: see electronic medical records for all procedures/Xrays and details which were not copied into this note but were reviewed prior to creation of Plan

## 2022-08-29 NOTE — PROGRESS NOTES
conducted an initial consultation and Spiritual Assessment for Natan Rucker, who is a 67 y.o.,female. Patients Primary Language is: [place]. According to the patients EMR Religion Affiliation is: [place].     The reason the Patient came to the hospital is:   Patient Active Problem List    Diagnosis Date Noted    Primary hypertension 08/26/2022    Depression 08/26/2022    GERD (gastroesophageal reflux disease) 08/26/2022    [facility] 07/11/2022    Status post revision of total hip replacement 05/25/2022    Status post left hip replacement 05/23/2022    S/P total knee arthroplasty 02/04/2013        The  provided the following Interventions:  Initiated a relationship of care and support. Explored issues of cristi, belief, spirituality and Latter day/ritual needs while hospitalized. Listened empathically. Provided chaplaincy education. Provided information about Spiritual Care Services. Offered prayer and assurance of continued prayers on patients behalf. Chart reviewed. The following outcomes were achieved:  Patient shared limited information about both their medical narrative and spiritual journey/beliefs. Patient processed feeling about current hospitalization. Patient expressed gratitude for pastoral care visit. Assessment:  Patient does not have any Latter day/cultural needs that will affect patients preferences in health care. There are no further spiritual or Latter day issues which require intervention at this time. Plan:  Chaplains will continue to follow and will provide pastoral care on an as needed/requested basis.  recommends bedside caregivers page  on duty if patient shows signs of acute spiritual or emotional distress.       Sister Raisa Broussard, [address] [facility], [address] [phone]

## 2022-08-29 NOTE — PROGRESS NOTES
0830   Pt is awake, alert, oriented x4. Resting in bed. Pain level 0/10. Lungs are clear. Abdomen soft with active BS. Pt has honey comb dressing to left hip. Dressing is dry and intact. Pt has genao cath drainig clear yellow urine. Pt has d/c order from Ortho after pt passes PT.    0920   Pt has D/C order but pt's activity is Complete bedrest.  Left message for MAIA Masters. 0145   RN spoke to Dr Hasmukh Montes. Complete Bedrest order was d/keshawn. Pt's activity is WBAT. 1010   Pt was seen by PT/OT. Pt ambulated in the in the room but has not cleared PT. Pt sat on chair. .  Pt does not want to go home with genao. As per PT, pt can void via BSC. Pt wants genao d/keshawn and try to void. Will let Dr Peterson Engel know. 56   Dr Peterson Engel ordered  to d/c genao. Genao cath removed. Advised pt to drink po fluids. 1055   As per Dr Peterson Engel, if pt passes PT and voids , pt can go home. 200   Order for L hip Xray in chart. Checked  with MAIA Estrada. As per PA, Xray is needed on pt. Xray was made aware. 1359   Result of Hip Xray relayed to Dr Hasmukh Montes. 1425  Pt was seen by PT/OT. Ahsan Espinoza Pt ambulated to BR and voided 200 ml of clear yellow urine. Pt ambulated in hallway then back in bed. 1445  Bladder scan done with 187 ml in bladder. 1500  Dr Peterson Engel was made aware of above. OK to D/C pt as per Dr Peterson Engel. 1626  INt removed. D/C instructions given by Katina Skelton RN. Pt d/keshawn per wheelchair accompanied  by .

## 2022-08-29 NOTE — PROGRESS NOTES
Progress Note        Patient: Shandra Montanez MRN: [id number]  SSN: [id number]    YOB: 1950  Age: 67 y.o. Sex: female      2 Days Post-Op status post Procedure(s) (LRB):  LEFT TOTAL HIP REVISION WITH C-ARM AND SOPHIA (Left)    Admit Date: 2022  Admit Diagnosis: Hip dislocation, left ([address] [S73.005A]    Subjective:      Doing well. No complaints. No SOB. No Chest Pain. No Nausea or Vomiting. Has not be up with therapy yet due to no PT available yesterday to do her initial evaluation. Objective:        Temp (24hrs), Av.9 °F (36.6 °C), Min:97.2 °F (36.2 °C), Max:98.5 °F (36.9 °C)    Body mass index is 32.33 kg/m². Patient Vitals for the past 12 hrs:   BP Temp Pulse Resp SpO2 Weight   22 0822 (!) 129/51 98.5 °F (36.9 °C) 86 16 94 % --   22 0712 (!) 133/58 98.5 °F (36.9 °C) 85 16 100 % --   22 0457 -- -- -- -- -- 102.2 kg (225 lb 4.8 oz)   22 0212 (!) 149/61 -- 89 16 95 % --   22 2213 (!) 146/61 98.2 °F (36.8 °C) 83 16 95 % --     Recent Labs     22  0100 22  0612 22  1610   HGB 8.3*   < > 10.4*   HCT 25.8*   < > 32.2*   INR  --   --  1.0   *   < > 136   K 4.1   < > 3.6      < > 102   CO2 26   < > 28   BUN 19*   < > 19*   CREA 1.07   < > 1.34*   GLU 87   < > 112*    < > = values in this interval not displayed. Physical Exam:  Vital Signs are Stable. No Acute Distress. Alert and Oriented. Negative Homans sign. Toes AROM Full. Neurovascular exam is normal.    Dressing is Clean, Dry, and Intact.     Assessment/Plan:     Stable s/p revision left total hip replacement  Continue PT/OT  Continue ice to operative site  Continue current pain management  D/c planning - from ortho perspective, okay to discharge home once cleared by PT      Discharge Plan: Home once cleared by PT    Signed By: MAIA Rowan     2022

## 2022-08-29 NOTE — PROGRESS NOTES
Problem: Mobility Impaired (Adult and Pediatric)  Goal: *Acute Goals and Plan of Care (Insert Text)  Description: Physical Therapy Goals  Initiated 8/29/2022 and to be accomplished within 7 day(s)  1. Patient will move from supine to sit and sit to supine  in bed with contact guard assist.    2.  Patient will transfer from bed to chair and chair to bed with supervision/set-up using the least restrictive device. 3.  Patient will perform sit to stand with supervision/set-up. 4.  Patient will ambulate with supervision/set-up for 100 feet with the least restrictive device. 5.  Patient will ascend/descend 3 stairs with 1 handrail(s) with minimal assistance/contact guard assist.    PLOF: ambulated with a single point cane, 3 steps to enter home,  is able to assist as needed, frequent falls       Outcome: Progressing Towards Goal   []  Patient has met MD mobilization crisarah for d/c home   []  Recommend HH with 24 hour adult care   [x]  Benefit from additional acute PT session to address:  gait training, stairs and bed mobility    PHYSICAL THERAPY EVALUATION    Patient: Kiki Arnold (26 y.o. female)  Date: 8/29/2022  Primary Diagnosis: Hip dislocation, left ([facility] [address] [S73.005A]  Procedure(s) (LRB):  LEFT TOTAL HIP REVISION WITH C-ARM AND SOPHIA (Left) 2 Days Post-Op   Precautions: fall, no ROM past point of tension, hip precautions     WBAT  PLOF: see above    ASSESSMENT :    Patient with 2 bedrest orders on initial chart review. Spoke with nurse, Domonique Dee, who contacted orthopedic surgeon and bedrest was discontinued. Based on the objective data described below, the patient presents with decreased strength, balance and activity tolerance resulting in decreased independence with functional mobility. Pt required mod A for supine to sit transfer and when first sitting edge of bed lost her balance posteriorly to the right multiple times.   With increased time patients balance did improve to where she could sit with minimal UE support. Pt stood with mod A and required mod A to remain standing due to a heavy posterior lean. Pt then ambulated 5 feet with RW and min A with short shuffling steps. After a seated rest break patient stood with CGA and then ambulated 18 feet with RW and CGA. Pt sat edge of bed for several minutes with fair static balance and then transferred to bedside chair with RW and SBA. Pt was left sitting in armchair next to her bed with needs in reach and  present. Patient will benefit from skilled intervention to address the above impairments. Patient's rehabilitation potential is considered to be Good  Factors which may influence rehabilitation potential include:   []         None noted  []         Mental ability/status  [x]         Medical condition  []         Home/family situation and support systems  [x]         Safety awareness  []         Pain tolerance/management  []         Other:      PLAN :  Recommendations and Planned Interventions:   [x]           Bed Mobility Training             []    Neuromuscular Re-Education  [x]           Transfer Training                   []    Orthotic/Prosthetic Training  [x]           Gait Training                          [x]    Modalities  [x]           Therapeutic Exercises           []    Edema Management/Control  [x]           Therapeutic Activities            [x]    Family Training/Education  [x]           Patient Education  []           Other (comment):    Frequency/Duration: Patient will be followed by physical therapy 1-2 times per day/4-7 days per week to address goals. Discharge Recommendations: Home Physical Therapy  Further Equipment Recommendations for Discharge: rolling walker     SUBJECTIVE:   Patient stated I just want to get out of this bed and go home.     OBJECTIVE DATA SUMMARY:     Past Medical History:   Diagnosis Date    Arthritis     osteo    Difficult intubation     esophagus needs to be stretch every 2 years, had done 03/2022    GERD (gastroesophageal reflux disease)     relux, and hiatal hernia    Hypertension     Osteoarthritis of right knee 2/1/2013    Other ill-defined conditions(799.89)     hiatal hernia    Psychiatric disorder     depression    S/P total knee arthroplasty 02/04/2013     Past Surgical History:   Procedure Laterality Date    HX CATARACT REMOVAL      left eye    HX CHOLECYSTECTOMY      laparoscopic    HX COLONOSCOPY      had 2 done, f/u in 10 years    HX HEENT  04/2022    repair detached retena    HX HYSTERECTOMY      TVH    HX KNEE ARTHROSCOPY      right knee replacement    HX OOPHORECTOMY      BSO    HX ORTHOPAEDIC      manipulation of left shoulder under anesthesia     Barriers to Learning/Limitations: None  Compensate with: N/A  Home Situation:  Home Situation  Home Environment: Private residence  # Steps to Enter: 3  One/Two Story Residence: One story  Living Alone: No  Support Systems: Spouse/Significant Other  Patient Expects to be Discharged to[de-identified] Home with home health  Current DME Used/Available at Home: [place] Behavior:  Neurologic State: Alert; Appropriate for age      Strength:    Strength:  (right LE 4/5, left 2+ to 3/5)      Tone & Sensation:   Tone: Normal   Sensation: Intact       Range Of Motion:  AROM: Within functional limits (maintaining hip precautions due to frequent dislocation and not moving to the point of feeling tension in hip per MD orders)      Functional Mobility:  Bed Mobility:  Supine to Sit: Moderate assistance     Transfers:  Sit to Stand:  (initial sit to stand mod A with heavy posterior lean; subsequent transfers with SBA/CGA)  Stand to Sit: Contact guard assistance (verbal cues for hand placement)  Bed to Chair: Contact guard assistance     Balance:   Sitting:  (initially poor however progressed to fair)  Standing: With support  Standing - Static:  (fair-)  Standing - Dynamic :  (fair-)  Ambulation/Gait Training:  Distance (ft): 18 Feet (ft) (5)  Assistive Device: Walker, rolling  Ambulation - Level of Assistance: Minimal assistance  Gait Abnormalities: Decreased step clearance; Antalgic; Step to gait  Left Side Weight Bearing: As tolerated  Step Length: Left shortened;Right shortened     Therapeutic Exercises: Ankle pumps, LAQ, seated heel raises  Pain:  Pain level pre-treatment: 0/10   Pain level post-treatment: 0/10   Pain Intervention(s) : n/a    Activity Tolerance:   fair  Please refer to the flowsheet for vital signs taken during this treatment. After treatment:   [x]         Patient left in no apparent distress sitting up in chair  []         Patient left in no apparent distress in bed  [x]         Call bell left within reach  [x]         Nursing notified  [x]         Caregiver present  []         Bed alarm activated  []         SCDs applied    COMMUNICATION/EDUCATION:   [x]         Role of Physical Therapy in the acute care setting. [x]         Fall prevention education was provided and the patient/caregiver indicated understanding. [x]         Patient/family have participated as able in goal setting and plan of care. [x]         Patient/family agree to work toward stated goals and plan of care. []         Patient understands intent and goals of therapy, but is neutral about his/her participation. []         Patient is unable to participate in goal setting/plan of care: ongoing with therapy staff.  []         Other:     Thank you for this referral.  Ann Linton, PT   Time Calculation: 25 mins      Eval Complexity: History: HIGH Complexity :3+ comorbidities / personal factors will impact the outcome/ POC Exam:MEDIUM Complexity : 3 Standardized tests and measures addressing body structure, function, activity limitation and / or participation in recreation  Presentation: MEDIUM Complexity : Evolving with changing characteristics  Clinical Decision Making:Medium Complexity    Overall Complexity:MEDIUM

## 2022-08-29 NOTE — DISCHARGE INSTRUCTIONS
DISCHARGE SUMMARY from Nurse    PATIENT INSTRUCTIONS:    After general anesthesia or intravenous sedation, for 24 hours or while taking prescription Narcotics:  Limit your activities  Do not drive and operate hazardous machinery  Do not make important personal or business decisions  Do  not drink alcoholic beverages  If you have not urinated within 8 hours after discharge, please contact your surgeon on call. Report the following to your surgeon:  Excessive pain, swelling, redness or odor of or around the surgical area  Temperature over 100.5  Nausea and vomiting lasting longer than 4 hours or if unable to take medications  Any signs of decreased circulation or nerve impairment to extremity: change in color, persistent  numbness, tingling, coldness or increase pain  Any questions    What to do at Home:    *  Please give a list of your current medications to your Primary Care Provider. *  Please update this list whenever your medications are discontinued, doses are      changed, or new medications (including over-the-counter products) are added. *  Please carry medication information at all times in case of emergency situations. These are general instructions for a healthy lifestyle:    No smoking/ No tobacco products/ Avoid exposure to second hand smoke  Surgeon General's Warning:  Quitting smoking now greatly reduces serious risk to your health. Obesity, smoking, and sedentary lifestyle greatly increases your risk for illness    A healthy diet, regular physical exercise & weight monitoring are important for maintaining a healthy lifestyle    The discharge information has been reviewed with the patient. The patient verbalized understanding. Discharge medications reviewed with the patient and appropriate educational materials and side effects teaching were provided.   ___________________________________________________________________________________________________________________________________

## 2022-08-29 NOTE — PROGRESS NOTES
Progress Note     Patient: Bry Reece MRN: [id number]  SSN: [id number]    YOB: 1950  Age: 67 y.o. Sex: female      POD:    2 Days Post-Op  S/P:    Procedure(s):  LEFT TOTAL HIP REVISION WITH C-ARM AND SOPHIA     Subjective:   Pt is without complaints of pain. Objective:     Patient Vitals for the past 12 hrs:   BP Temp Pulse Resp SpO2 Weight   08/29/22 1128 136/74 98.1 °F (36.7 °C) 90 16 96 % --   08/29/22 0822 (!) 129/51 98.5 °F (36.9 °C) 86 16 94 % --   08/29/22 0712 (!) 133/58 98.5 °F (36.9 °C) 85 16 100 % --   08/29/22 0457 -- -- -- -- -- 102.2 kg (225 lb 4.8 oz)     Recent Labs     08/29/22  0100   HGB 8.3*   HCT 25.8*   *   K 4.1      CO2 26   BUN 19*   CREA 1.07   GLU 87       Pt sitting in chair. Neurovascularly intact. Assessment:     Awake and alert. In good spirits. X rays satisfactory. I reviewed x rays with Elton Claude - [employer] rep who notes that the x rays are as expected and without complication. Pt scheduled for discharge today. Plan:   1. Continue pain management/ ice to operative area. 2. Continue to progress with PT/OT. 3. Discharge planning to home with home health.

## 2022-08-29 NOTE — PROGRESS NOTES
Pt called for assistance with urination. Pt placed on bedpan, no success with urination, pt was pressing down on bladder and has urge to urinate.

## 2022-08-29 NOTE — PROGRESS NOTES
.IV to PO Conversion Recommendation     Patient: Rj Green   MRN#: [id number]   Admission Date: 099944     IV TO PO  Medication(s) Pantoprazole   Oral Meds  Yes   Diet:     Active Orders   Diet    ADULT DIET Regular      TEMP 98.5 °F (36.9 °C)   WBC Lab Results   Component Value Date/Time    WBC 8.1 08/29/2022 01:00 AM           Pharmacy Dosing Services:  Summary:   Does the patient meet all criteria for IV to PO switch as listed below? Yes   If no, list:     Is the patient excluded from IV to PO automatic switch based on criteria listed below? No   If yes, list:       Criteria for IV to PO switch - Antibiotics   Received IV therapy for at least 24 hours   2. Functioning GI tract   Taking scheduled oral medications  Tolerating diet more advanced than clear liquids   3. No signs/symptoms of shock  No vasopressor support    Criteria for IV to PO switch - Nonantibiotics   Functioning GI tract   Taking scheduled oral medications  Toleratind duet more advanced than clear liquids  2. No signs/symptoms of shock  No vasopressor support        3. No seizures for >72 hours (antiepileptic medications only)    Exclusion Criteria   Patient is being treated for an infection that requires IV therapy such as: endocarditis, osteomyelitis, meningitis, pancreatitis (antibiotics only)   NG tube with continous suction   Nausea and/or vomiting or severe diarrhea within past 24 hours  Malabsorption syndromes, partial or total gastrectomy, ileus, gastric outlet or bowel obstruction  Active GI bleeding   Significant painful oral ulceration  Unable to swallow  On total parenteral nutrition with a NPO order  NPO  Febrile in last 24 hours (antibiotics only)  Clinically deteriorating or unstable (antibiotics only)      Assessment/Recommendation:    Protonix 40 mg PO BID    This IV to PO conversion is based on the [facility] P&T approved automatic conversion policy for eligible patients. Please call with questions.     Tasia Haji [facility]  Clinical Pharmacist  [phone]

## 2022-08-29 NOTE — PROGRESS NOTES
D/C plan: Home w/ [facility]. Spouse to transport. CM met w/ pt and spouse at home. Informed pt and spouse that it was [facility] that the pt has previously used. They would like to use them again. Orders placed. Referral sent. Care Management Interventions  PCP Verified by CM: Yes (Dr. Moses Laughlin )  Palliative Care Criteria Met (RRAT>21 & CHF Dx)?: No  Mode of Transport at Discharge:  Other (see comment)  Transition of Care Consult (CM Consult): Home Health (.)  [address] [place] [address] Road: No  Reason Outside [place]:  (Pt has used [facility] previously and would like to use them again. )  Discharge Durable Medical Equipment: No (Pt has a RW, rollator and a cane.)  Physical Therapy Consult: Yes (s/p surgery. )  Occupational Therapy Consult: Yes (s/p surgery.)  Speech Therapy Consult: No  Support Systems: Spouse/Significant Other  Confirm Follow Up Transport: Family  The Plan for Transition of Care is Related to the Following Treatment Goals : Home w/ [facility]  The Patient and/or Patient Representative was Provided with a Choice of Provider and Agrees with the Discharge Plan?: Yes (The pt is alert and oriented. )  Freedom of Choice List was Provided with Basic Dialogue that Supports the Patient's Individualized Plan of Care/Goals, Treatment Preferences and Shares the Quality Data Associated with the Providers?:  ([facility] )  Discharge Location  Patient Expects to be Discharged to[de-identified] Home with home health

## 2022-08-29 NOTE — PROGRESS NOTES
Problem: Mobility Impaired (Adult and Pediatric)  Goal: *Acute Goals and Plan of Care (Insert Text)  Description: Physical Therapy Goals  Initiated 8/29/2022 and to be accomplished within 7 day(s)  1. Patient will move from supine to sit and sit to supine  in bed with contact guard assist.    2.  Patient will transfer from bed to chair and chair to bed with supervision/set-up using the least restrictive device. 3.  Patient will perform sit to stand with supervision/set-up. 4.  Patient will ambulate with supervision/set-up for 100 feet with the least restrictive device. 5.  Patient will ascend/descend 3 stairs with 1 handrail(s) with minimal assistance/contact guard assist.    PLOF: ambulated with a single point cane, 3 steps to enter home,  is able to assist as needed, frequent falls       8/29/2022 1531 by Dakota Meneses PT  Outcome: Progressing Towards Goal  [x]  Patient has met MD brenda eden for d/c home   []  Recommend HH with 24 hour adult care   []  Benefit from additional acute PT session to address:      PHYSICAL THERAPY TREATMENT    Patient: Yesica Linares (22 y.o. female)  Date: 8/29/2022  Diagnosis: Hip dislocation, left ([facility] [address] [S73.005A] Hip dislocation, left, initial encounter ([facility] [address]  Procedure(s) (LRB):  LEFT TOTAL HIP REVISION WITH C-ARM AND SOPHIA (Left) 2 Days Post-Op  Precautions:  fall  PLOF: see above    ASSESSMENT:  Pt is in bed on arrival and agreeable to PT treatment session. Pt transferred supine to sit with minimal assistance and verbal cues for technique. Pt stood with CGA and initially anxious and verbalizing that she was falling backwards, when patient was encouraged to correct her imbalance herself she was able to. She then ambulated 10 feet to the bathroom with RW and CGA. Pt demonstrated increased foot clearance from evaluation. After toileting pt ambulated 60 feet with RW and CGA/SBA.   After a seated rest break patient stood with SBA and ascended/descended a portable step twice with RW and CGA. Pt requested to sit up in the chair at the end of the treatment session. Pt was left sitting up with needs in reach, nursing notified and  present. Progression toward goals:   [x]      Improving appropriately and progressing toward goals  []      Improving slowly and progressing toward goals  []      Not making progress toward goals and plan of care will be adjusted     PLAN:  Patient continues to benefit from skilled intervention to address the above impairments. Continue treatment per established plan of care. Discharge Recommendations: Home Physical Therapy  Further Equipment Recommendations for Discharge:  rolling walker    AMPAC: Based on an AM-PAC score of 15/20 omitting stairs and their current functional mobility deficits, it is recommended that the patient have 2-3 sessions per week of Physical Therapy at d/c to increase the patient's independence. This AMPAC score should be considered in conjunction with interdisciplinary team recommendations to determine the most appropriate discharge setting. Patient's social support, diagnosis, medical stability, and prior level of function should also be taken into consideration. SUBJECTIVE:   Patient stated I feel much better than this morning.     OBJECTIVE DATA SUMMARY:   Critical Behavior:  Neurologic State: Alert, Appropriate for age   Functional Mobility Training:  Bed Mobility:  Supine to Sit: Moderate assistance  Transfers:  Sit to Stand:  (initial sit to stand mod A with heavy posterior lean; subsequent transfers with SBA/CGA)  Stand to Sit: Contact guard assistance (verbal cues for hand placement)  Bed to Chair: Contact guard assistance  Balance:  Sitting:  (initially poor however progressed to fair)  Standing: With support  Standing - Static:  (fair-)  Standing - Dynamic :  (fair-)   Range Of Motion:   AROM: Within functional limits (maintaining hip precautions due to frequent dislocation and not moving to the point of feeling tension in hip per MD orders)       Ambulation/Gait Training:  Distance (ft): 18 Feet (ft) (5)  Assistive Device: Walker, rolling  Ambulation - Level of Assistance: Minimal assistance  Gait Abnormalities: Decreased step clearance; Antalgic; Step to gait  Left Side Weight Bearing: As tolerated  Step Length: Left shortened;Right shortened  Therapeutic Exercises:         EXERCISE   Sets   Reps   Active Active Assist   Passive Self ROM   Comments   Ankle Pumps 1 10  [x] [] [] []    Quad Sets/Glut Sets    [] [] [] [] Hold for 5 secs   Hamstring Sets   [] [] [] []    Short Arc Quads   [] [] [] []    Heel Slides   [] [] [] []    Straight Leg Raises   [] [] [] []    Hip Add   [] [] [] [] Hold for 5 secs, w/ pillow squeeze   Long Arc Quads   [] [] [] []    Seated Marching   [] [] [] []    Standing Marching   [] [] [] []       [] [] [] []        Pain:  Pain level pre-treatment: 0/10  Pain level post-treatment: 0/10   Pain Intervention(s): n/a    Activity Tolerance:   fair  Please refer to the flowsheet for vital signs taken during this treatment. After treatment:   [x] Patient left in no apparent distress sitting up in chair  [] Patient left in no apparent distress in bed  [x] Call bell left within reach  [x] Nursing notified  [x] Caregiver present  [] Bed alarm activated  [] SCDs applied      COMMUNICATION/EDUCATION:   [x]         Role of Physical Therapy in the acute care setting. [x]         Fall prevention education was provided and the patient/caregiver indicated understanding. [x]         Patient/family have participated as able in working toward goals and plan of care. [x]         Patient/family agree to work toward stated goals and plan of care. []         Patient understands intent and goals of therapy, but is neutral about his/her participation. []         Patient is unable to participate in stated goals/plan of care: ongoing with therapy staff.   [] Other:        Cris Jaime, PT   Time Calculation: 38 mins    Bill Logan AM-PAC® Basic Mobility Inpatient Short Form (6-Clicks) Version 2    How much HELP from another person does the patient currently need    (If the patient hasn't done an activity recently, how much help from another person do you think he/she would need if he/she tried?)   Total (Total A or Dep)   A Lot  (Mod to Max A)   A Little (Sup or Min A)   None (Mod I to I)   Turning from your back to your side while in a flat bed without using bedrails? [] 1 [] 2 [x] 3 [] 4   2. Moving from lying on your back to sitting on the side of a flat bed without using bedrails? [] 1 [] 2 [x] 3 [] 4   3. Moving to and from a bed to a chair (including a wheelchair)? [] 1 [] 2 [x] 3 [] 4   4. Standing up from a chair using your arms (e.g., wheelchair, or bedside chair)? [] 1 [] 2 [x] 3 [] 4   5. Walking in hospital room? [] 1 [] 2 [x] 3 [] 4   6. Climbing 3-5 steps with a railing?+   [] 1 [] 2 [] 3 [] 4   +If stair climbing cannot be assessed, skip item #6. Sum responses from items 1-5.

## 2022-08-29 NOTE — PROGRESS NOTES
Problem: Falls - Risk of  Goal: *Absence of Falls  Description: Document Lauren Minimaría elena Fall Risk and appropriate interventions in the flowsheet.   Outcome: Progressing Towards Goal  Note: Fall Risk Interventions:  Mobility Interventions: Bed/chair exit alarm    Mentation Interventions: Adequate sleep, hydration, pain control    Medication Interventions: Teach patient to arise slowly    Elimination Interventions: Bed/chair exit alarm, Elevated toilet seat    History of Falls Interventions: Door open when patient unattended

## 2022-08-29 NOTE — PROGRESS NOTES
Problem: Falls - Risk of  Goal: *Absence of Falls  Description: Document Musaderrek Servin Fall Risk and appropriate interventions in the flowsheet. Outcome: Progressing Towards Goal  Note: Fall Risk Interventions:  Mobility Interventions: Bed/chair exit alarm, Communicate number of staff needed for ambulation/transfer, Patient to call before getting OOB    Mentation Interventions: Adequate sleep, hydration, pain control, Door open when patient unattended    Medication Interventions: Evaluate medications/consider consulting pharmacy, Patient to call before getting OOB    Elimination Interventions: Bed/chair exit alarm, Call light in reach, Patient to call for help with toileting needs    History of Falls Interventions: Door open when patient unattended, Evaluate medications/consider consulting pharmacy         Problem: General Medical Care Plan  Goal: *Vital signs within specified parameters  Outcome: Progressing Towards Goal  Goal: *Labs within defined limits  Outcome: Progressing Towards Goal  Goal: *Absence of infection signs and symptoms  Outcome: Progressing Towards Goal  Goal: *Optimal pain control at patient's stated goal  Outcome: Progressing Towards Goal  Goal: *Skin integrity maintained  Outcome: Progressing Towards Goal  Goal: *Fluid volume balance  Outcome: Progressing Towards Goal  Goal: *Optimize nutritional status  Outcome: Progressing Towards Goal  Goal: *Anxiety reduced or absent  Outcome: Progressing Towards Goal  Goal: *Progressive mobility and function (eg: ADL's)  Outcome: Progressing Towards Goal     Problem: Pressure Injury - Risk of  Goal: *Prevention of pressure injury  Description: Document [place] Scale and appropriate interventions in the flowsheet.   Outcome: Progressing Towards Goal  Note: Pressure Injury Interventions:       Moisture Interventions: Absorbent underpads    Activity Interventions: Pressure redistribution bed/mattress(bed type)    Mobility Interventions: HOB 30 degrees or less, Pressure redistribution bed/mattress (bed type)    Nutrition Interventions: Document food/fluid/supplement intake, Offer support with meals,snacks and hydration    Friction and Shear Interventions: HOB 30 degrees or less                Problem: Patient Education: Go to Patient Education Activity  Goal: Patient/Family Education  Outcome: Progressing Towards Goal

## 2022-08-29 NOTE — ROUTINE PROCESS
Bedside shift change report given to Dada Bolanos (oncoming nurse) by Elana Quan RN (offgoing nurse). Report included the following information SBAR, Procedure Summary, Intake/Output, MAR, and Recent Results.

## 2022-08-29 NOTE — PROGRESS NOTES
Bladder scan 361 ml, Dr Halley George paged. RN to continue to monitor pt  Pt sleeping at this moment, breathing non-labored, call light within pt's reach.   [id number]: pt with urge to urinate, unsuccessful, bladder scan 801 ml; Dr. Halley George paged

## 2022-09-02 LAB
ATRIAL RATE: 78 BPM
CALCULATED P AXIS, ECG09: 45 DEGREES
CALCULATED R AXIS, ECG10: -20 DEGREES
CALCULATED T AXIS, ECG11: 52 DEGREES
DIAGNOSIS, 93000: NORMAL
P-R INTERVAL, ECG05: 226 MS
Q-T INTERVAL, ECG07: 396 MS
QRS DURATION, ECG06: 96 MS
QTC CALCULATION (BEZET), ECG08: 451 MS
VENTRICULAR RATE, ECG03: 78 BPM

## 2022-09-11 ENCOUNTER — ANESTHESIA (OUTPATIENT)
Dept: SURGERY | Age: 72
End: 2022-09-11
Payer: MEDICARE

## 2022-09-11 ENCOUNTER — APPOINTMENT (OUTPATIENT)
Dept: GENERAL RADIOLOGY | Age: 72
End: 2022-09-11
Attending: PHYSICIAN ASSISTANT
Payer: MEDICARE

## 2022-09-11 ENCOUNTER — APPOINTMENT (OUTPATIENT)
Dept: GENERAL RADIOLOGY | Age: 72
End: 2022-09-11
Attending: ORTHOPAEDIC SURGERY
Payer: MEDICARE

## 2022-09-11 ENCOUNTER — HOSPITAL ENCOUNTER (OUTPATIENT)
Age: 72
Setting detail: OBSERVATION
Discharge: HOME HEALTH | End: 2022-09-13
Attending: STUDENT IN AN ORGANIZED HEALTH CARE EDUCATION/TRAINING PROGRAM | Admitting: ORTHOPAEDIC SURGERY
Payer: MEDICARE

## 2022-09-11 ENCOUNTER — ANESTHESIA EVENT (OUTPATIENT)
Dept: SURGERY | Age: 72
End: 2022-09-11
Payer: MEDICARE

## 2022-09-11 DIAGNOSIS — S73.005A HIP DISLOCATION, LEFT, INITIAL ENCOUNTER (HCC): Primary | ICD-10-CM

## 2022-09-11 LAB
ANION GAP SERPL CALC-SCNC: 7 MMOL/L (ref 3–18)
BASOPHILS # BLD: 0 K/UL (ref 0–0.1)
BASOPHILS NFR BLD: 0 % (ref 0–2)
BUN SERPL-MCNC: 15 MG/DL (ref 7–18)
BUN/CREAT SERPL: 14 (ref 12–20)
CALCIUM SERPL-MCNC: 8.6 MG/DL (ref 8.5–10.1)
CHLORIDE SERPL-SCNC: 99 MMOL/L (ref 100–111)
CO2 SERPL-SCNC: 29 MMOL/L (ref 21–32)
CREAT SERPL-MCNC: 1.11 MG/DL (ref 0.6–1.3)
DIFFERENTIAL METHOD BLD: ABNORMAL
EOSINOPHIL # BLD: 0.3 K/UL (ref 0–0.4)
EOSINOPHIL NFR BLD: 5 % (ref 0–5)
ERYTHROCYTE [DISTWIDTH] IN BLOOD BY AUTOMATED COUNT: 13.6 % (ref 11.6–14.5)
GLUCOSE SERPL-MCNC: 97 MG/DL (ref 74–99)
HCT VFR BLD AUTO: 26.3 % (ref 35–45)
HGB BLD-MCNC: 8.3 G/DL (ref 12–16)
IMM GRANULOCYTES # BLD AUTO: 0 K/UL (ref 0–0.04)
IMM GRANULOCYTES NFR BLD AUTO: 0 % (ref 0–0.5)
LYMPHOCYTES # BLD: 1.4 K/UL (ref 0.9–3.6)
LYMPHOCYTES NFR BLD: 22 % (ref 21–52)
MCH RBC QN AUTO: 28.2 PG (ref 24–34)
MCHC RBC AUTO-ENTMCNC: 31.6 G/DL (ref 31–37)
MCV RBC AUTO: 89.5 FL (ref 78–100)
MONOCYTES # BLD: 0.4 K/UL (ref 0.05–1.2)
MONOCYTES NFR BLD: 7 % (ref 3–10)
NEUTS SEG # BLD: 4.2 K/UL (ref 1.8–8)
NEUTS SEG NFR BLD: 66 % (ref 40–73)
NRBC # BLD: 0 K/UL (ref 0–0.01)
NRBC BLD-RTO: 0 PER 100 WBC
PLATELET # BLD AUTO: 329 K/UL (ref 135–420)
PMV BLD AUTO: 8.9 FL (ref 9.2–11.8)
POTASSIUM SERPL-SCNC: 3.6 MMOL/L (ref 3.5–5.5)
RBC # BLD AUTO: 2.94 M/UL (ref 4.2–5.3)
SODIUM SERPL-SCNC: 135 MMOL/L (ref 136–145)
WBC # BLD AUTO: 6.3 K/UL (ref 4.6–13.2)

## 2022-09-11 PROCEDURE — 74011000250 HC RX REV CODE- 250: Performed by: ORTHOPAEDIC SURGERY

## 2022-09-11 PROCEDURE — 77030013708 HC HNDPC SUC IRR PULS STRY –B: Performed by: ORTHOPAEDIC SURGERY

## 2022-09-11 PROCEDURE — 77030020782 HC GWN BAIR PAWS FLX 3M -B: Performed by: ORTHOPAEDIC SURGERY

## 2022-09-11 PROCEDURE — 74011250636 HC RX REV CODE- 250/636: Performed by: NURSE ANESTHETIST, CERTIFIED REGISTERED

## 2022-09-11 PROCEDURE — 77030008683 HC TU ET CUF COVD -A: Performed by: STUDENT IN AN ORGANIZED HEALTH CARE EDUCATION/TRAINING PROGRAM

## 2022-09-11 PROCEDURE — 77030026044 HC TIP IRR PULS STRY -A: Performed by: ORTHOPAEDIC SURGERY

## 2022-09-11 PROCEDURE — 77030040361 HC SLV COMPR DVT MDII -B: Performed by: ORTHOPAEDIC SURGERY

## 2022-09-11 PROCEDURE — 77030003666 HC NDL SPINAL BD -A: Performed by: ORTHOPAEDIC SURGERY

## 2022-09-11 PROCEDURE — 74011250636 HC RX REV CODE- 250/636: Performed by: ORTHOPAEDIC SURGERY

## 2022-09-11 PROCEDURE — G0378 HOSPITAL OBSERVATION PER HR: HCPCS

## 2022-09-11 PROCEDURE — 77030031139 HC SUT VCRL2 J&J -A: Performed by: ORTHOPAEDIC SURGERY

## 2022-09-11 PROCEDURE — 76060000033 HC ANESTHESIA 1 TO 1.5 HR: Performed by: ORTHOPAEDIC SURGERY

## 2022-09-11 PROCEDURE — C1776 JOINT DEVICE (IMPLANTABLE): HCPCS | Performed by: ORTHOPAEDIC SURGERY

## 2022-09-11 PROCEDURE — 74011000250 HC RX REV CODE- 250: Performed by: NURSE ANESTHETIST, CERTIFIED REGISTERED

## 2022-09-11 PROCEDURE — 74011250637 HC RX REV CODE- 250/637: Performed by: ORTHOPAEDIC SURGERY

## 2022-09-11 PROCEDURE — 77030013079 HC BLNKT BAIR HGGR 3M -A: Performed by: STUDENT IN AN ORGANIZED HEALTH CARE EDUCATION/TRAINING PROGRAM

## 2022-09-11 PROCEDURE — 85025 COMPLETE CBC W/AUTO DIFF WBC: CPT

## 2022-09-11 PROCEDURE — 73502 X-RAY EXAM HIP UNI 2-3 VIEWS: CPT

## 2022-09-11 PROCEDURE — 76010000149 HC OR TIME 1 TO 1.5 HR: Performed by: ORTHOPAEDIC SURGERY

## 2022-09-11 PROCEDURE — 77030002933 HC SUT MCRYL J&J -A: Performed by: ORTHOPAEDIC SURGERY

## 2022-09-11 PROCEDURE — 80048 BASIC METABOLIC PNL TOTAL CA: CPT

## 2022-09-11 PROCEDURE — 77030008477 HC STYL SATN SLP COVD -A: Performed by: STUDENT IN AN ORGANIZED HEALTH CARE EDUCATION/TRAINING PROGRAM

## 2022-09-11 PROCEDURE — 99285 EMERGENCY DEPT VISIT HI MDM: CPT

## 2022-09-11 PROCEDURE — 77030031140 HC SUT VCRL3 J&J -A: Performed by: ORTHOPAEDIC SURGERY

## 2022-09-11 PROCEDURE — 2709999900 HC NON-CHARGEABLE SUPPLY: Performed by: ORTHOPAEDIC SURGERY

## 2022-09-11 PROCEDURE — 51798 US URINE CAPACITY MEASURE: CPT

## 2022-09-11 PROCEDURE — 73501 X-RAY EXAM HIP UNI 1 VIEW: CPT

## 2022-09-11 PROCEDURE — 76210000063 HC OR PH I REC FIRST 0.5 HR: Performed by: ORTHOPAEDIC SURGERY

## 2022-09-11 PROCEDURE — 77030006643: Performed by: STUDENT IN AN ORGANIZED HEALTH CARE EDUCATION/TRAINING PROGRAM

## 2022-09-11 DEVICE — HEAD FEM DIA28MM +8MM OFFSET HIP CO CHROM POLYETH TAPR LO: Type: IMPLANTABLE DEVICE | Site: HIP | Status: FUNCTIONAL

## 2022-09-11 DEVICE — IMPLANTABLE DEVICE: Type: IMPLANTABLE DEVICE | Site: HIP | Status: FUNCTIONAL

## 2022-09-11 RX ORDER — OXYCODONE AND ACETAMINOPHEN 5; 325 MG/1; MG/1
2 TABLET ORAL
Status: DISCONTINUED | OUTPATIENT
Start: 2022-09-11 | End: 2022-09-13 | Stop reason: HOSPADM

## 2022-09-11 RX ORDER — AMITRIPTYLINE HYDROCHLORIDE 50 MG/1
200 TABLET, FILM COATED ORAL
Status: DISCONTINUED | OUTPATIENT
Start: 2022-09-11 | End: 2022-09-13 | Stop reason: HOSPADM

## 2022-09-11 RX ORDER — DEXAMETHASONE SODIUM PHOSPHATE 4 MG/ML
INJECTION, SOLUTION INTRA-ARTICULAR; INTRALESIONAL; INTRAMUSCULAR; INTRAVENOUS; SOFT TISSUE AS NEEDED
Status: DISCONTINUED | OUTPATIENT
Start: 2022-09-11 | End: 2022-09-11 | Stop reason: HOSPADM

## 2022-09-11 RX ORDER — FENTANYL CITRATE 50 UG/ML
50 INJECTION, SOLUTION INTRAMUSCULAR; INTRAVENOUS
Status: DISCONTINUED | OUTPATIENT
Start: 2022-09-11 | End: 2022-09-11 | Stop reason: HOSPADM

## 2022-09-11 RX ORDER — CLONAZEPAM 0.5 MG/1
1 TABLET ORAL 2 TIMES DAILY
Status: DISCONTINUED | OUTPATIENT
Start: 2022-09-11 | End: 2022-09-13 | Stop reason: HOSPADM

## 2022-09-11 RX ORDER — OXYCODONE AND ACETAMINOPHEN 10; 325 MG/1; MG/1
2 TABLET ORAL
Status: DISCONTINUED | OUTPATIENT
Start: 2022-09-11 | End: 2022-09-13 | Stop reason: HOSPADM

## 2022-09-11 RX ORDER — NALOXONE HYDROCHLORIDE 0.4 MG/ML
0.04 INJECTION, SOLUTION INTRAMUSCULAR; INTRAVENOUS; SUBCUTANEOUS
Status: DISCONTINUED | OUTPATIENT
Start: 2022-09-11 | End: 2022-09-11 | Stop reason: HOSPADM

## 2022-09-11 RX ORDER — ASPIRIN 81 MG/1
81 TABLET ORAL 2 TIMES DAILY
Status: DISCONTINUED | OUTPATIENT
Start: 2022-09-11 | End: 2022-09-13 | Stop reason: SDUPTHER

## 2022-09-11 RX ORDER — MIDAZOLAM HYDROCHLORIDE 1 MG/ML
INJECTION, SOLUTION INTRAMUSCULAR; INTRAVENOUS AS NEEDED
Status: DISCONTINUED | OUTPATIENT
Start: 2022-09-11 | End: 2022-09-11 | Stop reason: HOSPADM

## 2022-09-11 RX ORDER — ASPIRIN 81 MG/1
81 TABLET ORAL 2 TIMES DAILY
Status: DISCONTINUED | OUTPATIENT
Start: 2022-09-11 | End: 2022-09-11 | Stop reason: SDUPTHER

## 2022-09-11 RX ORDER — ACETAMINOPHEN 325 MG/1
650 TABLET ORAL EVERY 6 HOURS
Status: DISCONTINUED | OUTPATIENT
Start: 2022-09-12 | End: 2022-09-13 | Stop reason: HOSPADM

## 2022-09-11 RX ORDER — ASPIRIN 325 MG
325 TABLET, DELAYED RELEASE (ENTERIC COATED) ORAL 2 TIMES DAILY
Status: DISCONTINUED | OUTPATIENT
Start: 2022-09-11 | End: 2022-09-13 | Stop reason: HOSPADM

## 2022-09-11 RX ORDER — ONDANSETRON 2 MG/ML
4 INJECTION INTRAMUSCULAR; INTRAVENOUS
Status: DISCONTINUED | OUTPATIENT
Start: 2022-09-11 | End: 2022-09-13 | Stop reason: HOSPADM

## 2022-09-11 RX ORDER — FENTANYL CITRATE 50 UG/ML
INJECTION, SOLUTION INTRAMUSCULAR; INTRAVENOUS AS NEEDED
Status: DISCONTINUED | OUTPATIENT
Start: 2022-09-11 | End: 2022-09-11 | Stop reason: HOSPADM

## 2022-09-11 RX ORDER — SODIUM CHLORIDE, SODIUM LACTATE, POTASSIUM CHLORIDE, CALCIUM CHLORIDE 600; 310; 30; 20 MG/100ML; MG/100ML; MG/100ML; MG/100ML
INJECTION, SOLUTION INTRAVENOUS
Status: DISCONTINUED | OUTPATIENT
Start: 2022-09-11 | End: 2022-09-11 | Stop reason: HOSPADM

## 2022-09-11 RX ORDER — SODIUM CHLORIDE 0.9 % (FLUSH) 0.9 %
5-40 SYRINGE (ML) INJECTION AS NEEDED
Status: DISCONTINUED | OUTPATIENT
Start: 2022-09-11 | End: 2022-09-13 | Stop reason: HOSPADM

## 2022-09-11 RX ORDER — CEFAZOLIN SODIUM/WATER 2 G/20 ML
2 SYRINGE (ML) INTRAVENOUS EVERY 8 HOURS
Status: COMPLETED | OUTPATIENT
Start: 2022-09-12 | End: 2022-09-12

## 2022-09-11 RX ORDER — SODIUM CHLORIDE 0.9 % (FLUSH) 0.9 %
5-40 SYRINGE (ML) INJECTION EVERY 8 HOURS
Status: DISCONTINUED | OUTPATIENT
Start: 2022-09-11 | End: 2022-09-13 | Stop reason: HOSPADM

## 2022-09-11 RX ORDER — PROPOFOL 10 MG/ML
INJECTION, EMULSION INTRAVENOUS AS NEEDED
Status: DISCONTINUED | OUTPATIENT
Start: 2022-09-11 | End: 2022-09-11 | Stop reason: HOSPADM

## 2022-09-11 RX ORDER — CEFAZOLIN SODIUM/WATER 2 G/20 ML
2 SYRINGE (ML) INTRAVENOUS ONCE
Status: COMPLETED | OUTPATIENT
Start: 2022-09-11 | End: 2022-09-11

## 2022-09-11 RX ORDER — ROCURONIUM BROMIDE 10 MG/ML
INJECTION, SOLUTION INTRAVENOUS AS NEEDED
Status: DISCONTINUED | OUTPATIENT
Start: 2022-09-11 | End: 2022-09-11 | Stop reason: HOSPADM

## 2022-09-11 RX ORDER — SODIUM CHLORIDE, SODIUM LACTATE, POTASSIUM CHLORIDE, CALCIUM CHLORIDE 600; 310; 30; 20 MG/100ML; MG/100ML; MG/100ML; MG/100ML
50 INJECTION, SOLUTION INTRAVENOUS CONTINUOUS
Status: DISCONTINUED | OUTPATIENT
Start: 2022-09-11 | End: 2022-09-11 | Stop reason: HOSPADM

## 2022-09-11 RX ORDER — ALBUTEROL SULFATE 0.83 MG/ML
2.5 SOLUTION RESPIRATORY (INHALATION)
Status: DISCONTINUED | OUTPATIENT
Start: 2022-09-11 | End: 2022-09-11 | Stop reason: HOSPADM

## 2022-09-11 RX ORDER — ESCITALOPRAM OXALATE 10 MG/1
20 TABLET ORAL DAILY
Status: DISCONTINUED | OUTPATIENT
Start: 2022-09-12 | End: 2022-09-13 | Stop reason: HOSPADM

## 2022-09-11 RX ORDER — SODIUM CHLORIDE 0.9 % (FLUSH) 0.9 %
5-40 SYRINGE (ML) INJECTION EVERY 8 HOURS
Status: DISCONTINUED | OUTPATIENT
Start: 2022-09-11 | End: 2022-09-11 | Stop reason: HOSPADM

## 2022-09-11 RX ORDER — ARIPIPRAZOLE 10 MG/1
10 TABLET ORAL
Status: DISCONTINUED | OUTPATIENT
Start: 2022-09-11 | End: 2022-09-13 | Stop reason: HOSPADM

## 2022-09-11 RX ORDER — MELOXICAM 7.5 MG/1
15 TABLET ORAL DAILY
Status: DISCONTINUED | OUTPATIENT
Start: 2022-09-12 | End: 2022-09-13 | Stop reason: HOSPADM

## 2022-09-11 RX ORDER — NALOXONE HYDROCHLORIDE 0.4 MG/ML
0.4 INJECTION, SOLUTION INTRAMUSCULAR; INTRAVENOUS; SUBCUTANEOUS AS NEEDED
Status: DISCONTINUED | OUTPATIENT
Start: 2022-09-11 | End: 2022-09-13 | Stop reason: HOSPADM

## 2022-09-11 RX ORDER — KETAMINE HCL IN 0.9 % NACL 50 MG/5 ML
SYRINGE (ML) INTRAVENOUS AS NEEDED
Status: DISCONTINUED | OUTPATIENT
Start: 2022-09-11 | End: 2022-09-11 | Stop reason: HOSPADM

## 2022-09-11 RX ORDER — SODIUM CHLORIDE 0.9 % (FLUSH) 0.9 %
5-40 SYRINGE (ML) INJECTION AS NEEDED
Status: DISCONTINUED | OUTPATIENT
Start: 2022-09-11 | End: 2022-09-11 | Stop reason: HOSPADM

## 2022-09-11 RX ORDER — ONDANSETRON 2 MG/ML
INJECTION INTRAMUSCULAR; INTRAVENOUS AS NEEDED
Status: DISCONTINUED | OUTPATIENT
Start: 2022-09-11 | End: 2022-09-11 | Stop reason: HOSPADM

## 2022-09-11 RX ORDER — TRANEXAMIC ACID 10 MG/ML
1 INJECTION, SOLUTION INTRAVENOUS SEE ADMIN INSTRUCTIONS
Status: COMPLETED | OUTPATIENT
Start: 2022-09-11 | End: 2022-09-11

## 2022-09-11 RX ORDER — LIDOCAINE HYDROCHLORIDE 20 MG/ML
INJECTION, SOLUTION EPIDURAL; INFILTRATION; INTRACAUDAL; PERINEURAL AS NEEDED
Status: DISCONTINUED | OUTPATIENT
Start: 2022-09-11 | End: 2022-09-11 | Stop reason: HOSPADM

## 2022-09-11 RX ORDER — NALBUPHINE HYDROCHLORIDE 10 MG/ML
5 INJECTION, SOLUTION INTRAMUSCULAR; INTRAVENOUS; SUBCUTANEOUS
Status: DISCONTINUED | OUTPATIENT
Start: 2022-09-11 | End: 2022-09-11 | Stop reason: HOSPADM

## 2022-09-11 RX ORDER — ONDANSETRON 2 MG/ML
4 INJECTION INTRAMUSCULAR; INTRAVENOUS ONCE
Status: DISCONTINUED | OUTPATIENT
Start: 2022-09-11 | End: 2022-09-11 | Stop reason: HOSPADM

## 2022-09-11 RX ORDER — FLUVOXAMINE MALEATE 50 MG/1
50 TABLET ORAL
Status: DISCONTINUED | OUTPATIENT
Start: 2022-09-11 | End: 2022-09-13 | Stop reason: HOSPADM

## 2022-09-11 RX ORDER — SODIUM CHLORIDE, SODIUM LACTATE, POTASSIUM CHLORIDE, CALCIUM CHLORIDE 600; 310; 30; 20 MG/100ML; MG/100ML; MG/100ML; MG/100ML
300 INJECTION, SOLUTION INTRAVENOUS CONTINUOUS
Status: DISPENSED | OUTPATIENT
Start: 2022-09-11 | End: 2022-09-12

## 2022-09-11 RX ORDER — TRIAMTERENE/HYDROCHLOROTHIAZID 37.5-25 MG
1 TABLET ORAL DAILY
Status: DISCONTINUED | OUTPATIENT
Start: 2022-09-12 | End: 2022-09-13 | Stop reason: HOSPADM

## 2022-09-11 RX ORDER — DIPHENHYDRAMINE HYDROCHLORIDE 50 MG/ML
12.5 INJECTION, SOLUTION INTRAMUSCULAR; INTRAVENOUS
Status: DISCONTINUED | OUTPATIENT
Start: 2022-09-11 | End: 2022-09-11 | Stop reason: HOSPADM

## 2022-09-11 RX ORDER — HYDROMORPHONE HYDROCHLORIDE 1 MG/ML
0.5 INJECTION, SOLUTION INTRAMUSCULAR; INTRAVENOUS; SUBCUTANEOUS AS NEEDED
Status: DISCONTINUED | OUTPATIENT
Start: 2022-09-11 | End: 2022-09-11 | Stop reason: HOSPADM

## 2022-09-11 RX ADMIN — FENTANYL CITRATE 50 MCG: 50 INJECTION, SOLUTION INTRAMUSCULAR; INTRAVENOUS at 18:07

## 2022-09-11 RX ADMIN — TRANEXAMIC ACID 1 G: 10 INJECTION, SOLUTION INTRAVENOUS at 18:24

## 2022-09-11 RX ADMIN — Medication 2 G: at 18:21

## 2022-09-11 RX ADMIN — SUGAMMADEX 150 MG: 100 INJECTION, SOLUTION INTRAVENOUS at 19:02

## 2022-09-11 RX ADMIN — LIDOCAINE HYDROCHLORIDE 60 MG: 20 INJECTION, SOLUTION INTRAVENOUS at 18:07

## 2022-09-11 RX ADMIN — MIDAZOLAM 2 MG: 1 INJECTION INTRAMUSCULAR; INTRAVENOUS at 17:59

## 2022-09-11 RX ADMIN — ROCURONIUM BROMIDE 30 MG: 10 INJECTION, SOLUTION INTRAVENOUS at 18:07

## 2022-09-11 RX ADMIN — CLONAZEPAM 1 MG: 0.5 TABLET ORAL at 23:12

## 2022-09-11 RX ADMIN — TRANEXAMIC ACID 1 G: 10 INJECTION, SOLUTION INTRAVENOUS at 18:42

## 2022-09-11 RX ADMIN — ONDANSETRON HYDROCHLORIDE 4 MG: 2 INJECTION INTRAMUSCULAR; INTRAVENOUS at 18:42

## 2022-09-11 RX ADMIN — AMITRIPTYLINE HYDROCHLORIDE 200 MG: 50 TABLET, FILM COATED ORAL at 23:11

## 2022-09-11 RX ADMIN — OXYCODONE AND ACETAMINOPHEN 2 TABLET: 10; 325 TABLET ORAL at 20:52

## 2022-09-11 RX ADMIN — Medication 20 MG: at 19:14

## 2022-09-11 RX ADMIN — SODIUM CHLORIDE, PRESERVATIVE FREE 10 ML: 5 INJECTION INTRAVENOUS at 22:17

## 2022-09-11 RX ADMIN — SODIUM CHLORIDE, SODIUM LACTATE, POTASSIUM CHLORIDE, AND CALCIUM CHLORIDE 300 ML/HR: 600; 310; 30; 20 INJECTION, SOLUTION INTRAVENOUS at 22:15

## 2022-09-11 RX ADMIN — PROPOFOL 170 MG: 10 INJECTION, EMULSION INTRAVENOUS at 18:07

## 2022-09-11 RX ADMIN — SODIUM CHLORIDE, SODIUM LACTATE, POTASSIUM CHLORIDE, AND CALCIUM CHLORIDE: 600; 310; 30; 20 INJECTION, SOLUTION INTRAVENOUS at 17:59

## 2022-09-11 RX ADMIN — DEXAMETHASONE SODIUM PHOSPHATE 4 MG: 4 INJECTION, SOLUTION INTRAMUSCULAR; INTRAVENOUS at 18:42

## 2022-09-11 NOTE — PROGRESS NOTES
Oral and Written notification given to patient and/or caregiver informing them that they are currently an Outpatient receiving care in our facility. Outpatient services include Observation Services under [facility] requirements. Per ER note, \"68 y.o. female with PMHX of pretension, depression who presents to the emergency department via EMS C/O probable left hip dislocation. Patient states she was only bending over slightly to pull of her shorts when her left hip popped out of place just prior to arrival.  Patient underwent initial left total hip replacement by Dr. Bishop Rubin on 5/23/2022. Since then she has had 5 previous hip dislocations for which she came here and required close reduction in the OR, most recently 16 days ago, and had revision by Dr. Sonido Rajan at that time. \"    [phone] [id number] - Care manager met with patient and  at bedside; per patient, she has had 3 surgeries (initial 5/23, revision with larger ball head 5/25; two dislocations, another revision and now another dislocation). Patient is on schedule to go to surgery around 1700 - appears very discouraged about her progress and complications - mentioned that she usually takes 2 days to regain bladder continence to be able to urinate post receiving anesthesia. From previous surgeries, patient has rolling walker, access to shower chair and elevated toilet seat. On previous surgery patient had At [address] and she would like this again to return home. Care manager will continue to follow. Care Management Interventions  PCP Verified by CM:  Yes  Mode of Transport at Discharge: [address] (CM Consult): Discharge Planning, [address]: No  Reason Outside [place]: Patient already serviced by other home [id number]: Spouse/Significant Other  Confirm Follow Up Transport: Family  The Plan for Transition of Care is Related to the Following Treatment Goals : dislocated right hip  The Patient and/or Patient Representative was Provided with a Choice of Provider and Agrees with the Discharge Plan?: Yes  Name of the Patient Representative Who was Provided with a Choice of Provider and Agrees with the Discharge Plan: Delilah Clemens, patient  Freedom of Choice List was Provided with Basic Dialogue that Supports the Patient's Individualized Plan of Care/Goals, Treatment Preferences and Shares the Quality Data Associated with the Providers?: Yes  Discharge Location  Patient Expects to be Discharged to[de-identified] Home with home health

## 2022-09-11 NOTE — PROGRESS NOTES
Bedside shift change report given to Chanel Castaneda (oncoming nurse) by Rowan Pulido RN (offgoing nurse). Report included the following information SBAR, Kardex, ED Summary, Intake/Output, and MAR.

## 2022-09-11 NOTE — ED PROVIDER NOTES
EMERGENCY DEPARTMENT HISTORY AND PHYSICAL EXAM    Date: 9/11/2022  Patient Name: Kem Dozier    History of Presenting Illness     Chief Complaint   Patient presents with    Hip Pain         History Provided By: Patient    12:11 PM  Kem Dozier is a 67 y.o. female with PMHX of pretension, depression who presents to the emergency department via EMS C/O probable left hip dislocation. Patient states she was only bending over slightly to pull of her shorts when her left hip popped out of place just prior to arrival.  Patient underwent initial left total hip replacement by Dr. yRan Rodriguez on 5/23/2022. Since then she has had 5 previous hip dislocations for which she came here and required close reduction in the OR, most recently 16 days ago, and had revision by Dr. Sourav Cuevas at that time. EMS gave 75 MCG fentanyl prior to arrival she declines need for further pain medication at this time. Pt denies any other injuries, extremity numbness or weakness, and any other sxs or complaints. PCP: Guillermo Warren MD    Current Outpatient Medications   Medication Sig Dispense Refill    naloxone (NARCAN) 4 mg/actuation nasal spray Use 1 spray intranasally, then discard. Repeat with new spray every 2 min as needed for opioid overdose symptoms, alternating nostrils. 1 Each 0    aspirin delayed-release 81 mg tablet Take 1 Tablet by mouth two (2) times a day for 30 days. 60 Tablet 0    aspirin delayed-release 81 mg tablet Take 1 Tablet by mouth two (2) times a day. 60 Tablet 0    flavoxate HCl (FLAVOXATE PO) Take  by mouth. (Patient not taking: Reported on 8/26/2022)      fluvoxaMINE (LUVOX) 50 mg tablet Take 50 mg by mouth nightly. Indications: depression      linaCLOtide (LINZESS) 145 mcg cap capsule Take 145 mg by mouth. Indications: IBS      clonazePAM (KlonoPIN) 1 mg tablet Take 1 mg by mouth two (2) times a day. triamterene-hydroCHLOROthiazide (DYAZIDE) 37.5-25 mg per capsule Take 1 Capsule by mouth daily. escitalopram (LEXAPRO) 20 mg tablet Take 20 mg by mouth daily. In am  Indications: anxiousness associated with depression      ARIPiprazole (ABILIFY) 2 mg tablet Take 10 mg by mouth nightly. Indications: depression      amitriptyline (ELAVIL) 100 mg tablet Take 200 mg by mouth nightly. Indications: anxious, helps with sleep         Past History     Past Medical History:  Past Medical History:   Diagnosis Date    Arthritis     osteo    Difficult intubation     esophagus needs to be stretch every 2 years, had done 03/2022    GERD (gastroesophageal reflux disease)     relux, and hiatal hernia    Hypertension     Osteoarthritis of right knee 2/1/2013    Other ill-defined conditions(799.89)     hiatal hernia    Psychiatric disorder     depression    S/P total knee arthroplasty 02/04/2013       Past Surgical History:  Past Surgical History:   Procedure Laterality Date    HX CATARACT REMOVAL      left eye    HX CHOLECYSTECTOMY      laparoscopic    HX COLONOSCOPY      had 2 done, f/u in 10 years    HX HEENT  04/2022    repair detached retena    HX HYSTERECTOMY      TVH    HX KNEE ARTHROSCOPY      right knee replacement    HX OOPHORECTOMY      BSO    HX ORTHOPAEDIC      manipulation of left shoulder under anesthesia       Family History:  No family history on file. Social History:  Social History     Tobacco Use    Smoking status: Never    Smokeless tobacco: Never   Substance Use Topics    Alcohol use: No    Drug use: Never       Allergies: Allergies   Allergen Reactions    Neosporin [Benzalkonium Chloride] Other (comments)     Soreness and redness    Other Medication Shortness of Breath and Swelling     Yellow Jacket bee stings         Review of Systems   Review of Systems   Constitutional: Negative. Musculoskeletal:  Positive for arthralgias and myalgias. Skin: Negative. Neurological:  Negative for weakness and numbness. All other systems reviewed and are negative.       Physical Exam     Vitals: 09/11/22 1214   BP: (!) 125/59   Pulse: 79   Resp: 16   Temp: 97.7 °F (36.5 °C)   SpO2: 99%   Weight: 102.1 kg (225 lb)   Height: 5' 10\" (1.778 m)     Physical Exam  Vital signs and nursing notes reviewed. CONSTITUTIONAL: Alert. Well-appearing; well-nourished; in no apparent distress. CV: Normal S1, S2; no murmurs, rubs, or gallops. No chest wall tenderness. RESPIRATORY: Normal chest excursion with respiration; breath sounds clear and equal bilaterally; no wheezes, rhonchi, or rales. GI: Normal bowel sounds; non-distended; non-tender; no guarding or rigidity; no palpable organomegaly. No CVA tenderness. BACK:  No evidence of trauma or deformity. Non-tender to palpation. FROM without difficulty. Negative straight leg raise bilaterally. EXT: LLE: Left leg is shortened and internally rotated. Sensation intact. 2+ DP pulse. Left anterior hip surgical wound appears to be healing well with honeycomb dressing over place, no dehiscence drainage erythema or tenderness here. SKIN: Normal for age and race; warm; dry; good turgor; no apparent lesions or exudate. NEURO: A & O x3. PSYCH:  Mood and affect appropriate. Diagnostic Study Results     Labs -   No results found for this or any previous visit (from the past 12 hour(s)). Radiologic Studies -   X-ray of the left hip shows dislocation of the left prosthetic hip, no obvious fracture noted. XR HIP LT W OR WO PELV 2-3 VWS    (Results Pending)     CT Results  (Last 48 hours)      None          CXR Results  (Last 48 hours)      None            Medications given in the ED-  Medications - No data to display      Medical Decision Making   I am the first provider for this patient. I reviewed the vital signs, available nursing notes, past medical history, past surgical history, family history and social history. Vital Signs-Reviewed the patient's vital signs.     Records Reviewed: Nursing Notes and Old Medical Records      Procedures:  Procedures    ED Course:  12:11 PM   Initial assessment performed. The patients presenting problems have been discussed, and they are in agreement with the care plan formulated and outlined with them. I have encouraged them to ask questions as they arise throughout their visit. Patient declines anything more for pain at this time. 12:40 PM consult note  Case discussed with orthopedist on-call Dr. Aime Lane, who will admit to his service with plan for revision/reduction of dislocation either tonight or tomorrow. Patient last ate at 6 AM this morning. Diagnosis and Disposition       ADMISSION NOTE:  12:44 PM  Patient is being admitted to the hospital by Dr. Aime Lane. The results of their tests and reasons for their admission have been discussed with them and/or available family. They convey agreement and understanding for the need to be admitted and for their admission diagnosis. CONDITIONS ON ADMISSION:  Deep Vein Thrombosis is not present at the time of admission. Thrombosis is not present at the time of admission. Urinary Tract Infection is not present at the time of admission. Pneumonia is not present at the time of admission. MRSA is not present at the time of admission. Wound infection is not present at the time of admission. Pressure Ulcer is not present at the time of admission. CLINICAL IMPRESSION:    1. Hip dislocation, left, initial encounter ([facility] [address]        PLAN:  1. Admit        _______________________________      Please note that this dictation was completed with [employer], the computer voice recognition software. Quite often unanticipated grammatical, syntax, homophones, and other interpretive errors are inadvertently transcribed by the computer software. Please disregard these errors. Please excuse any errors that have escaped final proofreading.

## 2022-09-11 NOTE — BRIEF OP NOTE
Brief Postoperative Note    Patient: Kiki Arnold  YOB: 1950  MRN: [id number]    Date of Procedure: 9/11/2022     Pre-Op Diagnosis: dislocated left total hip    Post-Op Diagnosis: Same as preoperative diagnosis. Procedure(s):  HIP ARTHROPLASTY TOTAL ANTERIOR APPROACH REVISION, LEFT WITH C-ARM    Surgeon(s):  MD Shantanu Chavez MD    Surgical Assistant: Physician Assistant: MAIA Zuñiga  Surg Asst-1: Liane Prince    Anesthesia: General     Estimated Blood Loss (mL): less than 50     Complications: None    Specimens: * No specimens in log *     Implants:   Implant Name Type Inv. Item Serial No.  Lot No. LRB No. Used Action   HEAD FEM [id number] +8MM OFFSET HIP CO CHROM POLYETH TAPR LO - [id number]  HEAD FEM [id number] +8MM OFFSET HIP CO CHROM POLYETH TAPR   [employer] at work_ [id number] Left 1 Implanted   LINER ACETABLUAR SZ F DIA48. 5MM HD OD42MM [id number] THK6.5MM - [id number]  LINER ACETABLUAR SZ F DIA48. 5MM HD OD42MM [id number] THK6.5MM  [employer]WD [id number] Left 1 Implanted       Drains: * No LDAs found *    Findings: dislocated left total hip    Electronically Signed by Yanique Mistry MD on 9/11/2022 at 7:23 PM

## 2022-09-11 NOTE — ANESTHESIA POSTPROCEDURE EVALUATION
Post-Anesthesia Evaluation and Assessment    Cardiovascular Function/Vital Signs  Visit Vitals  BP (!) 140/51   Pulse 87   Temp 36.6 °C (97.8 °F)   Resp 20   Ht 5' 10\" (1.778 m)   Wt 102.1 kg (225 lb)   SpO2 100%   Breastfeeding No   BMI 32.28 kg/m²       Patient is status post Procedure(s):  HIP ARTHROPLASTY TOTAL ANTERIOR APPROACH REVISION, LEFT WITH C-ARM. Nausea/Vomiting: Controlled. Postoperative hydration reviewed and adequate. Pain:  Pain Scale 1: Numeric (0 - 10) (09/11/22 1932)  Pain Intensity 1: 0 (09/11/22 1932)   Managed. Neurological Status:   Neuro (WDL): Within Defined Limits (09/11/22 1932)   At baseline. Mental Status and Level of Consciousness: Arousable. Pulmonary Status:   O2 Device: Nasal cannula (09/11/22 1932)   Adequate oxygenation and airway patent. Complications related to anesthesia: None    Post-anesthesia assessment completed. No concerns. Patient has met all discharge requirements.     Signed By: Katiana Marlow CRNA    September 11, 2022

## 2022-09-11 NOTE — H&P
Orthopaedic PRE-OP Admission History and Physical    Patient: Nikita Andrew MRN: [id number]  SSN: [id number]    YOB: 1950  Age: 67 y.o. Sex: female            Subjective:   Patient is a 67 y.o.  female who presents with history of left hip acetabular revision 2 weeks ago due to multiple dislocations. She presented to the ED today with a recurrent dislocation. This is her first dislocation since her last revision by Dr. Mitchell Aj. Patient Active Problem List    Diagnosis Date Noted    Primary hypertension 08/26/2022    Depression 08/26/2022    GERD (gastroesophageal reflux disease) 08/26/2022    [facility] 07/11/2022    Status post revision of total hip replacement 05/25/2022    Status post left hip replacement 05/23/2022    S/P total knee arthroplasty 02/04/2013     Past Medical History:   Diagnosis Date    Arthritis     osteo    Difficult intubation     esophagus needs to be stretch every 2 years, had done 03/2022    GERD (gastroesophageal reflux disease)     relux, and hiatal hernia    Hypertension     Osteoarthritis of right knee 2/1/2013    Other ill-defined conditions(799.89)     hiatal hernia    Psychiatric disorder     depression    S/P total knee arthroplasty 02/04/2013      Past Surgical History:   Procedure Laterality Date    HX CATARACT REMOVAL      left eye    HX CHOLECYSTECTOMY      laparoscopic    HX COLONOSCOPY      had 2 done, f/u in 10 years    HX HEENT  04/2022    repair detached retena    HX HYSTERECTOMY      TVH    HX KNEE ARTHROSCOPY      right knee replacement    HX OOPHORECTOMY      BSO    HX ORTHOPAEDIC      manipulation of left shoulder under anesthesia      Prior to Admission medications    Medication Sig Start Date End Date Taking? Authorizing Provider   naloxone [facility]) 4 mg/actuation nasal spray Use 1 spray intranasally, then discard.  Repeat with new spray every 2 min as needed for opioid overdose symptoms, alternating nostrils. 8/29/22   Crockett, Lovely [place] A, PA   aspirin delayed-release 81 mg tablet Take 1 Tablet by mouth two (2) times a day for 30 days. 8/28/22 9/27/22  Shena Zendejas MD   aspirin delayed-release 81 mg tablet Take 1 Tablet by mouth two (2) times a day. 5/23/22   Vero Nurse, HEATHER   fluvoxaMINE (LUVOX) 50 mg tablet Take 50 mg by mouth nightly. Indications: depression    Provider, Historical   linaCLOtide (LINZESS) 145 mcg cap capsule Take 145 mg by mouth. Indications: IBS    Provider, Historical   clonazePAM (KlonoPIN) 1 mg tablet Take 1 mg by mouth two (2) times a day. Provider, Historical   triamterene-hydroCHLOROthiazide (DYAZIDE) 37.5-25 mg per capsule Take 1 Capsule by mouth daily. Provider, Historical   escitalopram (LEXAPRO) 20 mg tablet Take 20 mg by mouth daily. In am  Indications: anxiousness associated with depression    Provider, Historical   ARIPiprazole (ABILIFY) 2 mg tablet Take 10 mg by mouth nightly. Indications: depression    Provider, Historical   amitriptyline (ELAVIL) 100 mg tablet Take 200 mg by mouth nightly. Indications: anxious, helps with sleep    Provider, Historical     No current facility-administered medications for this encounter. Allergies   Allergen Reactions    Neosporin [Benzalkonium Chloride] Other (comments)     Soreness and redness    Other Medication Shortness of Breath and Swelling     Yellow Jacket bee stings      Social History     Tobacco Use    Smoking status: Never    Smokeless tobacco: Never   Substance Use Topics    Alcohol use: No      No family history on file. Review of Systems  A comprehensive review of systems was negative except for that written in the HPI.         Objective:   Patient Vitals for the past 8 hrs:   BP Temp Pulse Resp SpO2 Height Weight   09/11/22 1517 112/72 97.9 °F (36.6 °C) 72 16 100 % -- --   09/11/22 1353 117/65 98.3 °F (36.8 °C) 72 16 99 % -- --   09/11/22 1226 123/68 -- -- -- 95 % -- --   09/11/22 1214 (!) 125/59 97.7 °F (36.5 °C) 79 16 99 % 5' 10\" (1.778 m) 102.1 kg (225 lb)     Temp (24hrs), Av °F (36.7 °C), Min:97.7 °F (36.5 °C), Max:98.3 °F (36.8 °C)      Gen: Well-developed,  in no acute distress   HEENT: Pink conjunctivae, PERRL, hearing intact to voice, moist mucous membranes   Neck: Supple, without masses, thyroid non-tender   Resp: No accessory muscle use, clear breath sounds without wheezes rales or rhonchi   Card: No murmurs, normal S1, S2 without thrills, bruits or peripheral edema   Abd: Soft, non-tender, non-distended, normoactive bowel sounds are present, no palpable organomegaly and no detectable hernias   Lymph: No cervical or inguinal adenopathy   Musc: able to wiggle toes, sensation intact, left hip painful   Skin: No skin breakdown noted. Skin warm, pink, dry  Neuro: Cranial nerves are grossly intact, no focal motor weakness, follows commands appropriately   Psych: Good insight, oriented to person, place and time, alert      Labs:   Recent Results (from the past 24 hour(s))   CBC WITH AUTOMATED DIFF    Collection Time: 22 12:15 PM   Result Value Ref Range    WBC 6.3 4.6 - 13.2 K/uL    RBC 2.94 (L) 4.20 - 5.30 M/uL    HGB 8.3 (L) 12.0 - 16.0 g/dL    HCT 26.3 (L) 35.0 - 45.0 %    MCV 89.5 78.0 - 100.0 FL    MCH 28.2 24.0 - 34.0 PG    MCHC 31.6 31.0 - 37.0 g/dL    RDW 13.6 11.6 - 14.5 %    PLATELET 405 467 - 249 K/uL    MPV 8.9 (L) 9.2 - 11.8 FL    NRBC 0.0 0  WBC    ABSOLUTE NRBC 0.00 0.00 - 0.01 K/uL    NEUTROPHILS 66 40 - 73 %    LYMPHOCYTES 22 21 - 52 %    MONOCYTES 7 3 - 10 %    EOSINOPHILS 5 0 - 5 %    BASOPHILS 0 0 - 2 %    IMMATURE GRANULOCYTES 0 0.0 - 0.5 %    ABS. NEUTROPHILS 4.2 1.8 - 8.0 K/UL    ABS. LYMPHOCYTES 1.4 0.9 - 3.6 K/UL    ABS. MONOCYTES 0.4 0.05 - 1.2 K/UL    ABS. EOSINOPHILS 0.3 0.0 - 0.4 K/UL    ABS. BASOPHILS 0.0 0.0 - 0.1 K/UL    ABS. IMM.  GRANS. 0.0 0.00 - 0.04 K/UL    DF AUTOMATED     METABOLIC PANEL, BASIC    Collection Time: 22 12:15 PM   Result Value Ref Range    Sodium 135 (L) 136 - 145 mmol/L    Potassium 3.6 3.5 - 5.5 mmol/L    Chloride 99 (L) 100 - 111 mmol/L    CO2 29 21 - 32 mmol/L    Anion gap 7 3.0 - 18 mmol/L    Glucose 97 74 - 99 mg/dL    BUN 15 7.0 - 18 MG/DL    Creatinine 1.11 0.6 - 1.3 MG/DL    BUN/Creatinine ratio 14 12 - 20      GFR est AA 58 (L) >60 ml/min/1.73m2    GFR est non-AA 48 (L) >60 ml/min/1.73m2    Calcium 8.6 8.5 - 10.1 MG/DL       Assessment:     Patient Active Problem List    Diagnosis Date Noted    Primary hypertension 08/26/2022    Depression 08/26/2022    GERD (gastroesophageal reflux disease) 08/26/2022    Hip dislocation, left, initial encounter ([facility] [address] 07/11/2022    Status post revision of total hip replacement 05/25/2022    Status post left hip replacement 05/23/2022    S/P total knee arthroplasty 02/04/2013         Plan:   Risks and benefits discussed at length. She understands and wishes to proceed with attempted closed reduction vs open reduction vs revision hip arthroplasty. Proceed with surgical intervention without contraindications.       Scott Jose MD

## 2022-09-11 NOTE — ED TRIAGE NOTES
Pt arrives via ems stretcher with c\o recurrent LEFT hip dislocation. Pt sts this is her sixth time being dislocated. Pt left leg shortened. Good pedal pulses.  Pt recvd 75 mcg of fentanyl en route via IVP

## 2022-09-11 NOTE — ROUTINE PROCESS
TRANSFER - OUT REPORT:    Verbal report given to GABRIELA Christie(name) on Ambar Snowlaney  being transferred to Medical(unit) for routine progression of care       Report consisted of patients Situation, Background, Assessment and   Recommendations(SBAR). Information from the following report(s) SBAR, MAR, and Recent Results was reviewed with the receiving nurse. Lines:   Peripheral IV 09/11/22 Anterior;Right Forearm (Active)        Opportunity for questions and clarification was provided.       Patient transported with:   [employer]

## 2022-09-11 NOTE — ANESTHESIA PREPROCEDURE EVALUATION
Relevant Problems   NEUROLOGY   (+) Depression      CARDIOVASCULAR   (+) Primary hypertension      GASTROINTESTINAL   (+) GERD (gastroesophageal reflux disease)       Anesthetic History     Other anesthesia complications   Pertinent negatives: No increased risk of difficult airway  Comments: Pt reports hallucinations under anesthesia during one procedure     Review of Systems / Medical History  Patient summary reviewed, nursing notes reviewed and pertinent labs reviewed    Pulmonary  Within defined limits            Pertinent negatives: No COPD, asthma and sleep apnea     Neuro/Psych         Psychiatric history (Depression)  Pertinent negatives: No seizures and CVA   Cardiovascular    Hypertension          Hyperlipidemia  Pertinent negatives: No past MI and CHF  Exercise tolerance: >4 METS     GI/Hepatic/Renal     GERD        Pertinent negatives: No liver disease and renal disease   Endo/Other        Arthritis  Pertinent negatives: No diabetes   Other Findings              Physical Exam    Airway  Mallampati: II  TM Distance: 4 - 6 cm  Neck ROM: normal range of motion   Mouth opening: Normal     Cardiovascular               Dental    Dentition: Edentulous, Full lower dentures and Full upper dentures     Pulmonary                 Abdominal  GI exam deferred       Other Findings            Anesthetic Plan    ASA: 2, emergent  Anesthesia type: general          Induction: Intravenous  Anesthetic plan and risks discussed with: Patient and Spouse

## 2022-09-11 NOTE — PERIOP NOTES
TRANSFER - IN REPORT:    Verbal report received from CRNA and Or nurse(name) on Ambar Snowball  being received from OR(unit) for routine post - op      Report consisted of patients Situation, Background, Assessment and   Recommendations(SBAR). Information from the following report(s) SBAR, Kardex, OR Summary, Procedure Summary, Intake/Output, and MAR was reviewed with the receiving nurse. Opportunity for questions and clarification was provided. Assessment completed upon patients arrival to unit and care assumed.

## 2022-09-12 PROCEDURE — 74011250637 HC RX REV CODE- 250/637: Performed by: ORTHOPAEDIC SURGERY

## 2022-09-12 PROCEDURE — 77010033678 HC OXYGEN DAILY

## 2022-09-12 PROCEDURE — 97161 PT EVAL LOW COMPLEX 20 MIN: CPT

## 2022-09-12 PROCEDURE — 97530 THERAPEUTIC ACTIVITIES: CPT

## 2022-09-12 PROCEDURE — 74011000250 HC RX REV CODE- 250: Performed by: ORTHOPAEDIC SURGERY

## 2022-09-12 PROCEDURE — G0378 HOSPITAL OBSERVATION PER HR: HCPCS

## 2022-09-12 PROCEDURE — 51798 US URINE CAPACITY MEASURE: CPT

## 2022-09-12 PROCEDURE — 97116 GAIT TRAINING THERAPY: CPT

## 2022-09-12 PROCEDURE — 74011250636 HC RX REV CODE- 250/636: Performed by: ORTHOPAEDIC SURGERY

## 2022-09-12 RX ORDER — OXYCODONE AND ACETAMINOPHEN 5; 325 MG/1; MG/1
2 TABLET ORAL
Qty: 12 TABLET | Refills: 0 | Status: SHIPPED | OUTPATIENT
Start: 2022-09-12 | End: 2022-09-26

## 2022-09-12 RX ORDER — TAMSULOSIN HYDROCHLORIDE 0.4 MG/1
0.4 CAPSULE ORAL DAILY
Status: DISCONTINUED | OUTPATIENT
Start: 2022-09-12 | End: 2022-09-13 | Stop reason: HOSPADM

## 2022-09-12 RX ADMIN — CLONAZEPAM 1 MG: 0.5 TABLET ORAL at 09:23

## 2022-09-12 RX ADMIN — SODIUM CHLORIDE, PRESERVATIVE FREE 10 ML: 5 INJECTION INTRAVENOUS at 07:03

## 2022-09-12 RX ADMIN — TRIAMTERENE AND HYDROCHLOROTHIAZIDE 1 TABLET: 37.5; 25 TABLET ORAL at 09:24

## 2022-09-12 RX ADMIN — FLUVOXAMINE MALEATE 50 MG: 50 TABLET, COATED ORAL at 20:36

## 2022-09-12 RX ADMIN — CEFAZOLIN 2 G: 10 INJECTION, POWDER, FOR SOLUTION INTRAVENOUS at 09:24

## 2022-09-12 RX ADMIN — TAMSULOSIN HYDROCHLORIDE 0.4 MG: 0.4 CAPSULE ORAL at 09:23

## 2022-09-12 RX ADMIN — ASPIRIN 325 MG: 325 TABLET, COATED ORAL at 09:23

## 2022-09-12 RX ADMIN — ACETAMINOPHEN 650 MG: 325 TABLET ORAL at 23:55

## 2022-09-12 RX ADMIN — SODIUM CHLORIDE, PRESERVATIVE FREE 10 ML: 5 INJECTION INTRAVENOUS at 20:37

## 2022-09-12 RX ADMIN — ESCITALOPRAM OXALATE 20 MG: 10 TABLET ORAL at 09:23

## 2022-09-12 RX ADMIN — CLONAZEPAM 1 MG: 0.5 TABLET ORAL at 20:34

## 2022-09-12 RX ADMIN — AMITRIPTYLINE HYDROCHLORIDE 200 MG: 50 TABLET, FILM COATED ORAL at 20:35

## 2022-09-12 RX ADMIN — ARIPIPRAZOLE 10 MG: 10 TABLET ORAL at 20:35

## 2022-09-12 RX ADMIN — OXYCODONE AND ACETAMINOPHEN 2 TABLET: 5; 325 TABLET ORAL at 18:13

## 2022-09-12 RX ADMIN — SODIUM CHLORIDE, PRESERVATIVE FREE 10 ML: 5 INJECTION INTRAVENOUS at 14:00

## 2022-09-12 RX ADMIN — OXYCODONE AND ACETAMINOPHEN 1 TABLET: 10; 325 TABLET ORAL at 09:23

## 2022-09-12 RX ADMIN — MELOXICAM 15 MG: 7.5 TABLET ORAL at 09:23

## 2022-09-12 RX ADMIN — CEFAZOLIN 2 G: 10 INJECTION, POWDER, FOR SOLUTION INTRAVENOUS at 02:30

## 2022-09-12 NOTE — PROGRESS NOTES
Telephone orders with read back received by Dr. Marcello Le for perioperative genao catheter insertion. Pt tolerated well.  900 ml urine

## 2022-09-12 NOTE — PROGRESS NOTES
Problem: Mobility Impaired (Adult and Pediatric)  Goal: *Acute Goals and Plan of Care (Insert Text)  Description: In 1-7 days pt will be able to perform:  ST. Adhere to hip precautions to avoid future dislocations and adhere to safety. 2.  Supine to sit to supine S with HR for meals. 3.  Sit to stand to sit S with RW in prep for ambulation. LT.  Gait:  Ambulate >150ft S with RW, WBAT, for home/community mobility. 2.  Stair Negotiation:  Ascend/descend >5 steps CGA with HR for home entry. 3.  Activity tolerance: Tolerate up in chair 1-2 hours for ADL's.  4.  Patient/Family Education:  Patient/family to be independent with HEP for follow-up care and safe discharge. Note: []  Patient has met MD brenda eden for d/c home   []  Recommend HH with 24 hour adult care   [x]  Benefit from additional acute PT session to address:  transfer, gait and stair training    PHYSICAL THERAPY EVALUATION    Patient: Ambar Borja (81 y.o. female)  Date: 2022  Primary Diagnosis: Hip dislocation, left, initial encounter ([facility] [address] [S73.005A]  Procedure(s) (LRB):  HIP ARTHROPLASTY TOTAL ANTERIOR APPROACH REVISION, LEFT WITH C-ARM (Left) 1 Day Post-Op   Precautions:   Fall, WBAT, Total hip (dislocation)    PLOF: Ambulation w/ RW; multiple dislocations in past    ASSESSMENT :  Based on the objective data described below, the patient presents with decreased mobility in regards to bed mobility, transfers, gt quality and tolerance, balance, stair negotiation and safety due to L LAURA surgery. Decreased AROM of L LE, dec strength of L LE, pain in L LE also impacting pt functional mobility. Pt rating pain on numerical pain scale pre/post and during session 6/10. Pt and  ed regarding mobility safety, WB, HEP, ice application/use, elevation, environmental safety and need to use call bell for activity.   Stressed importance of body alignment, safe mobility to prevent possible dislocation in future w/ pt requiring additional time and frequent vc to adhere to safety. Pt able to perform supine<>sit w/ min A/CGA additional time and sit<>stand w/ CGA/min A. Safety vc required throughout session to reinforce safety. Pt able to participate in gt training using RW, GB, WBAT and CGA w/ antalgic gt pattern. Pt c/o feeling faint and required return to bed. BP 95/57. Answered questions by pt and  in regards to PT and mobility. Pt left supine in bed (/59) w/ all needs within reach, abductor wedge pillow in place, B SCD reapplied and ice pack to L hip. Nurse Charly aware of session and outcomes. Recommend HHPT with responsible adult care at least 24 hours upon hospital d/c. Patient will benefit from skilled intervention to address the above impairments. Patient's rehabilitation potential is considered to be Fair  Factors which may influence rehabilitation potential include:   []         None noted  []         Mental ability/status  [x]         Medical condition  []         Home/family situation and support systems  [x]         Safety awareness  [x]         Pain tolerance/management  []         Other:      PLAN :  Recommendations and Planned Interventions:   [x]           Bed Mobility Training             []    Neuromuscular Re-Education  [x]           Transfer Training                   []    Orthotic/Prosthetic Training  [x]           Gait Training                          [x]    Modalities  [x]           Therapeutic Exercises           [x]    Edema Management/Control  [x]           Therapeutic Activities            [x]    Family Training/Education  [x]           Patient Education  []           Other (comment):    Frequency/Duration: Patient will be followed by physical therapy 1-2 times per day/4-7 days per week to address goals.   Discharge Recommendations: Home Health  Further Equipment Recommendations for Discharge: N/A    [facility]: 15/24    This [facility] score should be considered in conjunction with interdisciplinary team recommendations to determine the most appropriate discharge setting. Patient's social support, diagnosis, medical stability, and prior level of function should also be taken into consideration. SUBJECTIVE:   Patient stated I can feel what it feels like when it pops out.     OBJECTIVE DATA SUMMARY:     Past Medical History:   Diagnosis Date    Arthritis     osteo    Difficult intubation     esophagus needs to be stretch every 2 years, had done 03/2022    GERD (gastroesophageal reflux disease)     relux, and hiatal hernia    Hypertension     Osteoarthritis of right knee 2/1/2013    Other ill-defined conditions(799.89)     hiatal hernia    Psychiatric disorder     depression    S/P total knee arthroplasty 02/04/2013     Past Surgical History:   Procedure Laterality Date    HX CATARACT REMOVAL      left eye    HX CHOLECYSTECTOMY      laparoscopic    HX COLONOSCOPY      had 2 done, f/u in 10 years    HX HEENT  04/2022    repair detached retena    HX HYSTERECTOMY      TVH    HX KNEE ARTHROSCOPY      right knee replacement    HX OOPHORECTOMY      BSO    HX ORTHOPAEDIC      manipulation of left shoulder under anesthesia     Barriers to Learning/Limitations: yes;  anesthesia, physical  Compensate with: Visual Cues, Verbal Cues, and Tactile Cues  Home Situation:  Home Situation  Home Environment: Private residence  # Steps to Enter: 3  Rails to Enter: Yes  Hand Rails : Bilateral (wide)  One/Two Story Residence: One story  Living Alone: No  Support Systems: Spouse/Significant Other  Patient Expects to be Discharged to[de-identified] Home with home health  Current DME Used/Available at Home: Walker, rolling  Critical Behavior:  Neurologic State: Alert  Orientation Level: Oriented X4  Cognition: Appropriate for age attention/concentration; Follows commands  Safety/Judgement: Awareness of environment;Decreased insight into deficits  Psychosocial  Patient Behaviors: Calm; Cooperative  Family  Behaviors: Calm;Supportive  Skin Condition/Temp: Dry;Warm  Family  Behaviors: Calm;Supportive  Skin Integrity: Incision (comment) (L hip)  Skin Integumentary  Skin Color: Appropriate for ethnicity  Skin Condition/Temp: Dry;Warm  Skin Integrity: Incision (comment) (L hip)  Strength:    Strength: Generally decreased, functional  Tone & Sensation:   Tone: Normal  Sensation: Intact  Range Of Motion:  AROM: Generally decreased, functional  PROM: Generally decreased, functional  Posture:  Functional Mobility:  Bed Mobility:  Supine to Sit: Contact guard assistance; Additional time (vc)  Sit to Supine: Contact guard assistance;Minimum assistance; Additional time (vc)  Scooting: Contact guard assistance; Additional time (vc)  Transfers:  Sit to Stand: Contact guard assistance;Minimum assistance; Additional time (vc)  Stand to Sit: Contact guard assistance; Additional time (vc)  Balance:   Sitting: Intact  Standing: Intact; With support  Ambulation/Gait Training:  Distance (ft): 10 Feet (ft)  Assistive Device: Walker, rolling;Gait belt  Ambulation - Level of Assistance: Contact guard assistance; Additional time (vc)  Gait Abnormalities: Antalgic;Decreased step clearance; Step to gait  Left Side Weight Bearing: As tolerated  Base of Support: Shift to right  Stance: Left decreased  Speed/Faye: Slow  Step Length: Left shortened;Right shortened  Swing Pattern: Left asymmetrical;Right asymmetrical  Interventions: Safety awareness training; Tactile cues; Verbal cues; Visual/Demos  Therapeutic Exercises:   Encouraged HEP  Pain:  Pain level pre-treatment: 6/10   Pain level post-treatment: 6/10   Pain Intervention(s) : Medication (see MAR); Rest, Ice, Repositioning  Response to intervention: Nurse notified, See doc flow    Activity Tolerance:   Fair -  Please refer to the flowsheet for vital signs taken during this treatment.   After treatment:   []         Patient left in no apparent distress sitting up in chair  [x]         Patient left in no apparent distress in bed  [x]         Call bell left within reach  [x]         Nursing notified  [x]         Caregiver present  []         Bed alarm activated  [x]         SCDs applied    COMMUNICATION/EDUCATION:   [x]         Role of Physical Therapy in the acute care setting. [x]         Fall prevention education was provided and the patient/caregiver indicated understanding. [x]         Patient/family have participated as able in goal setting and plan of care. [x]         Patient/family agree to work toward stated goals and plan of care. []         Patient understands intent and goals of therapy, but is neutral about his/her participation. []         Patient is unable to participate in goal setting/plan of care: ongoing with therapy staff.  []         Other: Thank you for this referral.  Elena Husain, PT   Time Calculation: 35 mins      Eval Complexity: History: HIGH Complexity :3+ comorbidities / personal factors will impact the outcome/ POC Exam:MEDIUM Complexity : 3 Standardized tests and measures addressing body structure, function, activity limitation and / or participation in recreation  Presentation: LOW Complexity : Stable, uncomplicated  Clinical Decision Making:Low Complexity    Overall Complexity:LOW     [address] [facility] [address] [address] AM-PAC® Basic Mobility Inpatient Short Form (6-Clicks) Version 2    How much HELP from another person does the patient currently need    (If the patient hasn't done an activity recently, how much help from another person do you think he/she would need if he/she tried?)   Total (Total A or Dep)   A Lot  (Mod to Max A)   A Little (Sup or Min A)   None (Mod I to I)   Turning from your back to your side while in a flat bed without using bedrails? [] 1 [x] 2 [] 3 [] 4   2. Moving from lying on your back to sitting on the side of a flat bed without using bedrails? [] 1 [] 2 [x] 3 [] 4   3. Moving to and from a bed to a chair (including a wheelchair)? [] 1 [] 2 [x] 3 [] 4   4. Standing up from a chair using your arms (e.g., wheelchair, or bedside chair)? [] 1 [x] 2 [] 3 [] 4   5. Walking in hospital room? [] 1 [] 2 [x] 3 [] 4   6. Climbing 3-5 steps with a railing?+   [] 1 [x] 2 [] 3 [] 4   +If stair climbing cannot be assessed, skip item #6. Sum responses from items 1-5. Based on an AM-PAC score of 15/24 and their current functional mobility deficits, it is recommended that the patient have 3-5 sessions per week of Physical Therapy at d/c to increase the patient's independence.

## 2022-09-12 NOTE — PROGRESS NOTES
Problem: Mobility Impaired (Adult and Pediatric)  Goal: *Acute Goals and Plan of Care (Insert Text)  Description: In 1-7 days pt will be able to perform:  ST. Adhere to hip precautions to avoid future dislocations and adhere to safety. 2.  Supine to sit to supine S with HR for meals. 3.  Sit to stand to sit S with RW in prep for ambulation. LT.  Gait:  Ambulate >150ft S with RW, WBAT, for home/community mobility. 2.  Stair Negotiation:  Ascend/descend >5 steps CGA with HR for home entry. 3.  Activity tolerance: Tolerate up in chair 1-2 hours for ADL's.  4.  Patient/Family Education:  Patient/family to be independent with HEP for follow-up care and safe discharge. 2022 1532 by Tashi Daly PT  Outcome: Progressing Towards Goal  Note: [x]  Patient has met MD brenda eden for d/c home   [x]  Recommend HH with 24 hour adult care   []  Benefit from additional acute PT session to address:      PHYSICAL THERAPY TREATMENT    Patient: Rj Green (27 y.o. female)  Date: 2022  Diagnosis: Hip dislocation, left, [facility] [S73.005A] <principal problem not specified>  Procedure(s) (LRB):  HIP ARTHROPLASTY TOTAL ANTERIOR APPROACH REVISION, LEFT WITH C-ARM (Left) 1 Day Post-Op  Precautions: Fall, WBAT, Total hip (dislocation)      ASSESSMENT:  Pt supine in bed upon arrival.  Pt rating pain in L hip 2/10 using numerical pain scale. Pt reports no dizziness at this time, difficulty urinating upon attempt. Pt able to retain ed from morning session and apply techniques for safer transfer supine<>sit<>stand CGA/SBA. Gait training in hallway w/ RW, WBAT, GB and CGA w/ antalgic pattern. Stair training using B UE support, step to pattern and CGA. Pt returned to supine in bed w/ all needs within reach, ice pack to L hip and abductor pillow placed. Nurse Francisca Lopez aware.   Progression toward goals:   [x]      Improving appropriately and progressing toward goals  [] Improving slowly and progressing toward goals  []      Not making progress toward goals and plan of care will be adjusted     PLAN:  Patient continues to benefit from skilled intervention to address the above impairments. Continue treatment per established plan of care. Discharge Recommendations: Home Health  Further Equipment Recommendations for Discharge:  N/A    AMPA: 18/24    This AMPA score should be considered in conjunction with interdisciplinary team recommendations to determine the most appropriate discharge setting. Patient's social support, diagnosis, medical stability, and prior level of function should also be taken into consideration. SUBJECTIVE:   Patient stated I feel better.     OBJECTIVE DATA SUMMARY:   Critical Behavior:  Neurologic State: Alert  Orientation Level: Oriented X4  Cognition: Appropriate for age attention/concentration, Follows commands  Safety/Judgement: Awareness of environment, Decreased insight into deficits  Functional Mobility Training:  Bed Mobility:  Supine to Sit: Contact guard assistance; Additional time (vc)  Sit to Supine: Contact guard assistance; Additional time (vc)  Scooting: Contact guard assistance; Additional time;Stand-by assistance (vc)  Transfers:  Sit to Stand: Contact guard assistance; Additional time (vc)  Stand to Sit: Contact guard assistance; Additional time (vc)  Balance:  Sitting: Intact  Standing: Intact; With support   Range Of Motion:   AROM: Generally decreased, functional   PROM: Generally decreased, functional   Posture:   Wheelchair Mobility:   Ambulation/Gait Training:  Distance (ft): 90 Feet (ft)  Assistive Device: Walker, rolling;Gait belt  Ambulation - Level of Assistance: Contact guard assistance; Additional time  Gait Abnormalities: Antalgic;Decreased step clearance; Step to gait  Left Side Weight Bearing: As tolerated  Base of Support: Shift to right  Stance: Left decreased  Speed/Faye: Slow  Step Length: Left shortened;Right shortened  Swing Pattern: Left asymmetrical;Right asymmetrical  Interventions: Safety awareness training; Tactile cues; Verbal cues; Visual/Demos  Stairs:  Number of Stairs Trained: 1  Stairs - Level of Assistance: Contact guard assistance; Additional time (vc)  Rail Use: Both  Neuro Re-Education:  Therapeutic Exercises:   Pain:  Pain level pre-treatment: 2/10  Pain level post-treatment: 2/10   Pain Intervention(s): Medication (see MAR); Rest, Ice, Repositioning   Response to intervention: Nurse notified, See doc flow    Activity Tolerance:   Fair   Please refer to the flowsheet for vital signs taken during this treatment. After treatment:   [] Patient left in no apparent distress sitting up in chair  [x] Patient left in no apparent distress in bed  [x] Call bell left within reach  [x] Nursing notified  [x] Caregiver present  [] Bed alarm activated  [] SCDs applied      COMMUNICATION/EDUCATION:   [x]         Role of Physical Therapy in the acute care setting. [x]         Fall prevention education was provided and the patient/caregiver indicated understanding. [x]         Patient/family have participated as able in working toward goals and plan of care. [x]         Patient/family agree to work toward stated goals and plan of care. []         Patient understands intent and goals of therapy, but is neutral about his/her participation.   []         Patient is unable to participate in stated goals/plan of care: ongoing with therapy staff.  []         Other:        Trem[place]ne , PT   Time Calculation: 26 mins    [facility] AM-PAC® Basic Mobility Inpatient Short Form (6-Clicks) Version 2    How much HELP from another person does the patient currently need    (If the patient hasn't done an activity recently, how much help from another person do you think he/she would need if he/she tried?)   Total (Total A or Dep)   A Lot  (Mod to Max A)   A Little (Sup or Min A)   None (Mod I to I)   Turning from your back to your side while in a flat bed without using bedrails? [] 1 [] 2 [x] 3 [] 4   2. Moving from lying on your back to sitting on the side of a flat bed without using bedrails? [] 1 [] 2 [x] 3 [] 4   3. Moving to and from a bed to a chair (including a wheelchair)? [] 1 [] 2 [x] 3 [] 4   4. Standing up from a chair using your arms (e.g., wheelchair, or bedside chair)? [] 1 [] 2 [x] 3 [] 4   5. Walking in hospital room? [] 1 [] 2 [x] 3 [] 4   6. Climbing 3-5 steps with a railing?+   [] 1 [] 2 [x] 3 [] 4   +If stair climbing cannot be assessed, skip item #6. Sum responses from items 1-5. Based on an AM-PAC score of 18/24 (**/20 if omitting stairs) and their current functional mobility deficits, it is recommended that the patient have 3-5 sessions per week of Physical Therapy at d/c to increase the patient's independence. 9/12/2022 1311 by Kellie Best, PT  Note: []  Patient has met MD brenda eden for d/c home   []  Recommend HH with 24 hour adult care   [x]  Benefit from additional acute PT session to address:  transfer, gait and stair training    PHYSICAL THERAPY EVALUATION    Patient: Anatoliy Argueta (17 y.o. female)  Date: 9/12/2022  Primary Diagnosis: Hip dislocation, left, [facility] [S73.005A]  Procedure(s) (LRB):  HIP ARTHROPLASTY TOTAL ANTERIOR APPROACH REVISION, LEFT WITH C-ARM (Left) 1 Day Post-Op   Precautions:   Fall, WBAT, Total hip (dislocation)    PLOF: Ambulation w/ RW; multiple dislocations in past    ASSESSMENT :  Based on the objective data described below, the patient presents with decreased mobility in regards to bed mobility, transfers, gt quality and tolerance, balance, stair negotiation and safety due to L LAURA surgery. Decreased AROM of L LE, dec strength of L LE, pain in L LE also impacting pt functional mobility. Pt rating pain on numerical pain scale pre/post and during session 6/10.   Pt and  ed regarding mobility safety, WB, HEP, ice application/use, elevation, environmental safety and need to use call bell for activity. Stressed importance of body alignment, safe mobility to prevent possible dislocation in future w/ pt requiring additional time and frequent vc to adhere to safety. Pt able to perform supine<>sit w/ min A/CGA additional time and sit<>stand w/ CGA/min A. Safety vc required throughout session to reinforce safety. Pt able to participate in gt training using RW, GB, WBAT and CGA w/ antalgic gt pattern. Pt c/o feeling faint and required return to bed. BP 95/57. Answered questions by pt and  in regards to PT and mobility. Pt left supine in bed (/59) w/ all needs within reach, abductor wedge pillow in place, B SCD reapplied and ice pack to L hip. Nurse Charly aware of session and outcomes. Recommend HHPT with responsible adult care at least 24 hours upon hospital d/c. Patient will benefit from skilled intervention to address the above impairments.   Patient's rehabilitation potential is considered to be Fair  Factors which may influence rehabilitation potential include:   []         None noted  []         Mental ability/status  [x]         Medical condition  []         Home/family situation and support systems  [x]         Safety awareness  [x]         Pain tolerance/management  []         Other:      PLAN :  Recommendations and Planned Interventions:   [x]           Bed Mobility Training             []    Neuromuscular Re-Education  [x]           Transfer Training                   []    Orthotic/Prosthetic Training  [x]           Gait Training                          [x]    Modalities  [x]           Therapeutic Exercises           [x]    Edema Management/Control  [x]           Therapeutic Activities            [x]    Family Training/Education  [x]           Patient Education  []           Other (comment):    Frequency/Duration: Patient will be followed by physical therapy 1-2 times per day/4-7 days per week to address goals. Discharge Recommendations: Home Health  Further Equipment Recommendations for Discharge: N/A    AMPA: 15/24    This [facility] score should be considered in conjunction with interdisciplinary team recommendations to determine the most appropriate discharge setting. Patient's social support, diagnosis, medical stability, and prior level of function should also be taken into consideration. SUBJECTIVE:   Patient stated I can feel what it feels like when it pops out.     OBJECTIVE DATA SUMMARY:     Past Medical History:   Diagnosis Date    Arthritis     osteo    Difficult intubation     esophagus needs to be stretch every 2 years, had done 03/2022    GERD (gastroesophageal reflux disease)     relux, and hiatal hernia    Hypertension     Osteoarthritis of right knee 2/1/2013    Other ill-defined conditions(799.89)     hiatal hernia    Psychiatric disorder     depression    S/P total knee arthroplasty 02/04/2013     Past Surgical History:   Procedure Laterality Date    HX CATARACT REMOVAL      left eye    HX CHOLECYSTECTOMY      laparoscopic    HX COLONOSCOPY      had 2 done, f/u in 10 years    HX HEENT  04/2022    repair detached retena    HX HYSTERECTOMY      TVH    HX KNEE ARTHROSCOPY      right knee replacement    HX OOPHORECTOMY      BSO    HX ORTHOPAEDIC      manipulation of left shoulder under anesthesia     Barriers to Learning/Limitations: yes;  anesthesia, physical  Compensate with: Visual Cues, Verbal Cues, and Tactile Cues  Home Situation:  Home Situation  Home Environment: Private residence  # Steps to Enter: 3  Rails to Enter: Yes  Hand Rails : Bilateral (wide)  One/Two Story Residence: One story  Living Alone: No  Support Systems: Spouse/Significant Other  Patient Expects to be Discharged to[de-identified] Home with home health  Current DME Used/Available at Home: Walker, rolling  Critical Behavior:  Neurologic State: Alert  Orientation Level: Oriented X4  Cognition: Appropriate for age attention/concentration; Follows commands  Safety/Judgement: Awareness of environment;Decreased insight into deficits  Psychosocial  Patient Behaviors: Calm; Cooperative  Family  Behaviors: Calm;Supportive  Skin Condition/Temp: Dry;Warm  Family  Behaviors: Calm;Supportive  Skin Integrity: Incision (comment) (L hip)  Skin Integumentary  Skin Color: Appropriate for ethnicity  Skin Condition/Temp: Dry;Warm  Skin Integrity: Incision (comment) (L hip)  Strength:    Strength: Generally decreased, functional  Tone & Sensation:   Tone: Normal  Sensation: Intact  Range Of Motion:  AROM: Generally decreased, functional  PROM: Generally decreased, functional  Posture:  Functional Mobility:  Bed Mobility:  Supine to Sit: Contact guard assistance; Additional time (vc)  Sit to Supine: Contact guard assistance;Minimum assistance; Additional time (vc)  Scooting: Contact guard assistance; Additional time (vc)  Transfers:  Sit to Stand: Contact guard assistance;Minimum assistance; Additional time (vc)  Stand to Sit: Contact guard assistance; Additional time (vc)  Balance:   Sitting: Intact  Standing: Intact; With support  Ambulation/Gait Training:  Distance (ft): 10 Feet (ft)  Assistive Device: Walker, rolling;Gait belt  Ambulation - Level of Assistance: Contact guard assistance; Additional time (vc)  Gait Abnormalities: Antalgic;Decreased step clearance; Step to gait  Left Side Weight Bearing: As tolerated  Base of Support: Shift to right  Stance: Left decreased  Speed/Faye: Slow  Step Length: Left shortened;Right shortened  Swing Pattern: Left asymmetrical;Right asymmetrical  Interventions: Safety awareness training; Tactile cues; Verbal cues; Visual/Demos  Therapeutic Exercises:   Encouraged HEP  Pain:  Pain level pre-treatment: 6/10   Pain level post-treatment: 6/10   Pain Intervention(s) : Medication (see MAR);  Rest, Ice, Repositioning  Response to intervention: Nurse notified, See doc flow    Activity Tolerance:   Fair -  Please refer to the flowsheet for vital signs taken during this treatment. After treatment:   []         Patient left in no apparent distress sitting up in chair  [x]         Patient left in no apparent distress in bed  [x]         Call bell left within reach  [x]         Nursing notified  [x]         Caregiver present  []         Bed alarm activated  [x]         SCDs applied    COMMUNICATION/EDUCATION:   [x]         Role of Physical Therapy in the acute care setting. [x]         Fall prevention education was provided and the patient/caregiver indicated understanding. [x]         Patient/family have participated as able in goal setting and plan of care. [x]         Patient/family agree to work toward stated goals and plan of care. []         Patient understands intent and goals of therapy, but is neutral about his/her participation. []         Patient is unable to participate in goal setting/plan of care: ongoing with therapy staff.  []         Other: Thank you for this referral.  Nancy Diaz, PT   Time Calculation: 35 mins      Eval Complexity: History: HIGH Complexity :3+ comorbidities / personal factors will impact the outcome/ POC Exam:MEDIUM Complexity : 3 Standardized tests and measures addressing body structure, function, activity limitation and / or participation in recreation  Presentation: LOW Complexity : Stable, uncomplicated  Clinical Decision Making:Low Complexity    Overall Complexity:LOW     [employer] AM-PAC® Basic Mobility Inpatient Short Form (6-Clicks) Version 2    How much HELP from another person does the patient currently need    (If the patient hasn't done an activity recently, how much help from another person do you think he/she would need if he/she tried?)   Total (Total A or Dep)   A Lot  (Mod to Max A)   A Little (Sup or Min A)   None (Mod I to I)   Turning from your back to your side while in a flat bed without using bedrails? [] 1 [x] 2 [] 3 [] 4   2.  Moving from lying on your back to sitting on the side of a flat bed without using bedrails? [] 1 [] 2 [x] 3 [] 4   3. Moving to and from a bed to a chair (including a wheelchair)? [] 1 [] 2 [x] 3 [] 4   4. Standing up from a chair using your arms (e.g., wheelchair, or bedside chair)? [] 1 [x] 2 [] 3 [] 4   5. Walking in hospital room? [] 1 [] 2 [x] 3 [] 4   6. Climbing 3-5 steps with a railing?+   [] 1 [x] 2 [] 3 [] 4   +If stair climbing cannot be assessed, skip item #6. Sum responses from items 1-5. Based on an AM-PAC score of 15/24 and their current functional mobility deficits, it is recommended that the patient have 3-5 sessions per week of Physical Therapy at d/c to increase the patient's independence.

## 2022-09-12 NOTE — PROGRESS NOTES
Progress Note        Patient: Deandre Shipley MRN: [id number]  SSN: [id number]    YOB: 1950  Age: 67 y.o. Sex: female      1 Day Post-Op status post Procedure(s) (LRB):  HIP ARTHROPLASTY TOTAL ANTERIOR APPROACH REVISION, LEFT WITH C-ARM (Left)    Admit Date: 2022  Admit Diagnosis: Hip dislocation, left, initial encounter ([facility] [S73.005A]    Subjective:      Doing well. No complaints. No SOB. No Chest Pain. No Nausea or Vomiting. No problems eating or voiding. Objective:        Temp (24hrs), Av.7 °F (36.5 °C), Min:97.2 °F (36.2 °C), Max:98.3 °F (36.8 °C)    Body mass index is 32.28 kg/m². Patient Vitals for the past 12 hrs:   BP Temp Pulse Resp SpO2   22 0728 (!) 116/48 97.4 °F (36.3 °C) 75 16 95 %   22 0320 124/63 97.2 °F (36.2 °C) 78 16 98 %   22 0030 115/60 98 °F (36.7 °C) 83 -- 96 %   22 (!) 139/54 97.3 °F (36.3 °C) 79 16 93 %     Recent Labs     22  1215   HGB 8.3*   HCT 26.3*   *   K 3.6   CL 99*   CO2 29   BUN 15   CREA 1.11   GLU 97       Physical Exam:  Vital Signs are Stable. No Acute Distress. Alert and Oriented. Negative Homans sign. Toes AROM Full. Neurovascular exam is normal.    Dressing is Clean, Dry, and Intact.     Assessment/Plan:     Stable  Abduction brace    Continue PT/OT  Discharge Plan: Home    Signed By: Robert Banks MD     2022

## 2022-09-12 NOTE — DISCHARGE INSTRUCTIONS
Total Hip Arthroplasty Discharge Instructions   Nora Dillon M.D. Please take the time to review the following instructions before you leave the hospital and use them as guidelines during your recovery from surgery. If you have any questions you may contact my office at [phone]. Wound Care / Dressing Changes:     Two days after your surgery date you should remove your dressing. A big, bulky dressing isn't necessary as long as there isn't any drainage from the incisions. If there is drainage you can put a band-aid, primapore, or mepilex dressing over the incision and change it daily until drainage stops. It isn't necessary to apply antibiotic ointment to your incisions. If you have glue over your incision do not peel it off. If you have steri-strips over your incision they will start to peel off in 7-10 days. They don't need to be removed prior to that. When they begin to peel off you can remove them. They should all be removed by 14 days from you surgery. Keep a towel or gauze in any skin folds that may hang over the incision so that it stays dry. Sharlette Cobble / Bathing: You may only shower. You may shower if there is no drainage from your incisions. Your dressing may be removed for showering. You may get your incisions wet in the shower. Don't vigorously scrub the area where your incisions are. Apply a clean, dry dressing after drying off the area of your incisions. Don't take a tub bath, get in a swimming pool or Jacuzzi until the incisions are completely healed. Do not soak your incisions under water. Weight Bearing Status / Braces:     __x___ Weight bearing as tolerated. Use crutches, walker, or cane as needed for support. You may move your joints as much as tolerated.     _____  Edil Martyr Touch\" weight bearing. Don't bear weight on the leg you had operated on. Use your toes only to steady yourself as you ambulate with crutches or a walker.      _____ Avoid extreme hip extension with external rotation. Home Health:    Home health has been arranged for skilled nursing visits and physical therapy. Your home health has been set up through____________ . If no one from the agency calls you on the day after you arrive home, please contact them at the number provided at discharge. Physical therapy for gait training and strengthening. Continue therapeutic exercises. Physical Therapy:       Begin In-Home Physical Therapy; 3 times a week to work on gait training, range of motion, strengthening, and weight bearing exercises as tolerable. Continue to use your walker or cane when walking; may progress from the walker to a cane to complete total bearing as tolerable. Ice / Elevation:     Continue ice and elevation as needed for pain and swelling. Diet:     Resume your pre hospital diet. If you have excessive nausea or vomiting call your doctor's office. Medication:     1. You will be given a prescription for pain medication when you are discharged for the hospital. Take the medication as needed according to the directions on the prescription bottle. Possible side effects of the medication include dizziness, headache, nausea, vomiting, constipation and urinary retention. If you experience any of these side effects call the office so that we can assist you in relieving them. Discontinue the use of the pain medication if you develop itching, rash, shortness of breath or difficulties swallowing. If these symptoms become severe or aren't relieved by discontinuing the medication you should seek immediate medical attention. Refills of pain medication are authorized during office hours only. (am - pm Mon. thru Fri.)     You should take one Aspirin twice daily for one month from the date of your surgery. This will help to prevent blood clots from forming in your legs. You may resume the medication you were taking prior to your surgery.  Pain medication may change the effects of any antidepressant medication you are taking. If you have any questions about possible interactions between your regular medications and the pain medication you should consult the physician who prescribes your regular medications. Please take over the counter stool softeners while you are taking narcotic pain medication. Pain medications can cause constipation. Stool softeners, warm prune juice and increasing your water and fiber intake can help prevent constipation. Do not take laxatives. Follow Up Appointment[de-identified]    Please call [phone] for a follow appointment with Dr. Garfield Messina in 10-14 days from the time of your surgery. Please let our office know you are scheduling a post-op appointment. Signs and Symptoms to be Aware of: If any of the following signs and symptoms occur, you should contact Dr. Misael Swanson office. Please be advised if problem arises which you feel requires immediate medical attention or you are unable to contact Dr. Misael Swanson office, you should seek immediate medical attention at the emergency department or other health care facility you have access to. Signs and symptoms to watch for include:     1. A sudden increase in swelling and or redness or warmth at the area your surgery was performed which isn't relieved by rest, ice and elevation. 2 Oral temperature greater than 101.5 degrees for 12 hours or more which isn't relieved by an increase in fluid intake and taking two Tylenol every 4-6 hours. 3 Excessive drainage from your incisions, or drainage which hasn't stopped by 72 hours after your surgery despite applying a compressive dressing, ice and elevation. 4 Calf pain, tenderness, redness or swelling which isn't relieved with rest and elevation. 5 Fever, chills, shortness of breath, chest pain, nausea, vomiting or other signs and symptoms which are of concern to you.      Other Instructions:

## 2022-09-12 NOTE — PROGRESS NOTES
Pt unable to urinate post Op Bladder scan value 930 ml. Called Orthopedic after hours. Waiting on the call back.

## 2022-09-12 NOTE — OP NOTES
[facility]    Name:  Meryle Flasher  MR#:   [id number]  :  1950  ACCOUNT #:  [de-identified]  DATE OF SERVICE:  2022    PREOPERATIVE DIAGNOSIS:  Left hip dislocation. POSTOPERATIVE DIAGNOSIS:  Left hip dislocation. PROCEDURES PERFORMED:  Left hip acetabular revision and head ball swap. SURGEONS:  Johana Carmona MD and Shirley Leary MD    ASSISTANT:  MAIA Powers    ANESTHESIA:  General.    COMPLICATIONS:  None. SPECIMENS REMOVED:  Specimens sent, none. IMPLANTS:  New constrained acetabular liner and head ball. ESTIMATED BLOOD LOSS:  200 mL. INDICATIONS/BRIEF HISTORY:  The patient is a 56-year-old female, who presented to the ER with a left hip dislocation. Two weeks ago, she underwent revision of her hip to a constrained liner due to prior dislocations. We did discuss the option of management including attempted closed reduction versus attempted open reduction versus revision. She understood the risks and benefits and elected to proceed. PROCEDURE:  The patient was brought to the operating suite. Properly identified, placed supine upon the operating table, and placed under general anesthetic. Once an adequate level of anesthesia was obtained, she was placed onto the Black fracture table for which time-out was taken. We attempted traction. We were able to get the head ball down to the liner, but the inside component was spun around, so we were unable to attempt any kind of reduction. At that point, she was then prepped and draped for surgery. The previous incision was utilized, we opened up the wound. We dissected down through the subcutaneous tissue. The fascia was reopened in the previous plane, this exposed the joint. There was some serosanguinous fluid that was removed. The joint was evaluated. The inner liner was actually damaged from the prior dislocation where the polyethylene inner core was bent.   We then removed the constrained liner. The head ball was also removed from the femur. The acetabular and femoral components attached to the bone were stably fixed. We lengthened the ball to a +8 that was placed onto the trunnion that was in good condition. A new constrained liner was placed into the acetabular component. The head ball was then seated fully into the liner and locked. We took her through range of motion to make sure that she would not dislocate. Once we are happy with that, the wound was copiously irrigated. The capsule was closed with 0 Vicryl above the capsule. The fascia was closed with 0 Vicryl in a running fashion. Subcutaneous tissue was closed with 2-0 Vicryl. The skin was closed with 3-0 Monocryl. A sterile compressive dressing was applied. The patient was awakened and transferred to the recovery room in stable condition. All needle and sponge counts were reported as correct at the end of the case. Fluoroscopic imaging was used during the case.           Leopoldo Necessary, MD AB/[email or web address]/[email or web address]  D:  09/12/2022 7:47  T:  09/12/2022 10:56  JOB #:  [id number]

## 2022-09-12 NOTE — PERIOP NOTES
TRANSFER - OUT REPORT:    Verbal report given to Joelle Duran(name) on Slade Green  being transferred to [address] [place] [address](unit) for routine post - op       Report consisted of patients Situation, Background, Assessment and   Recommendations(SBAR). Information from the following report(s) SBAR, Kardex, OR Summary, Procedure Summary, Intake/Output, and MAR was reviewed with the receiving nurse. Lines:   Peripheral IV 09/11/22 Anterior;Right Forearm (Active)   Site Assessment Clean, dry, & intact 09/11/22 1935   Phlebitis Assessment 0 09/11/22 1935   Infiltration Assessment 0 09/11/22 1935   Dressing Status Clean, dry, & intact 09/11/22 1935   Dressing Type Transparent 09/11/22 1935        Opportunity for questions and clarification was provided.       Patient transported with:   Registered Nurse

## 2022-09-13 ENCOUNTER — HOME HEALTH ADMISSION (OUTPATIENT)
Dept: HOME HEALTH SERVICES | Facility: HOME HEALTH | Age: 72
End: 2022-09-13
Payer: MEDICARE

## 2022-09-13 VITALS
DIASTOLIC BLOOD PRESSURE: 66 MMHG | BODY MASS INDEX: 32.18 KG/M2 | SYSTOLIC BLOOD PRESSURE: 123 MMHG | WEIGHT: 224.8 LBS | RESPIRATION RATE: 18 BRPM | HEART RATE: 76 BPM | HEIGHT: 70 IN | TEMPERATURE: 97.8 F | OXYGEN SATURATION: 99 %

## 2022-09-13 PROBLEM — S73.006A DISLOCATION CLOSED, HIP (HCC): Status: ACTIVE | Noted: 2022-09-13

## 2022-09-13 PROCEDURE — 97535 SELF CARE MNGMENT TRAINING: CPT

## 2022-09-13 PROCEDURE — 74011250637 HC RX REV CODE- 250/637: Performed by: ORTHOPAEDIC SURGERY

## 2022-09-13 PROCEDURE — 74011000250 HC RX REV CODE- 250: Performed by: ORTHOPAEDIC SURGERY

## 2022-09-13 PROCEDURE — G0378 HOSPITAL OBSERVATION PER HR: HCPCS

## 2022-09-13 PROCEDURE — 2709999900 HC NON-CHARGEABLE SUPPLY

## 2022-09-13 PROCEDURE — 97166 OT EVAL MOD COMPLEX 45 MIN: CPT

## 2022-09-13 RX ADMIN — MELOXICAM 15 MG: 7.5 TABLET ORAL at 09:25

## 2022-09-13 RX ADMIN — ESCITALOPRAM OXALATE 20 MG: 10 TABLET ORAL at 09:25

## 2022-09-13 RX ADMIN — TRIAMTERENE AND HYDROCHLOROTHIAZIDE 1 TABLET: 37.5; 25 TABLET ORAL at 09:25

## 2022-09-13 RX ADMIN — CLONAZEPAM 1 MG: 0.5 TABLET ORAL at 09:26

## 2022-09-13 RX ADMIN — TAMSULOSIN HYDROCHLORIDE 0.4 MG: 0.4 CAPSULE ORAL at 09:25

## 2022-09-13 RX ADMIN — SODIUM CHLORIDE, PRESERVATIVE FREE 10 ML: 5 INJECTION INTRAVENOUS at 05:42

## 2022-09-13 RX ADMIN — SODIUM CHLORIDE, PRESERVATIVE FREE 10 ML: 5 INJECTION INTRAVENOUS at 14:11

## 2022-09-13 RX ADMIN — ASPIRIN 325 MG: 325 TABLET, COATED ORAL at 09:26

## 2022-09-13 RX ADMIN — ACETAMINOPHEN 650 MG: 325 TABLET ORAL at 12:05

## 2022-09-13 NOTE — PROGRESS NOTES
Problem: Falls - Risk of  Goal: *Absence of Falls  Description: Document Clydene Bone Fall Risk and appropriate interventions in the flowsheet. Outcome: Progressing Towards Goal  Note: Fall Risk Interventions:  Mobility Interventions: Patient to call before getting OOB, Utilize walker, cane, or other assistive device         Medication Interventions: Teach patient to arise slowly, Patient to call before getting OOB    Elimination Interventions: Call light in reach, Patient to call for help with toileting needs    History of Falls Interventions:  Investigate reason for fall

## 2022-09-13 NOTE — PROGRESS NOTES
Discussed with patient and family discharge instructions including at-home care, wound care, home health, pain management, follow-up care. Pt expressed no concerns at this time. To be transported home by . Abductor brace present on patient. IV removed, pt tolerated well.

## 2022-09-13 NOTE — ROUTINE PROCESS
Pt has not voided the whole day. Bladder scan result of 325mL. Tried to reach MD for straight cath order, but line is busy. 0040 Relayed message to answering service. Waiting callback. [facility] ordered straight cath x1.

## 2022-09-13 NOTE — PROGRESS NOTES
D/C Plan: [address] [facility] [address] with physician follow up     CM has been notified [facility] does not have staff to service this pt. CM met with pt and her  at bedside to discuss care transition. CM provided pt/family with a list of [facility] agencies to review. Pt/family have selected [facility] as a second choice and [facility] as a third choice. CMS has been notified to assist.  CM has been notified [facility] is unable to service this pt. Referral has been sent to [facility] and they are able to assist with this pt. Anticipate pt will transition home today with the above services. Pt's  will transport pt home today. No other care transition of care needs have been identified. Care Management Interventions  PCP Verified by CM:  Yes  Mode of Transport at Discharge: Self  Transition of Care Consult (CM Consult): Discharge Planning, [address]: No  Reason Outside [place]: Patient already serviced by other home [id number]: Spouse/Significant Other  Confirm Follow Up Transport: Family  The Plan for Transition of Care is Related to the Following Treatment Goals : dislocated right hip  The Patient and/or Patient Representative was Provided with a Choice of Provider and Agrees with the Discharge Plan?: Yes  Name of the Patient Representative Who was Provided with a Choice of Provider and Agrees with the Discharge Plan: Becky Spence, patient  Freedom of Choice List was Provided with Basic Dialogue that Supports the Patient's Individualized Plan of Care/Goals, Treatment Preferences and Shares the Quality Data Associated with the Providers?: Yes  Discharge Location  Patient Expects to be Discharged to[de-identified] Home with home health

## 2022-09-13 NOTE — NURSE NAVIGATOR
Upgraded to Inpatient per Dr. Nino Wahl order since Medicare Part A/B and did not meet outpatient goals so has spent 2 nights in the hospital.

## 2022-09-13 NOTE — PROGRESS NOTES
Dr. Anoop Hutson would like pt to have abduction brace for pt to go home with prior to discharge.  for unit ordering device and awaiting approximate arrival time. Pt is aware, MD states pt could go home if she insists on it without brace but this puts her at risk for repeat issues same as before. Will update pt on brace status and MD recommendations. 1215: Pt agrees to wait in hospital while awaiting arrival of abductor brace. 1629:  orthotics present to fit pt and provide abductor brace; MD notified. Awaiting updated discharge orders.

## 2022-09-13 NOTE — DISCHARGE SUMMARY
Hip Reduction Discharge Instructions   Aneudy Wells M.D. Please take the time to review the following instructions before you leave the hospital and use them as guidelines during your recovery from surgery. If you have any questions you may contact my office at [phone]. Wound Care / Dressing Changes:     Two days after your surgery date you should remove your dressing. A big, bulky dressing isn't necessary as long as there isn't any drainage from the incisions. If there is drainage you can put a band-aid, primapore, or mepilex dressing over the incision and change it daily until drainage stops. It isn't necessary to apply antibiotic ointment to your incisions. If you have glue over your incision do not peel it off. If you have steri-strips over your incision they will start to peel off in 7-10 days. They don't need to be removed prior to that. When they begin to peel off you can remove them. They should all be removed by 14 days from you surgery. Keep a towel or gauze in any skin folds that may hang over the incision so that it stays dry. [place] / Bathing: You may only shower. You may shower if there is no drainage from your incisions. Your dressing may be removed for showering. You may get your incisions wet in the shower. Don't vigorously scrub the area where your incisions are. Apply a clean, dry dressing after drying off the area of your incisions. Don't take a tub bath, get in a swimming pool or Jacuzzi until the incisions are completely healed. Do not soak your incisions under water. Weight Bearing Status / Braces:     ___X__ Weight bearing as tolerated. Use crutches, walker, or cane as needed for support. Use Abduction brace when ambulating.     _____  Muriel Mad Touch\" weight bearing. Don't bear weight on the leg you had operated on. Use your toes only to steady yourself as you ambulate with crutches or a walker. _____ Avoid extreme hip extension with external rotation. Home Health:    Home health has been arranged for skilled nursing visits and physical therapy. Your home health has been set up through [facility]. If no one from the agency calls you on the day after you arrive home, please contact them at the number provided at discharge. Physical therapy for gait training and strengthening. Continue therapeutic exercises. Physical Therapy:       Begin In-Home Physical Therapy; 3 times a week to work on gait training, range of motion, strengthening, and weight bearing exercises as tolerable. Continue to use your walker or cane when walking; may progress from the walker to a cane to complete total bearing as tolerable. Ice / Elevation:     Continue ice and elevation as needed for pain and swelling. Diet:     Resume your pre hospital diet. If you have excessive nausea or vomiting call your doctor's office. Medication:     1. You will be given a prescription for pain medication when you are discharged for the hospital. Take the medication as needed according to the directions on the prescription bottle. Possible side effects of the medication include dizziness, headache, nausea, vomiting, constipation and urinary retention. If you experience any of these side effects call the office so that we can assist you in relieving them. Discontinue the use of the pain medication if you develop itching, rash, shortness of breath or difficulties swallowing. If these symptoms become severe or aren't relieved by discontinuing the medication you should seek immediate medical attention. Refills of pain medication are authorized during office hours only. (am - pm Mon. thru Fri.)     You should take one Aspirin 81 MG twice daily for one month from the date of your surgery. This will help to prevent blood clots from forming in your legs. You may resume the medication you were taking prior to your surgery.  Pain medication may change the effects of any antidepressant medication you are taking. If you have any questions about possible interactions between your regular medications and the pain medication you should consult the physician who prescribes your regular medications. Please take over the counter stool softeners while you are taking narcotic pain medication. Pain medications can cause constipation. Stool softeners, warm prune juice and increasing your water and fiber intake can help prevent constipation. Do not take laxatives. Follow Up Appointment[de-identified]    Please call [phone] for a follow appointment with Dr. Walt Setvens in 10-14 days from the time of your surgery. Please let our office know you are scheduling a post-op appointment. Signs and Symptoms to be Aware of: If any of the following signs and symptoms occur, you should contact Dr. Chet Boateng office. Please be advised if problem arises which you feel requires immediate medical attention or you are unable to contact Dr. Ceht Boateng office, you should seek immediate medical attention at the emergency department or other health care facility you have access to. Signs and symptoms to watch for include:     1. A sudden increase in swelling and or redness or warmth at the area your surgery was performed which isn't relieved by rest, ice and elevation. 2 Oral temperature greater than 101.5 degrees for 12 hours or more which isn't relieved by an increase in fluid intake and taking two Tylenol every 4-6 hours. 3 Excessive drainage from your incisions, or drainage which hasn't stopped by 72 hours after your surgery despite applying a compressive dressing, ice and elevation. 4 Calf pain, tenderness, redness or swelling which isn't relieved with rest and elevation. 5 Fever, chills, shortness of breath, chest pain, nausea, vomiting or other signs and symptoms which are of concern to you.      Other Instructions:

## 2022-09-13 NOTE — PROGRESS NOTES
Problem: Self Care Deficits Care Plan (Adult)  Goal: *Acute Goals and Plan of Care (Insert Text)  Description: Initial Occupational Therapy Goals (9/13/2022) Within 7 day(s):    1. Patient will perform grooming standing sinkside with supervision for increased independence with ADLs. 2. Patient will perform LB dressing with supervision & A/E PRN for increased independence with ADLs. 3. Patient will perform toilet transfer with supervision for increased independence with ADLs. 4. Patient will perform all aspects of toileting with supervision for increased independence with ADLs. 5. Patient will independently apply energy conservation techniques with 1 verbal cue(s)for increased independence with ADLs. 6. Patient will perform bathroom mobility with supervision for increased independence/safety with ADLs. Outcome: Progressing Towards Goal  OCCUPATIONAL THERAPY EVALUATION    Patient: Pratibha Mistry (57 y.o. female)  Date: 9/13/2022  Primary Diagnosis: Hip dislocation, left, [facility] [S73.005A]  Procedure(s) (LRB):  HIP ARTHROPLASTY TOTAL ANTERIOR APPROACH REVISION, LEFT WITH C-ARM (Left) 2 Days Post-Op   Precautions: Fall, WBAT, Total hip  PLOF: pt mod I for ADLs/functional mobility, has dislocated hip several times prior    ASSESSMENT AND RECOMMENDATIONS:  Based on the objective data described below, the patient presents with LLE decreased ROM and strength affecting LE ADLs. Pt found supine in bed, reporting pain 0/10, agreeable to therapy. Pt able to sit up to EOB with SBA/additional time, pt verbalizes/demonstrates good positioning of LLE during transfer. Educated pt on proper body mechanics for ADLs s/p THR, including adaptive strategies to maintain hip precautions. Pt CGA/SBA for STS/bathroom mobility/toilet transfer with vc for safe body mechanics. Pt voided and performed bladder hygiene with SBA.  Pt washed hands standing at sink with SBA and ambulated back to EOB, CGA to sit and vc for controlled sit. Provided/educated on use of hip kit for increased independence. Pt SBA for upper body dressing. Pt required mod A to thread B feet through underwear/shorts, SBA in standing to pull up to waist. Pt required cueing for hip precaution reminders during functional tasks. Spouse present during session for education on home safety. Provided opportunity for pt to voice questions on ADL performance when home, pt has no further concerns. Pt left supine in bed, call bell/phone within reach. Patient will benefit from skilled Occupational Therapy intervention to maximize safety/independence with ADLs at d/c.    Education: Reviewed home safety, body mechanics, importance of moving every hour to prevent joint stiffness, role of ice for edema/pain control, Rolling Walker management/safety, and adaptive dressing techniques with patient verbalizing  understanding at this time     Patient will benefit from skilled intervention to address the above impairments.   Patient's rehabilitation potential is considered to be Good  Factors which may influence rehabilitation potential include:   []             None noted  []             Mental ability/status  [x]             Medical condition  []             Home/family situation and support systems  [x]             Safety awareness  []             Pain tolerance/management  []             Other:        PLAN :  Recommendations and Planned Interventions:   [x]               Self Care Training                  [x]      Therapeutic Activities  [x]               Functional Mobility Training   [x]      Cognitive Retraining  []               Therapeutic Exercises           [x]      Endurance Activities  []               Balance Training                    []      Neuromuscular Re-Education  []               Visual/Perceptual Training     [x]      Home Safety Training  [x]               Patient Education                   [x]      Family Training/Education  []               Other (comment):    Frequency/Duration: Patient will be followed by Occupational Therapy 1-2 times per day/4-7 days per week to address goals. Discharge Recommendations: Home health   Further Equipment Recommendations for Discharge: bedside commode    AMPAC: Based on an AM-PAC score of 16/24 and their current ADL deficits; it is recommended that the patient have 3-5 sessions per week of Occupational Therapy at d/c to increase the patient's independence. This AMPAC score should be considered in conjunction with interdisciplinary team recommendations to determine the most appropriate discharge setting. Patient's social support, diagnosis, medical stability, and prior level of function should also be taken into consideration. SUBJECTIVE:   Patient stated I don't have any pain.     OBJECTIVE DATA SUMMARY:     Past Medical History:   Diagnosis Date    Arthritis     osteo    Difficult intubation     esophagus needs to be stretch every 2 years, had done 03/2022    GERD (gastroesophageal reflux disease)     relux, and hiatal hernia    Hypertension     Osteoarthritis of right knee 2/1/2013    Other ill-defined conditions(799.89)     hiatal hernia    Psychiatric disorder     depression    S/P total knee arthroplasty 02/04/2013     Past Surgical History:   Procedure Laterality Date    HX CATARACT REMOVAL      left eye    HX CHOLECYSTECTOMY      laparoscopic    HX COLONOSCOPY      had 2 done, f/u in 10 years    HX HEENT  04/2022    repair detached retena    HX HYSTERECTOMY      TVH    HX KNEE ARTHROSCOPY      right knee replacement    HX OOPHORECTOMY      BSO    HX ORTHOPAEDIC      manipulation of left shoulder under anesthesia     Barriers to Learning/Limitations: yes;  physical  Compensate with: visual, verbal, tactile, kinesthetic cues/model    Home Situation/Prior Level of Function:   Home Situation  Home Environment: Private residence  # Steps to Enter: 3  Rails to Enter: Yes  Hand Rails : Bilateral (wide)  One/Two Story Residence: One story  Living Alone: No  Support Systems: Spouse/Significant Other  Patient Expects to be Discharged to[de-identified] Home with home health  Current DME Used/Available at Home: Walker, rolling  []  Right hand dominant   []  Left hand dominant    Cognitive/Behavioral Status:  Neurologic State: Alert  Orientation Level: Oriented X4  Cognition: Follows commands  Safety/Judgement: Awareness of environment    Skin: L hip incision w/ Mepilex   Edema: compression hose in place & applied ice     Coordination: BUE  Coordination: Within functional limits  Fine Motor Skills-Upper: Left Intact; Right Intact    Gross Motor Skills-Upper: Left Intact; Right Intact    Balance:  Sitting: Intact  Standing: Intact; With support    Strength: BUE  Strength: Generally decreased, functional    Tone & Sensation:BUE  Tone: Normal  Sensation: Intact    Range of Motion: BUE  AROM: Generally decreased, functional  PROM: Generally decreased, functional    Functional Mobility and Transfers for ADLs:  Bed Mobility:  Supine to Sit: Stand-by assistance; Additional time (vc)  Sit to Supine: Contact guard assistance; Additional time (vc)  Scooting: Stand-by assistance  Transfers:  Sit to Stand: Stand-by assistance (vc)   Toilet Transfer : Contact guard assistance;Stand-by assistance (vc)   Bathroom Mobility: Contact guard assistance;Stand-by assistance    ADL Assessment:  Feeding: Independent  Oral Facial Hygiene/Grooming: Stand-by assistance  Bathing: Moderate assistance  Upper Body Dressing: Stand-by assistance  Lower Body Dressing: Moderate assistance  Toileting: Contact guard assistance    ADL Intervention:  Grooming  Grooming Assistance: Stand-by assistance  Position Performed: Standing  Washing Hands: Stand-by assistance    Upper Body Dressing Assistance  Dressing Assistance: Stand-by assistance  Bra: Stand-by assistance  Pullover Shirt: Stand-by assistance    Lower Body Dressing Assistance  Dressing Assistance:  Moderate assistance  Underpants: Moderate assistance  Pants With Elastic Waist: Moderate assistance  Socks: Set-up; Minimum assistance  Leg Crossed Method Used: No  Position Performed: Seated edge of bed  Cues: Visual cues provided;Verbal cues provided  Adaptive Equipment Used: Sock aid    Toileting  Toileting Assistance: Contact guard assistance  Bladder Hygiene: Contact guard assistance  Clothing Management: Contact guard assistance    Cognitive Retraining  Safety/Judgement: Awareness of environment    Pain:  Pain level pre-treatment: 0/10  Pain level post-treatment: 0/10  Pain Intervention(s): Medication administer by Nursing (see MAR); Rest, Ice, Repositioning   Response to intervention: Nurse notified, see doc flow     Activity Tolerance:   Fair. Patient able to stand ~5 minute(s). Patient able to complete ADLs with intermittent rest breaks. Patient limited by strength, ROM, safety. Patient unsteady. Please refer to the flowsheet for vital signs taken during this treatment. After treatment:   []  Patient left in no apparent distress sitting up in chair  []  Patient sitting on EOB  [x]  Patient left in no apparent distress in bed  [x]  Call bell left within reach  [x]  Nursing notified  [x]  Caregiver present  [x]  Ice applied  []  SCD's on while back in bed  [] Bed alarm activated    COMMUNICATION/EDUCATION:   Communication/Collaboration:  [x]       Role of Occupational Therapy in the acute care setting. [x]      Home safety education was provided and the patient/caregiver indicated understanding. [x]      Patient/family have participated as able in goal setting and plan of care. [x]      Patient/family agree to work toward stated goals and plan of care. []      Patient understands intent and goals of therapy, but is neutral about his/her participation. []      Patient is unable to participate in plan of care at this time.     Thank you for this referral.  Teto Bustos, OTR/L  Time Calculation: 28 mins    Eval Complexity: History: MEDIUM Complexity : Expanded review of history including physical, cognitive and psychosocial  history ; Examination: MEDIUM Complexity : 3-5 performance deficits relating to physical, cognitive , or psychosocial skils that result in activity limitations and / or participation restrictions; Decision Making:MEDIUM Complexity : Patient may present with comorbidities that affect occupational performnce. Miniml to moderate modification of tasks or assistance (eg, physical or verbal ) with assesment(s) is necessary to enable patient to complete evaluation     [facility] AM-PAC® Daily Activity Inpatient Short Form (6-Clicks)    How much HELP from another person does the patient currently need    (If the patient hasn't done an activity recently, how much help from another person do you think he/she would need if he/she tried?)   Total (Total A or Dep)   A Lot  (Mod to Max A)   A Little (Sup or Min A)   None (Mod I to I)   Putting on and taking off regular lower body clothing? [] 1 [x] 2 [] 3 [] 4   2. Bathing (including washing, rinsing,      drying)? [] 1 [x] 2 [] 3 [] 4   3. Toileting, which includes using toilet, bedpan or urinal?   [] 1 [] 2 [x] 3 [] 4   4. Putting on and taking off regular upper body clothing? [] 1 [] 2 [x] 3 [] 4   5. Taking care of personal grooming such as brushing teeth? [] 1 [] 2 [x] 3 [] 4   6. Eating meals? [] 1 [] 2 [x] 3 [] 4     Based on an AM-PAC score of 16/24 and their current ADL deficits; it is recommended that the patient have 3-5 sessions per week of Occupational Therapy at d/c to increase the patient's independence.

## 2022-09-13 NOTE — PROGRESS NOTES
1353: Pt waiting for abd brace before d/c.  Educated pt and spouse on use and with dressing. Pt does not wish to do any PT prior to d/c.

## 2022-09-13 NOTE — PROGRESS NOTES
Progress Note  Date:2022       Room:[facility]  Patient Name:Emilia Guevara     Date of Birth:[birth year]     Age:72 y.o. Subjective    Subjective   Review of Systems  Objective         Vitals Last 24 Hours:  TEMPERATURE:  Temp  Av.3 °F (36.8 °C)  Min: 97.7 °F (36.5 °C)  Max: 98.7 °F (37.1 °C)  RESPIRATIONS RANGE: Resp  Av.5  Min: 16  Max: 20  PULSE OXIMETRY RANGE: SpO2  Av.5 %  Min: 95 %  Max: 97 %  PULSE RANGE: Pulse  Av  Min: 77  Max: 81  BLOOD PRESSURE RANGE: Systolic (51GPG), UJA:001 , Min:104 , TDC:210   ; Diastolic (47GVD), ACC:94, Min:52, Max:68    I/O (24Hr): Intake/Output Summary (Last 24 hours) at 2022 0747  Last data filed at 2022 0100  Gross per 24 hour   Intake --   Output 400 ml   Net -400 ml     Objective  Labs/Imaging/Diagnostics    Labs:  CBC:  Recent Labs     22  1215   WBC 6.3   RBC 2.94*   HGB 8.3*   HCT 26.3*   MCV 89.5   RDW 13.6        CHEMISTRIES:  Recent Labs     22  1215   *   K 3.6   CL 99*   CO2 29   BUN 15   CA 8.6   PT/INR:No results for input(s): INR, INREXT in the last 72 hours. No lab exists for component: PROTIME  APTT:No results for input(s): APTT in the last 72 hours. LIVER PROFILE:No results for input(s): AST, ALT in the last 72 hours. No lab exists for component: BILIDIR, BILITOT, ALKPHOS  Lab Results   Component Value Date/Time    ALT (SGPT) <6 (L) 2022 01:00 AM    AST (SGOT) 14 2022 01:00 AM    Alk. phosphatase 147 (H) 2022 01:00 AM    Bilirubin, total 0.2 2022 01:00 AM       Imaging Last 24 Hours:  No results found. Assessment//Plan   Active Problems:    Hip dislocation, left, initial encounter ([facility] (2022)      Assessment & Plan: Stable. No acute concerns.      Electronically signed by Mya Noble PA-C on 2022 at 7:47 AM

## 2022-09-13 NOTE — PROGRESS NOTES
Problem: Falls - Risk of  Goal: *Absence of Falls  Description: Document Stanford Cea Fall Risk and appropriate interventions in the flowsheet.   Outcome: Progressing Towards Goal  Note: Fall Risk Interventions:  Mobility Interventions: Bed/chair exit alarm, Patient to call before getting OOB, Utilize walker, cane, or other assistive device         Medication Interventions: Assess postural VS orthostatic hypotension, Bed/chair exit alarm, Patient to call before getting OOB, Teach patient to arise slowly    Elimination Interventions: Bed/chair exit alarm, Call light in reach, Patient to call for help with toileting needs, Toileting schedule/hourly rounds    History of Falls Interventions: Bed/chair exit alarm

## 2022-09-14 ENCOUNTER — HOME CARE VISIT (OUTPATIENT)
Dept: HOME HEALTH SERVICES | Facility: HOME HEALTH | Age: 72
End: 2022-09-14
Payer: MEDICARE

## 2022-09-14 NOTE — CASE COMMUNICATION
I called to schedule patient and she declined stating she had company today. She agreed to [facility] tomorrow. Late SOC orders entered.

## 2022-09-15 ENCOUNTER — HOME CARE VISIT (OUTPATIENT)
Dept: HOME HEALTH SERVICES | Facility: HOME HEALTH | Age: 72
End: 2022-09-15
Payer: MEDICARE

## 2022-09-15 ENCOUNTER — HOME CARE VISIT (OUTPATIENT)
Dept: SCHEDULING | Facility: HOME HEALTH | Age: 72
End: 2022-09-15
Payer: MEDICARE

## 2022-09-15 VITALS
HEART RATE: 72 BPM | SYSTOLIC BLOOD PRESSURE: 110 MMHG | DIASTOLIC BLOOD PRESSURE: 80 MMHG | OXYGEN SATURATION: 98 % | TEMPERATURE: 97.5 F | RESPIRATION RATE: 18 BRPM

## 2022-09-15 VITALS
OXYGEN SATURATION: 98 % | RESPIRATION RATE: 16 BRPM | HEART RATE: 72 BPM | TEMPERATURE: 97.5 F | DIASTOLIC BLOOD PRESSURE: 80 MMHG | SYSTOLIC BLOOD PRESSURE: 110 MMHG

## 2022-09-15 PROCEDURE — G0299 HHS/HOSPICE OF RN EA 15 MIN: HCPCS

## 2022-09-15 PROCEDURE — 3331090001 HH PPS REVENUE CREDIT

## 2022-09-15 PROCEDURE — 400013 HH SOC

## 2022-09-15 PROCEDURE — G0151 HHCP-SERV OF PT,EA 15 MIN: HCPCS

## 2022-09-15 PROCEDURE — 3331090002 HH PPS REVENUE DEBIT

## 2022-09-15 PROCEDURE — 400018 HH-NO PAY CLAIM PROCEDURE

## 2022-09-15 NOTE — HOME HEALTH
Skilled services/Home bound verification:     Skilled Reason for admission/summary of clinical condition:  Revision of L THR by Dr. Sharon Luz . This patient is homebound for the following reasons Requires considerable and taxing effort to leave the home . Caregiver: spouse. Caregiver assists with IADL's. Medications reconciled and all medications are available in the home this visit. High risk medication teaching regarding anticoagulants, hyperglycemic agents or opiod narcotics performed (specify) roxicodone for risk of overdose    Dr. Sharon Luz notified of any discrepancies/look a like medications/medication interactions no severe interaction noted . Home health supplies by type and quantity ordered/delivered this visit include: mepilex dressing     Patient education provided this visit to include: HEP, fall prevention, dc planning . Patient level of understanding of education provided: Patient was able to partially teach back HEP     Sharps Education Provided: n/a  Patient response to procedure performed:  Patient  needed verbal cues for HEP     Home exercise program/Homework provided: patient was educated with HEP including supine ankle oumps, ankle circles, quad sets, hamstring sets, heel slides and gluteal sets x 20 reps x 3 sets daily to improve ROM and MMT on LLE to improve gait and transfers followed by ice x 20 minutes at a time on L hip to decrease pain and swelling. Patient educated with fall prevention technique by decluttering space, proper use of AD and footwear      Pt/Caregiver instructed on plan of care and are agreeable to plan of care at this time. Physician Dr. Sharon Luz notified of patient admission to home health and plan of care including anticipated frequency of 2w1 3w2 for PT   Discharge planning discussed with patient and caregiver. Discharge planning as follows: dc to family with MD supervision when all goals are met .   Pt/Caregiver did verbalize understanding of discharge planning. Next MD appointment TBD  Patient/caregiver encouraged/instructed to keep appointment as lack of follow through with physician appointment could result in discontinuation of home care services for non-compliance. PMHx: Arthritis        osteo    Difficult intubation        esophagus needs to be stretch every 2 years, had done 03/2022    GERD (gastr oesophageal reflux disease)        relux, and hiatal hernia    Hypertension      Osteoarthritis of right knee 2/1/2013    Other ill-defined conditions(799.89)        hiatal hernia    Psychiatric disorder        depression    S/P total knee arthroplasty     S: pain on L hip with pain scale 5/10 that is managed by pain medication as needed and ice x 20 minutes at a time  O:Patients Goals= Be able to go back to PLOF  Wound/Incision: location, description, drainage: has intact non removable dressing on R hip that did not have any drainage present   PLOF: Lives with spouse in a 1 level house, prior to surgery, she was independent with ADL's and IADL's and was not using any AD for gait but with difficulty due to chronic pain on L hip  STRENGTH R hip flexor 2+/5, R knee flexor and extensor 3-/5  BALANCE Tinetti 11/28 high fall risk due to reduced strength on LLE, gait deviation and decreased efficiency of balance reactions. Patient demonstrated poor use of hip and ankle strategy during standing balance activity. Patient requires support from AD at all times for safety and stability. GAIT Patient was able to ambulate with RW x 30 feet with hip brace with noted antalgic gait, decreased step height and stride length on LLE with slow paced.  Patient was educated with heel to toe gait pattern technique to prevent LOB  BED MOBILITY Patient needed Min A x1 with bed mobility to lift LLE in and out of bed   TRANSFERS Patient needed Min A x1 with bed, chair and toilet transfers using RW   A: PT evaluation completed with the presence of spouse  who is the primary CG, POC established, Med rec done, HEP established  P:Home Safety eval/recommendations: Home health physical therapy initial evaluation performed. Patient demonstrates decreased strength, balance, and endurance which increases patient's overall fall risk and burden of care. Patient would benefit from home health physical therapy to improve balance, strength, and endurance which would decrease fall risk and allow patient to return to prior level of function once all functional goals or full rehab potential is met. patient was educated with HEP including supine ankle oumps, ankle circles, quad sets, hamstring sets, heel slides and gluteal sets x 20 reps x 3 sets daily to improve ROM and MMT on LLE to improve gait and transfers followed by ice x 20 minutes at a time on L hip to decrease pain and swelling. Patient educated with fall prevention technique by decluttering space, proper use of AD and footwear.  Educated with importance of using hip brace at all times as instructed by MD

## 2022-09-16 ENCOUNTER — HOME CARE VISIT (OUTPATIENT)
Dept: SCHEDULING | Facility: HOME HEALTH | Age: 72
End: 2022-09-16
Payer: MEDICARE

## 2022-09-16 VITALS
DIASTOLIC BLOOD PRESSURE: 62 MMHG | HEART RATE: 104 BPM | TEMPERATURE: 98 F | OXYGEN SATURATION: 96 % | SYSTOLIC BLOOD PRESSURE: 120 MMHG

## 2022-09-16 PROCEDURE — 3331090002 HH PPS REVENUE DEBIT

## 2022-09-16 PROCEDURE — G0157 HHC PT ASSISTANT EA 15: HCPCS

## 2022-09-16 PROCEDURE — 3331090001 HH PPS REVENUE CREDIT

## 2022-09-16 NOTE — HOME HEALTH
Skilled reason for visit: initial SN visit . Education regarding joint surgery  patient did not want SN to look at hip as she had her brace on and did not want to take it off again, she stated that PT had looked at it already     Caregiver involvement:  cares for all needs and is available 24/7. Medications reviewed and all medications are available in the home this visit. The following education was provided regarding medications:  medications reviewed . .    MD notified of any discrepancies/look a-like medications/medication interactions: none this visit   Medications are reconciled  at this time.       Home health supplies by type and quantity ordered/delivered this visit include: none    Patient education provided this visit: SN educated on s/s of infection , when to call MD, pain control , using ice     Sharps education provided: NA    Patient level of understanding of education provided: patient verbalized understanding     Skilled Care Performed this visit: education     Patient response to procedure performed:  no procedure preformed today dressing intact with no drainage       Patient's Progress towards personal goals: patient stated that she would like leg to heal and not have to do this again     Home exercise program: deep breathing exercises     Continued need for the following skills: Nursing and Physical Therapy    Plan for next visit: incision check    Patient and/or caregiver notified and agrees to changes in the Plan of Care YES/NO/NA: N/A      The following discharge planning was discussed with the pt/caregiver: when goals met and education is complete

## 2022-09-17 PROCEDURE — 3331090002 HH PPS REVENUE DEBIT

## 2022-09-17 PROCEDURE — 3331090001 HH PPS REVENUE CREDIT

## 2022-09-18 PROCEDURE — 3331090001 HH PPS REVENUE CREDIT

## 2022-09-18 PROCEDURE — 3331090002 HH PPS REVENUE DEBIT

## 2022-09-19 ENCOUNTER — HOME CARE VISIT (OUTPATIENT)
Dept: SCHEDULING | Facility: HOME HEALTH | Age: 72
End: 2022-09-19
Payer: MEDICARE

## 2022-09-19 PROCEDURE — 3331090002 HH PPS REVENUE DEBIT

## 2022-09-19 PROCEDURE — 3331090001 HH PPS REVENUE CREDIT

## 2022-09-19 PROCEDURE — G0157 HHC PT ASSISTANT EA 15: HCPCS

## 2022-09-19 NOTE — HOME HEALTH
SUBJECTIVE: Patient States I have so much more confidence with with brace, I wish I had had it earlier. CAREGIVER INVOLVEMENT/ASSISTANCE NEEDED FOR: Patient has supportive  to assist with driving and alfie IADLs within the home  . OBJECTIVE:  See interventions. PATIENT EDUCATION PROVIDED THIS VISIT: 1. Walking every hour during waking hours with FWW  2. Supine AP, ankle circles, quad sets, heel slides, SAQ, x15 reps, 3x a day  3. Seated LAQ, heelslides, x15 reps x3 x a day  4. to wear hip brace at all times for hip integrity. PATIENT RESPONSE TO EDUCATION PROVIDED: Patient and  verbalized understanding of education given    PATIENT RESPONSE TO TREATMENT: Patient demonstrates increase walking distance and decrease in caregiver physical assistance with sit to stands. .  ASSESSMENT OF PROGRESS TOWARD GOALS: Patient is progressing towards goals as previously set in plan of care with skilled home health physical therapy services at this time made apparent by improving ambulation and therex completion. Moderate fatigue noted during treatment with seated rest breaks needed throughout. PLAN FOR NEXT VISIT: Patient motivated to increase walking distance, navigate stairs (4 steps to enter) and to increase independence with sit to stands. THE FOLLOWING DISCHARGE PLANNING WAS DISCUSSED WITH THE PATIENT/CAREGIVER: Discussed with patient eventual discharge once patient has met goals and/or maximum benefit from home health skilled physical  therapy services, patient understands and agreeable to goals. At this time needs continued skilled home health physical therapy to address deficits, reduce fall risk and to obtain goals previously established per plan of care. 3x2 visit are left.

## 2022-09-20 ENCOUNTER — HOME CARE VISIT (OUTPATIENT)
Dept: SCHEDULING | Facility: HOME HEALTH | Age: 72
End: 2022-09-20
Payer: MEDICARE

## 2022-09-20 VITALS
SYSTOLIC BLOOD PRESSURE: 108 MMHG | RESPIRATION RATE: 16 BRPM | HEART RATE: 83 BPM | OXYGEN SATURATION: 98 % | DIASTOLIC BLOOD PRESSURE: 70 MMHG | TEMPERATURE: 98 F

## 2022-09-20 PROCEDURE — 3331090001 HH PPS REVENUE CREDIT

## 2022-09-20 PROCEDURE — G0152 HHCP-SERV OF OT,EA 15 MIN: HCPCS

## 2022-09-20 PROCEDURE — 3331090002 HH PPS REVENUE DEBIT

## 2022-09-20 NOTE — Clinical Note
ASSESSMENT:  Pt has had multiple surgeries and dislocations of left hip and is now using a hip brace for stability. Her  has been dressing her LE. Pt was trained in use of AE for donning and doffing pants, socks, and shoes. Pt demonstrated ability to dress independently with AE. Pt was unsafe with shower transfer. She was trained in side step method of shower transfer and performed safely. She is able to sit on a shower chair and wash using a long handle sponge for LE. Pt had independent and safe transfers for bed, chair, and commode. She is able to perform grooming standing in front of her sink. Pt is currently unable to perform any IADLs and does not want to attempt any until she is sure her hip is better. Pt was offered further training in LE dressing with AE, showering, and bathroom transfers. Pt said she understood how to dress, shower and get in the shower and did not think she needed more visits. Pt is discharged. Discharge planning discussed with patient and caregiver. Discharge from OT services. Pt/Caregiver did verbalize understanding.

## 2022-09-20 NOTE — HOME HEALTH
SUBJECTIVE: Patient states that getting out of her chair has become easier that she no longer needs her husbands help. CAREGIVER INVOLVEMENT/ASSISTANCE NEEDED FOR: Patient lives in a one story home with a very supportive  and son at home with her. .  OBJECTIVE:  See interventions. PATIENT EDUCATION PROVIDED THIS VISIT: Verbal education throughout interventions. 1:Patient and  verbalized understanding to keep hip brace on at all times,  2: To continue to walk through out the day with FWW and . 3: To continue to complete HEP twice a day. PATIENT RESPONSE TO EDUCATION PROVIDED: motivated with no additional questions to education provided. See interventions. PATIENT RESPONSE TO TREATMENT:  Vitals stable throughout. No c/o increased pain or discomfort throughout visit. ASSESSMENT OF PROGRESS TOWARD GOALS: Patient is progressing towards goals as evidenced by improving ambulation balance able to navigate uneven surface outside with FWW , First attempt with stair, curb training and car transfers will need further training due to unsafe technique with  involvement for safety. Lester Em PLAN FOR NEXT VISIT: Discussed with patient eventual discharge once patient has met goals and/or maximum benefit from home health skilled physical  therapy services, patient understands and agreeable to goals. At this time needs continued skilled home health physical therapy to address deficits, reduce fall risk and to obtain goals previously established per plan of care. 2x2 visit are left. THE FOLLOWING DISCHARGE PLANNING WAS DISCUSSED WITH THE PATIENT/CAREGIVER: 2w1, 3w1 visits remaining with anticipated DC 9/29 or 9/30.

## 2022-09-21 ENCOUNTER — HOME CARE VISIT (OUTPATIENT)
Dept: HOME HEALTH SERVICES | Facility: HOME HEALTH | Age: 72
End: 2022-09-21
Payer: MEDICARE

## 2022-09-21 ENCOUNTER — HOME CARE VISIT (OUTPATIENT)
Dept: SCHEDULING | Facility: HOME HEALTH | Age: 72
End: 2022-09-21
Payer: MEDICARE

## 2022-09-21 VITALS — RESPIRATION RATE: 16 BRPM | DIASTOLIC BLOOD PRESSURE: 76 MMHG | TEMPERATURE: 97.8 F | SYSTOLIC BLOOD PRESSURE: 122 MMHG

## 2022-09-21 PROCEDURE — 3331090001 HH PPS REVENUE CREDIT

## 2022-09-21 PROCEDURE — 3331090002 HH PPS REVENUE DEBIT

## 2022-09-21 NOTE — HOME HEALTH
Summary of clinical condition:   Pt had a THR in May 2022 that dislocated 3 days after surgery. Since then she has had multiple revisions and dislocations with the last being on 9/11/2022. She did well and was discharged home on 9/13/2022 with orders for [facility] OT. Medical History: OA, THR x 3, Depression, GERD,  Medications review completed. No adverse reactions noted. Added Lexapro and percocet to active list.  Caregiver involvement: Pt's  assists with LE dressing, shower transfers, and all IADLs. Patient education provided this visit:  Pt was educated about safe use of AE for dressing and showering. She demonstrated understanding. Home exercise program:   None needed    ASSESSMENT:  Pt has had multiple surgeries and dislocations of left hip and is now using a hip brace for stability. Her  has been dressing her LE. Pt was trained in use of AE for donning and doffing pants, socks, and shoes. Pt demonstrated ability to dress independently with AE. Pt was unsafe with shower transfer. She was trained in side step method of shower transfer and performed safely. She is able to sit on a shower chair and wash using a long handle sponge for LE. Pt had independent and safe transfers for bed, chair, and commode. She is able to perform grooming standing in front of her sink. Pt is currently unable to perform any IADLs and does not want to attempt any until she is sure her hip is better. Pt was offered further training in LE dressing with AE, showering, and bathroom transfers. Pt said she understood how to dress, shower and get in the shower and did not think she needed more visits. Pt is discharged. Discharge planning discussed with patient and caregiver. Discharge from OT services. Pt/Caregiver did verbalize understanding.

## 2022-09-22 ENCOUNTER — HOME CARE VISIT (OUTPATIENT)
Dept: SCHEDULING | Facility: HOME HEALTH | Age: 72
End: 2022-09-22
Payer: MEDICARE

## 2022-09-22 VITALS
DIASTOLIC BLOOD PRESSURE: 72 MMHG | TEMPERATURE: 98 F | HEART RATE: 77 BPM | OXYGEN SATURATION: 100 % | SYSTOLIC BLOOD PRESSURE: 124 MMHG | RESPIRATION RATE: 17 BRPM

## 2022-09-22 VITALS
OXYGEN SATURATION: 98 % | DIASTOLIC BLOOD PRESSURE: 62 MMHG | SYSTOLIC BLOOD PRESSURE: 118 MMHG | RESPIRATION RATE: 16 BRPM | HEART RATE: 78 BPM

## 2022-09-22 PROCEDURE — 3331090001 HH PPS REVENUE CREDIT

## 2022-09-22 PROCEDURE — G0157 HHC PT ASSISTANT EA 15: HCPCS

## 2022-09-22 PROCEDURE — 3331090002 HH PPS REVENUE DEBIT

## 2022-09-22 PROCEDURE — G0299 HHS/HOSPICE OF RN EA 15 MIN: HCPCS

## 2022-09-22 NOTE — HOME HEALTH
SUBJECTIVE: I feel I am getting stronger and a little more confident about my hip  CAREGIVER INVOLVEMENT/ASSISTANCE NEEDED FOR: Single story home with very supportive  who assist with driving and IADLs. OBJECTIVE:  See interventions. PATIENT EDUCATION PROVIDED THIS VISIT: 1: continue with walking every hour around home with FWW and S  2: continue to complete seated HEP  3: Remain in prescribed medication as written. PATIENT RESPONSE TO EDUCATION PROVIDED: Patient and  verbalized understanding of written and verbal education  PATIENT RESPONSE TO TREATMENT: Patient demonstrated increase confidence with transfers with decrease assistance needed. Demonstrates increase gait speed and johanny with turns. ASSESSMENT OF PROGRESS TOWARD GOALS: Patient is progressing towards goals made apparent by improving gait training and transfers. L LE weakness limits endurance and functional outcomes   . PLAN FOR NEXT VISIT: reattempt stair and car training to increase independence. THE FOLLOWING DISCHARGE PLANNING WAS DISCUSSED WITH THE PATIENT/CAREGIVER: Discussed with patient discharge next week.  next visit 9/26

## 2022-09-23 PROCEDURE — 3331090001 HH PPS REVENUE CREDIT

## 2022-09-23 PROCEDURE — 3331090002 HH PPS REVENUE DEBIT

## 2022-09-23 NOTE — HOME HEALTH
Skilled reason for visit: education regarding medication and disease. Patient did not want nurse to look at incision, she stated that it was fine and she did not need bandage changed she did not want to take her brace off     Caregiver involvement:  helps with all needs . Medications reviewed and all medications are available in the home this visit. The following education was provided regarding medications:  all meds in home and she is taking them as prescribed . MD notified of any discrepancies/look a-like medications/medication interactions: none  Medications are reconciled  at this time.       Home health supplies by type and quantity ordered/delivered this visit include: none    Patient education provided this visit: SN educated on s/s of infection,  eating protein to help with healing, pain control    Sharps education provided: NA    Patient level of understanding of education provided: patient verbalized understanding     Skilled Care Performed this visit: education    Patient response to procedure performed:  NA    Patient's Progress towards personal goals: patient stated that she is doing ok but is scared to take brace off     Home exercise program: deep breathing     Continued need for the following skills: Nursing and Physical Therapy    Plan for next visit: education    Patient and/or caregiver notified and agrees to changes in the Plan of Care YES/NO/NA: N/A      The following discharge planning was discussed with the pt/caregiver: when goals met and education is complete

## 2022-09-24 PROCEDURE — 3331090001 HH PPS REVENUE CREDIT

## 2022-09-24 PROCEDURE — 3331090002 HH PPS REVENUE DEBIT

## 2022-09-25 PROCEDURE — 3331090002 HH PPS REVENUE DEBIT

## 2022-09-25 PROCEDURE — 3331090001 HH PPS REVENUE CREDIT

## 2022-09-26 ENCOUNTER — HOME CARE VISIT (OUTPATIENT)
Dept: SCHEDULING | Facility: HOME HEALTH | Age: 72
End: 2022-09-26
Payer: MEDICARE

## 2022-09-26 PROCEDURE — 3331090002 HH PPS REVENUE DEBIT

## 2022-09-26 PROCEDURE — G0157 HHC PT ASSISTANT EA 15: HCPCS

## 2022-09-26 PROCEDURE — 3331090001 HH PPS REVENUE CREDIT

## 2022-09-27 VITALS
DIASTOLIC BLOOD PRESSURE: 61 MMHG | HEART RATE: 85 BPM | SYSTOLIC BLOOD PRESSURE: 119 MMHG | RESPIRATION RATE: 16 BRPM | TEMPERATURE: 98 F | OXYGEN SATURATION: 98 %

## 2022-09-27 PROCEDURE — 3331090002 HH PPS REVENUE DEBIT

## 2022-09-27 PROCEDURE — 3331090001 HH PPS REVENUE CREDIT

## 2022-09-27 NOTE — HOME HEALTH
SUBJECTIVE: Patient I fell back onto my couch on Saturday  CAREGIVER INVOLVEMENT/ASSISTANCE NEEDED FOR: Supportive  at home to assist with all IADLs and transport  . OBJECTIVE:  See interventions. PATIENT EDUCATION PROVIDED THIS VISIT: 1: walk with FWW 3 x a days within home with some one present  2: complete L LE HEP   3: continue to wear hip brace 24/7    PATIENT RESPONSE TO EDUCATION PROVIDED: Patient verbalized understanding   PATIENT RESPONSE TO TREATMENT: Patient motivated to complete steps to go outside. Also to walk outside on uneven surface. .  ASSESSMENT OF PROGRESS TOWARD GOALS: Continues to progress towards goals by increase speed and gait pattern especially outside on uneven surface with FWW and to complete steps with decrease of assistance needed. Use of R ascending rail increases safety with descent. Hilary Eaton PLAN FOR NEXT VISIT: observe bed mobility   THE FOLLOWING DISCHARGE PLANNING WAS DISCUSSED WITH THE PATIENT/CAREGIVER: Patient is on target to dc end of this week and is aware of dc plan.  final visit 9/28

## 2022-09-28 ENCOUNTER — HOME CARE VISIT (OUTPATIENT)
Dept: SCHEDULING | Facility: HOME HEALTH | Age: 72
End: 2022-09-28
Payer: MEDICARE

## 2022-09-28 VITALS
DIASTOLIC BLOOD PRESSURE: 66 MMHG | SYSTOLIC BLOOD PRESSURE: 129 MMHG | HEART RATE: 78 BPM | RESPIRATION RATE: 17 BRPM | OXYGEN SATURATION: 95 % | TEMPERATURE: 98 F

## 2022-09-28 VITALS — DIASTOLIC BLOOD PRESSURE: 80 MMHG | SYSTOLIC BLOOD PRESSURE: 138 MMHG | HEART RATE: 81 BPM | OXYGEN SATURATION: 98 %

## 2022-09-28 PROCEDURE — 3331090002 HH PPS REVENUE DEBIT

## 2022-09-28 PROCEDURE — G0157 HHC PT ASSISTANT EA 15: HCPCS

## 2022-09-28 PROCEDURE — G0299 HHS/HOSPICE OF RN EA 15 MIN: HCPCS

## 2022-09-28 PROCEDURE — 3331090001 HH PPS REVENUE CREDIT

## 2022-09-28 NOTE — HOME HEALTH
SUBJECTIVE: Patient stated I start out patient PT on Monday  CAREGIVER INVOLVEMENT/ASSISTANCE NEEDED FOR: lives in 1 story home with very supportive  and son  . OBJECTIVE:  See interventions. PATIENT EDUCATION PROVIDED THIS VISIT: 1: keep walking with FWW  2: continue to take medications  3: wear hip brace 24/7  PATIENT RESPONSE TO EDUCATION PROVIDED: Verbalized understanding  PATIENT RESPONSE TO TREATMENT: Motivated to demonstrate transfers and to walk throughout home with FWW  .  ASSESSMENT OF PROGRESS TOWARD GOALS: increase independence with transfers to [facility], increase walking speed and gait pattern. Vincenzo Tipton PLAN FOR NEXT VISIT: next visit is DC  THE FOLLOWING DISCHARGE PLANNING WAS DISCUSSED WITH THE PATIENT/CAREGIVER:Discussed with patient discharge and continuation of PT through outpatient.  That has been set up

## 2022-09-29 PROCEDURE — 3331090002 HH PPS REVENUE DEBIT

## 2022-09-29 PROCEDURE — 3331090001 HH PPS REVENUE CREDIT

## 2022-09-29 NOTE — HOME HEALTH
Skilled reason for visit: discharge from nursing patient stated that had appointment with MD this morning and he changed bandage and she did not want SN to look at it today. Caregiver involvement:  cares for all needs . Medications reviewed and all medications are available in the home this visit. The following education was provided regarding medications:  all medications are in the hoem and patient takes them as prescribed by MD.    MD notified of any discrepancies/look a-like medications/medication interactions: none  Medications are reconciled  at this time.       Home health supplies by type and quantity ordered/delivered this visit include: none    Patient education provided this visit: SN educated to continue with medications as ordered and to continue to wear brace     Sharps education provided: NA    Patient level of understanding of education provided: patient verbalized understanding     Skilled Care Performed this visit: education    Patient response to procedure performed:  none preformed today     Agency Progress toward goals: all goals met at this time     Patient's Progress towards personal goals: patient is eager to get better     Home exercise program: deep breathing techqnues     Continued need for the following skills: Physical Therapy    Plan for next visit: none by nursing     Patient and/or caregiver notified and agrees to changes in the Plan of Care YES/NO/NA: N/A      The following discharge planning was discussed with the pt/caregiver: today from nursing

## 2022-09-30 ENCOUNTER — HOME CARE VISIT (OUTPATIENT)
Dept: SCHEDULING | Facility: HOME HEALTH | Age: 72
End: 2022-09-30
Payer: MEDICARE

## 2022-09-30 PROCEDURE — 3331090001 HH PPS REVENUE CREDIT

## 2022-09-30 PROCEDURE — 3331090002 HH PPS REVENUE DEBIT

## 2022-09-30 PROCEDURE — G0151 HHCP-SERV OF PT,EA 15 MIN: HCPCS

## 2022-10-01 VITALS
OXYGEN SATURATION: 95 % | TEMPERATURE: 98 F | SYSTOLIC BLOOD PRESSURE: 121 MMHG | HEART RATE: 93 BPM | RESPIRATION RATE: 16 BRPM | DIASTOLIC BLOOD PRESSURE: 60 MMHG

## 2022-10-01 PROCEDURE — 3331090001 HH PPS REVENUE CREDIT

## 2022-10-01 PROCEDURE — 3331090002 HH PPS REVENUE DEBIT

## 2022-10-01 NOTE — CASE COMMUNICATION
Cait Vásquez received skilled PT and RN s/p L LAURA revision due to multiple dislocations and is now in a hip precaution brace she is to wear 24/7 to avoid further dislocations. This patient has currently met all goals and has been discharged to a HEP and outpatient physical therapy at this time. She is ambulating >200 ft with a FWW, MI, independent with transfers and a daily HEP and scored 19/28 on the Tinetti this visit. She has not di slocated and is compliant with wearing her brace at all times. RN goals met for wound care, post-op instructional care and medication management. Patient verbalized understanding of all discharge instructions and is in agreement with discharge this visit.

## 2022-10-01 NOTE — HOME HEALTH
S: Patient stated she was doing well and has not dislocated since this last surgery 9/11. She states she is scheduled to begin outpatient PT Monday. O: PAIN: see pain tab   WOUND:see wound addendum   ROM: L hip ROM limited by brace and pt was able to teach back her hip precautions. STRENGTH:Patient demonstrates decreased muscle strength as follows:  R hip flex 5/5  R hip abd 5/5  R hip add 5/5  R hip ext 5/5  R knee flex 5/5  R knee ext 5/5  R ankle DF 5/5  L hip flex nt/5  L hip abd 3/5  L hip ext 3/5  L hip add 4/5  L knee flex 4/5  L knee ext 3/5  L ankle DF 5/5   BED MOBILITY: independent   EQUIPMENT: FWW, hip brace  TRANSFERS: MI  GAIT: MI with FWW and forward flexed posture. STEPS: Supervision with step to gait patter   BALANCE: Tinetti 19/28 and pt has been free from falls. A:ASSESSMENT AND PROGRESS TOWARD GOALS:  Clearance Doreen received skilled PT and RN s/p L LAURA revision due to multiple dislocations and is now in a hip precaution brace she is to wear 24/7 to avoid further dislocations. This patient has currently met all goals and has been discharged to a HEP and outpatient physical therapy at this time. She is ambulating >200 ft with a FWW, MI, independent with transfers and a daily HEP and scored 19/28 on the Tinetti this visit. She has not dislocated and is compliant with wearing her brace at all times. RN goals met for wound care, post-op instructional care and medication management. Patient verbalized understanding of all discharge instructions and is in agreement with discharge this visit. P:DC    1. Discharge medication list reconciled with patient and caregiver. Questions regarding medications answered and patient/caregiver advised to refer to MD for any medication questions after discharge. 2. Patient to continue use of the following assistive device for maximum safety: FWW, hip brace  3.  Today's treatment included: review of therapeutic exercise program, reassessment of mobility, transfers, balance and gait. 4. Patient and caregiver demonstrate understanding of DC instructions and repeat verbalization. Patient and caregiver given written copy of instructions. 5. Patient and caregiver given notification of discharge and in agreement with DC this date. 6. MD notified of discharge.

## 2022-10-02 PROCEDURE — 3331090002 HH PPS REVENUE DEBIT

## 2022-10-02 PROCEDURE — 3331090001 HH PPS REVENUE CREDIT

## 2022-12-07 NOTE — Clinical Note
Patient Class[de-identified] OBSERVATION [104]   Type of Bed: Surgical [18]   Cardiac Monitoring Required?: No   Reason for Observation: Post revision dislocation of the left hip, admitting with plan for surgery   Admitting Diagnosis: Hip dislocation, left, initial encounter [facility]) [[id number]]   Admitting Physician: Low Arndt   Attending Physician: Low Arndt Unknown

## 2024-10-03 NOTE — DISCHARGE INSTRUCTIONS
Total Hip Arthroplasty Discharge Instructions   Blu Bonner M.D. Please take the time to review the following instructions before you leave the hospital and use them as guidelines during your recovery from surgery. If you have any questions you may contact my office at [phone]. Wound Care / Dressing Changes:     Two days after your surgery date you should remove your dressing. A big, bulky dressing isn't necessary as long as there isn't any drainage from the incisions. If there is drainage you can put a band-aid, Primapore, or Mepilex dressing over the incision and change it daily until drainage stops. No dressing is necessary if there is no drainage. It isn't necessary to apply antibiotic ointment to your incisions. Prineo tape will peel off in approximately 2-6 weeks. It does not need to be removed prior to that. When it begins to peel off you can cut the edges away with scissors. Spencer Lolly / Bathing: You may only shower. You may shower if there is no drainage from your incisions. Your dressing may be removed for showering. You may get your incisions wet in the shower. Don't vigorously scrub the area where your incisions are. Apply a clean, dry dressing after drying off the area of your incisions. Don't take a tub bath, get in a swimming pool or Jacuzzi until the incisions are completely healed. Do not soak your incisions under water. Weight Bearing Status / Braces:     Weight bearing is as tolerated. Use crutches, walker, or cane as needed for support. You may move your joints as much as tolerated. Home Health:    Home health has been arranged for skilled nursing visits and physical therapy. Your home health has been set up through____________ . If no one from the agency calls you on the day after you arrive home, please contact them at the number provided at discharge. PT, gait training and strengthening. Continue therapeutic exercises.      Physical Therapy:       Begin In-Home Physical Therapy; 3 times a week to work on gait training, range of motion, strengthening, and weight bearing exercises as tolerable. Continue to use your walker or cane when walking; may progress from the walker to a cane to complete total bearing as tolerable. Ice / Elevation:     Continue ice and elevation as needed for pain and swelling. Diet:     Resume your prehospital diet. If you have excessive nausea or vomiting call your doctor's office     Medication:      1. You will be given a prescription for pain medication when you are discharged for the hospital. Take the medication as needed according to the directions on the prescription bottle. Possible side effects ofthe medication include dizziness, headache, nausea, vomiting, constipation and urinary retention. If you experience any ofthese side effects call the office so that we can assist you in relieving them. Discontinue the use ofthe pain medication if you develop itching, rash, shortness ofbreath or difficulties swallowing. If these symptoms become severe or aren't relieved by discontinuing the medication you should seek immediate medical attention. Refills of pain medication are authorized during office hours only. (8am - 5pm Mon. thru Fri.)     1. You may resume the medication you were taking prior to your surgery. Pain medication may change the effects of any antidepressant medication you are taking. Ifyou have any questions about possible interactions between your regular medications and the pain medication you should consult the physician who prescribes your regular medications. 2. Other medication notes:      Blood Thinner: You will be prescribed Aspirin 81 mg to take by mouth twice daily after meals for one month following surgery to prevent blood clots. Follow Up Appointment:   Please call [phone] for a follow appointment with Dr. Naman Rausch in 10-14 days from the time of your surgery.  Please let our office know you are scheduling a post-op appointment. Signs and Symptoms to be Aware of: If any of the following signs and symptoms occur, you should contact Dr. Sharonda Sandoval office. Please be advised if a problem arises which you feel requires immediate medical attention or you are unable to contact Dr. Sharonda Sandoval office you should seek immediate medical attention at the emergency department or other health care facility you have access to. Signs and symptoms to watch for include:     1. A sudden increase in swelling and l or redness or warmth at the area your surgery was performed which isn't relieved by rest, ice and elevation. 2 Oral temperature greater than 101.5 degrees for 12 hours or more which isn't relieved by an increase in fluid intake and taking two Tylenol every 4-6 hours. 3 Excessive drainage from your incisions, or drainage which hasn't stopped by 72 hours after your surgery despite applying a compressive dressing, ice and elevation. 4 Calfpain, tenderness, redness or swelling which isn't relieved with rest and elevation. 5 Fever, chills, shortness ofbreath, chest pain, nausea, vomiting or other signs and symptoms which are of concern to you. DISCHARGE SUMMARY from Nurse    PATIENT INSTRUCTIONS:    After general anesthesia or intravenous sedation, for 24 hours or while taking prescription Narcotics:  · Limit your activities  · Do not drive and operate hazardous machinery  · Do not make important personal or business decisions  · Do  not drink alcoholic beverages  · If you have not urinated within 8 hours after discharge, please contact your surgeon on call.     Report the following to your surgeon:  · Excessive pain, swelling, redness or odor of or around the surgical area  · Temperature over 101.5 degrees for greater than 12 hours (as stated above in surgical instructions)  · Nausea and vomiting lasting longer than 4 hours or if unable to take medications  · Any signs of decreased circulation or nerve impairment to extremity: change in color, persistent  numbness, tingling, coldness or increase pain  · Any questions    What to do at Home:  Recommended activity: See surgical instructions    If you experience any of the ABOVE symptoms, please follow up with SURGEON. *  Please give a list of your current medications to your Primary Care Provider. *  Please update this list whenever your medications are discontinued, doses are      changed, or new medications (including over-the-counter products) are added. *  Please carry medication information at all times in case of emergency situations. These are general instructions for a healthy lifestyle:    No smoking/ No tobacco products/ Avoid exposure to second hand smoke  Surgeon General's Warning:  Quitting smoking now greatly reduces serious risk to your health. Obesity, smoking, and sedentary lifestyle greatly increases your risk for illness    A healthy diet, regular physical exercise & weight monitoring are important for maintaining a healthy lifestyle    You may be retaining fluid if you have a history of heart failure or if you experience any of the following symptoms:  Weight gain of 3 pounds or more overnight or 5 pounds in a week, increased swelling in our hands or feet or shortness of breath while lying flat in bed. Please call your doctor as soon as you notice any of these symptoms; do not wait until your next office visit. The discharge information has been reviewed with the patient. The patient verbalized understanding. Discharge medications reviewed with the patient and appropriate educational materials and side effects teaching were provided.   ___________________________________________________________________________________________________________________________________            Patient Education        Learning About Indwelling Urinary Catheter Care to Prevent Infection  Overview     A urinary catheter is a flexible plastic tube that's used to drain urine from your bladder when you can't urinate on your own. The catheter allows urine to drain from the bladder into a bag. Two types of drainage bags may be used with a urinary catheter. · A bedside bag is a large bag that you can hang on the side of your bed or on a chair. You can use it overnight or anytime you will be sitting or lying down for a long time. · A leg bag is a small bag that you can use during the day. It is usually attached to your thigh or calf and hidden under your clothes. Having a urinary catheter increases your risk of getting a urinary tract infection. Germs may get on the catheter and cause an infection in your bladder or kidneys. The longer you have a catheter, the more likely it is that you will get an infection. You can help prevent this problem with good hygiene and careful handling of your catheter and drainage bags. How can you help prevent infection? Take care to stay clean  · Always wash your hands well before and after you handle your catheter. · Clean the skin around the catheter daily using soap and water. Dry with a clean towel afterward. You can shower with your catheter and drainage bag in place unless your doctor told you not to. · When you clean around the catheter, check the surrounding skin for signs of infection. Look for things like pus and irritated, swollen, red, or tender skin around the catheter. Be careful with your drainage bag  · Always keep the drainage bag below the level of your bladder. This will help keep urine from flowing back into your bladder. · Check often to see that urine is flowing through the catheter into the drainage bag. · Empty the drainage bag when it is half full. This will keep it from overflowing or backing up. · When you empty the drainage bag, do not let the tubing or drain spout touch anything.   · Keep the cap that comes with the tubing, and cover the tip of the tubing when not in use.  Be careful with your catheter  · Do not unhook the catheter from the drain tube until you are ready to change the tubing and bag. That could let germs get into the tube. · Make sure that the catheter tubing does not get twisted or kinked. · Do not tug or pull on the catheter. And make sure that the drainage bag does not drag or pull on the catheter. · Do not put powder or lotion on the skin around the catheter. · Talk with your doctor about your options for sexual intercourse while wearing a catheter. How do you empty the bag? If your doctor has asked you to keep a record, write down the amount of urine in the bag before you empty it. Wash your hands before and after you touch the bag. 1. Remove the drain spout from its sleeve at the bottom of the drainage bag.  2. Open the valve on the drain spout. Let the urine flow out into the toilet or a container. Be careful not to let the tubing or drain spout touch anything. 3. After you empty the bag, close the valve. Then put the drain spout back into its sleeve at the bottom of the collection bag. How do you switch to a bedside bag for overnight use? Wash your hands before and after you handle the bags. 1. Empty the leg bag that is attached to the tubing and catheter. 2. Put a clean towel under the tubing attached to the leg bag.  3. Use an alcohol wipe to clean the tip of the tubing attached to the bedside bag.  4. To stop the flow of urine, pinch the catheter with your fingers just above the tubing connection. 5. Use a twisting motion to disconnect the leg bag tubing from the catheter. 6. Then securely connect the catheter to the tubing from the bedside bag. How do you clean a bedside bag? Many people clean their bedside bag in the morning if they switch to a leg bag. To clean a bedside drainage ba. Remove the bedside bag and attach the leg bag.  2. Fill the bedside bag with 2 parts vinegar and 3 parts water.  Let it stand for 20 minutes. 3. Empty the bag, and let it air dry. When should you call for help? Call your doctor now or seek immediate medical care if:    · You have symptoms of a urinary infection. These may include:  ? Pain or burning when you urinate. ? A frequent need to urinate without being able to pass much urine. ? Pain in the flank, which is just below the rib cage and above the waist on either side of the back. ? Blood in your urine. ? A fever.     · Your urine smells bad.     · You see large blood clots in your urine.     · No urine or very little urine is flowing into the bag for 4 or more hours. Watch closely for changes in your health, and be sure to contact your doctor if:    · The area around the catheter becomes irritated, swollen, red, or tender, or there is pus draining from it.     · Urine is leaking from the place where the catheter enters your body. Follow-up care is a key part of your treatment and safety. Be sure to make and go to all appointments, and call your doctor if you are having problems. It's also a good idea to know your test results and keep a list of the medicines you take. Where can you learn more? Go to http://www.gray.com/  Enter U010 in the search box to learn more about \"Learning About Indwelling Urinary Catheter Care to Prevent Infection. \"  Current as of: October 18, 2021               Content Version: 13.2  © 4191-1072 [employer]. Care instructions adapted under license by [employer] (which disclaims liability or warranty for this information). If you have questions about a medical condition or this instruction, always ask your healthcare professional. [place] [address] any warranty or liability for your use of this information. 03-Oct-1985

## 2024-10-30 NOTE — NURSE NAVIGATOR
Chastity Borrero rounded on post anterior hip replacement. Discussed the following during rounding: Activity:  OOB for all meals. Promoting Circulation  Ankle pumps 10 times an hour at hospital & home. Make sure to walk short distances every hour while awake to help with muscle tightness, aching and soreness. Follow the exercises and mobility instructions provided by Physical Therapy. Change positions every hour to help ease stiffness and soreness. Pain Control:  Pain medications side effects discussed. Use ice, distraction, moving, & change position to help with pain. Rest between activity. Reminded narcotics and anesthesia cause constipation so need to take stool softener/mild laxative daily while on narcotics. Reviewed how to safely elevate the leg after exercises for 30 minutes and before bed to decrease swelling. Incentive Spirometry:    Use of incentive spirometer 10 x/hr  Diet:   Eat for healing. Drink enough fluids so urine is lemonade in color. .   Reminded medication can cause nausea if taken on an empty stomach so need to eat before taking them. Patient Safety:   Call light & belongings in reach. Call for help when want to walk or get OOB. Knee high DIAMOND hose applied bilaterally.      Mobility Intervention:       [] Pt dangled at edge of bed    [] Pt assisted OOB to bedside commode    [x] Pt assisted OOB to chair    [] Pt ambulated to bathroom    [] Patient was ambulated in room/hallway    Assistive Device Utilized:       [x] Rolling walker   [] Crutches   [] Back Brace   [] Knee immobilizer   [] Neck Collar    After Rounding and Checking on Patient     [x] Patient left in no apparent distress sitting up in chair  [] Patient left in no apparent distress in bed  [x] Call bell left within reach  [] SCDs on both legs & machine turned on  [] Knee immobilizer on  [x] Ice applied  [x] RN notified  []  present  [] Bed alarm activated    Reason patient not mobilized:      [] Patient refused   [] Nausea/vomiting   [] Low blood pressure   [] Drowsy/lethargic    Pain Rating:     [] 1  [] 6  [] 2  [] 7  [] 3  [] 8  [] 4  [] 9  [] 5  [] 10    Left patient with call light, cell phone and personal belongings in reach for safety.       Nurse Navigator 18-Oct-2024 14:48

## 2025-04-22 NOTE — ROUTINE PROCESS
Bedside and Verbal shift change report given to Donald Montes RN by Jorge Witt. Report included the following information SBAR, Kardex, OR Summary, Intake/Output and MAR. No

## (undated) DEVICE — SOLUTION IRRIG 3000ML LAC R FLX CONT

## (undated) DEVICE — OSCILLATING TIP SAW CARTRIDGE: Brand: PRECISION FALCON

## (undated) DEVICE — SUT VCRL + 1 36IN CT1 VIO --

## (undated) DEVICE — SOLUTION IV 250ML 0.9% SOD CHL CLR INJ FLX BG CONT PRT CLSR

## (undated) DEVICE — NDL SPNE QNCKE 18GX3.5IN LF --

## (undated) DEVICE — GARMENT,MEDLINE,DVT,INT,CALF,MED, GEN2: Brand: MEDLINE

## (undated) DEVICE — HANDPIECE SET WITH HIGH FLOW TIP AND SUCTION TUBE: Brand: INTERPULSE

## (undated) DEVICE — COAXIAL FEMORAL CANAL TIP

## (undated) DEVICE — SOL IRRIGATION INJ NACL 0.9% 500ML BTL

## (undated) DEVICE — IMPERVIOUS SURGICAL GOWN PACK, XXL POLYREINFORCED: Brand: CONVERTORS

## (undated) DEVICE — SYR LR LCK 1ML GRAD NSAF 30ML --

## (undated) DEVICE — GOWN,SIRUS,NONRNF,SETINSLV,2XL,18/CS: Brand: MEDLINE

## (undated) DEVICE — SYSTEM SKIN CLSR 22CM DERMBND PRINEO

## (undated) DEVICE — THE CANADY HYBRID PLASMA SCALPEL IS AN ELECTROSURGICAL PLASMA SCALPEL THAT USES AN 85MM NON-BENDABLE TIP. THE ELECTROSURGICAL PLASMA SCALPEL IS USED TO SIMULTANEOUSLY CUT AND COAGULATE BIOLOGICAL TISSUE.: Brand: CANADY HYBRID PLASMA SCALPEL

## (undated) DEVICE — Device

## (undated) DEVICE — GLOVE SURG SZ 8 L12IN FNGR THK79MIL GRN LTX FREE

## (undated) DEVICE — 3M™ IOBAN™ 2 ANTIMICROBIAL INCISE DRAPE 6650EZ: Brand: IOBAN™ 2

## (undated) DEVICE — WEREWOLF FASTSEAL 6.0 HEMOSTASIS WAND: Brand: FASTSEAL 6.0 HEMOSTASIS WAND

## (undated) DEVICE — SOLUTION IRRIG 1500ML STRL H2O USP POUR PLAS BTL

## (undated) DEVICE — SUT MONOCRYL PLUS UD 3-0 --

## (undated) DEVICE — SUTURE MCRYL SZ 2-0 L36IN ABSRB UD L36MM CT-1 1/2 CIR Y945H

## (undated) DEVICE — SOL IRR STRL H2O 1500ML BTL --

## (undated) DEVICE — NDL PRT INJ NSAF BLNT 18GX1.5 --

## (undated) DEVICE — OPTIFOAM GENTLE SA, POSTOP, 4X8: Brand: MEDLINE

## (undated) DEVICE — COVER LT HNDL PLAS RIG 2 PER PK

## (undated) DEVICE — PILLOW ABDUCTN DSPB 6X18X25IN -- LG

## (undated) DEVICE — WATERPROOF, BACTERIA PROOF DRESSING WITH ABSORBENT SEE THROUGH PAD: Brand: OPSITE POST-OP VISIBLE 20X10CM CTN 20

## (undated) DEVICE — PACK PROCEDURE SURG ANTR HIP

## (undated) DEVICE — SUT VCRL + 2-0 27IN CT2 UD -- 36/BX

## (undated) DEVICE — SHEET,DRAPE,40X58,STERILE: Brand: MEDLINE

## (undated) DEVICE — HOOD, PEEL-AWAY: Brand: FLYTE

## (undated) DEVICE — GLOVE ORANGE PI 8 1/2   MSG9085

## (undated) DEVICE — GLOVE SURG SZ 85 L12IN FNGR THK79MIL GRN LTX FREE

## (undated) DEVICE — GLOVE ORANGE PI 7   MSG9070

## (undated) DEVICE — NEEDLE HYPO 18GA L1.5IN PNK POLYPR HUB S STL THN WALL FILL

## (undated) DEVICE — GLOVE SURG SZ 7 L12IN FNGR THK79MIL GRN LTX FREE

## (undated) DEVICE — INTENDED FOR TISSUE SEPARATION, AND OTHER PROCEDURES THAT REQUIRE A SHARP SURGICAL BLADE TO PUNCTURE OR CUT.: Brand: BARD-PARKER ® CARBON RIB-BACK BLADES

## (undated) DEVICE — DRAPE XR C ARM 41X74IN LF --

## (undated) DEVICE — SYR 10ML LUER LOK 1/5ML GRAD --

## (undated) DEVICE — LEGGINGS, PAIR, 29X43, STERILE: Brand: MEDLINE

## (undated) DEVICE — Z DISCONTINUED USE 2272124 DRAPE SURG XL N INVASIVE 2 LAYR DISP

## (undated) DEVICE — GOWN,SLEEVE,STERILE,W/CSR WRAP,1/P: Brand: MEDLINE

## (undated) DEVICE — SUT MCRYL + 3-0 27IN PS1 UD --

## (undated) DEVICE — SOL INJ SOD CL 0.9% 100ML BG --

## (undated) DEVICE — GLOVE ORANGE PI 7 1/2   MSG9075

## (undated) DEVICE — SOLUTION IRRIG 3000ML 0.9% SOD CHL FLX CONT 0797208] ICU MEDICAL INC]

## (undated) DEVICE — TOTAL TRAY, DB, 100% SILI FOLEY, 16FR 10: Brand: MEDLINE

## (undated) DEVICE — Z DISCONTINUED USE 2635508 SOL IRRIGATION INJ NACL 0.9% 500ML BTL